# Patient Record
Sex: MALE | Race: BLACK OR AFRICAN AMERICAN | Employment: OTHER | URBAN - NONMETROPOLITAN AREA
[De-identification: names, ages, dates, MRNs, and addresses within clinical notes are randomized per-mention and may not be internally consistent; named-entity substitution may affect disease eponyms.]

---

## 2017-01-01 ENCOUNTER — TELEPHONE (OUTPATIENT)
Dept: UROLOGY | Age: 67
End: 2017-01-01

## 2017-01-01 ENCOUNTER — NURSE ONLY (OUTPATIENT)
Dept: UROLOGY | Age: 67
End: 2017-01-01
Payer: MEDICARE

## 2017-01-01 DIAGNOSIS — R53.83 FATIGUE, UNSPECIFIED TYPE: ICD-10-CM

## 2017-01-01 DIAGNOSIS — C79.52 SECONDARY MALIGNANT NEOPLASM OF BONE AND BONE MARROW (HCC): ICD-10-CM

## 2017-01-01 DIAGNOSIS — C61 PROSTATE CANCER (HCC): Primary | ICD-10-CM

## 2017-01-01 DIAGNOSIS — M85.80 OSTEOPENIA DUE TO CANCER THERAPY: ICD-10-CM

## 2017-01-01 DIAGNOSIS — C79.51 SECONDARY MALIGNANT NEOPLASM OF BONE AND BONE MARROW (HCC): ICD-10-CM

## 2017-01-01 PROCEDURE — 96402 CHEMO HORMON ANTINEOPL SQ/IM: CPT | Performed by: NURSE PRACTITIONER

## 2017-01-01 PROCEDURE — 99999 PR OFFICE/OUTPT VISIT,PROCEDURE ONLY: CPT | Performed by: NURSE PRACTITIONER

## 2017-01-01 PROCEDURE — 96401 CHEMO ANTI-NEOPL SQ/IM: CPT | Performed by: NURSE PRACTITIONER

## 2017-10-26 DIAGNOSIS — N18.2 CKD (CHRONIC KIDNEY DISEASE), STAGE II: Primary | ICD-10-CM

## 2017-11-10 ENCOUNTER — APPOINTMENT (OUTPATIENT)
Dept: CT IMAGING | Age: 67
End: 2017-11-10
Payer: MEDICARE

## 2017-11-10 ENCOUNTER — HOSPITAL ENCOUNTER (EMERGENCY)
Age: 67
Discharge: LEFT BEFORE DISCHARGE | End: 2017-11-10
Attending: EMERGENCY MEDICINE
Payer: MEDICARE

## 2017-11-10 ENCOUNTER — APPOINTMENT (OUTPATIENT)
Dept: GENERAL RADIOLOGY | Age: 67
End: 2017-11-10
Payer: MEDICARE

## 2017-11-10 VITALS
SYSTOLIC BLOOD PRESSURE: 95 MMHG | DIASTOLIC BLOOD PRESSURE: 67 MMHG | RESPIRATION RATE: 18 BRPM | BODY MASS INDEX: 23.03 KG/M2 | OXYGEN SATURATION: 100 % | TEMPERATURE: 98.4 F | WEIGHT: 170 LBS | HEIGHT: 72 IN

## 2017-11-10 DIAGNOSIS — K59.00 CONSTIPATION, UNSPECIFIED CONSTIPATION TYPE: ICD-10-CM

## 2017-11-10 DIAGNOSIS — C80.1 CANCER (HCC): ICD-10-CM

## 2017-11-10 DIAGNOSIS — E87.6 HYPOKALEMIA: Primary | ICD-10-CM

## 2017-11-10 LAB
ALBUMIN SERPL-MCNC: 3.6 G/DL (ref 3.5–5.1)
ALP BLD-CCNC: 993 U/L (ref 38–126)
ALT SERPL-CCNC: < 5 U/L (ref 11–66)
ANION GAP SERPL CALCULATED.3IONS-SCNC: 13 MEQ/L (ref 8–16)
AST SERPL-CCNC: 17 U/L (ref 5–40)
BILIRUB SERPL-MCNC: 0.4 MG/DL (ref 0.3–1.2)
BUN BLDV-MCNC: 16 MG/DL (ref 7–22)
CALCIUM SERPL-MCNC: 8.6 MG/DL (ref 8.5–10.5)
CHLORIDE BLD-SCNC: 93 MEQ/L (ref 98–111)
CO2: 29 MEQ/L (ref 23–33)
CREAT SERPL-MCNC: 0.6 MG/DL (ref 0.4–1.2)
DIFFERENTIAL, MANUAL: NORMAL
EKG ATRIAL RATE: 66 BPM
EKG P AXIS: 41 DEGREES
EKG P-R INTERVAL: 176 MS
EKG Q-T INTERVAL: 444 MS
EKG QRS DURATION: 86 MS
EKG QTC CALCULATION (BAZETT): 465 MS
EKG R AXIS: 57 DEGREES
EKG T AXIS: 61 DEGREES
EKG VENTRICULAR RATE: 66 BPM
GFR SERPL CREATININE-BSD FRML MDRD: > 90 ML/MIN/1.73M2
GLUCOSE BLD-MCNC: 144 MG/DL (ref 70–108)
MAGNESIUM: 2.3 MG/DL (ref 1.6–2.4)
OSMOLALITY CALCULATION: 273.8 MOSMOL/KG (ref 275–300)
POTASSIUM SERPL-SCNC: 2.9 MEQ/L (ref 3.5–5.2)
SODIUM BLD-SCNC: 135 MEQ/L (ref 135–145)
TOTAL PROTEIN: 6.9 G/DL (ref 6.1–8)

## 2017-11-10 PROCEDURE — 93005 ELECTROCARDIOGRAM TRACING: CPT

## 2017-11-10 PROCEDURE — 99284 EMERGENCY DEPT VISIT MOD MDM: CPT

## 2017-11-10 PROCEDURE — 74176 CT ABD & PELVIS W/O CONTRAST: CPT

## 2017-11-10 PROCEDURE — 83735 ASSAY OF MAGNESIUM: CPT

## 2017-11-10 PROCEDURE — 71010 XR CHEST PORTABLE: CPT

## 2017-11-10 PROCEDURE — 85025 COMPLETE CBC W/AUTO DIFF WBC: CPT

## 2017-11-10 PROCEDURE — 6370000000 HC RX 637 (ALT 250 FOR IP): Performed by: EMERGENCY MEDICINE

## 2017-11-10 PROCEDURE — 36415 COLL VENOUS BLD VENIPUNCTURE: CPT

## 2017-11-10 PROCEDURE — 80053 COMPREHEN METABOLIC PANEL: CPT

## 2017-11-10 RX ORDER — POTASSIUM CHLORIDE 20 MEQ/1
20 TABLET, EXTENDED RELEASE ORAL 2 TIMES DAILY WITH MEALS
Status: DISCONTINUED | OUTPATIENT
Start: 2017-11-10 | End: 2017-11-10 | Stop reason: HOSPADM

## 2017-11-10 RX ORDER — POTASSIUM CHLORIDE 7.45 MG/ML
10 INJECTION INTRAVENOUS ONCE
Status: DISCONTINUED | OUTPATIENT
Start: 2017-11-10 | End: 2017-11-10

## 2017-11-10 RX ORDER — 0.9 % SODIUM CHLORIDE 0.9 %
1000 INTRAVENOUS SOLUTION INTRAVENOUS ONCE
Status: DISCONTINUED | OUTPATIENT
Start: 2017-11-10 | End: 2017-11-10

## 2017-11-10 RX ADMIN — CITROMA MAGNESIUM CITRATE 296 ML: 1.75 LIQUID ORAL at 19:43

## 2017-11-10 RX ADMIN — POTASSIUM CHLORIDE 20 MEQ: 20 TABLET, EXTENDED RELEASE ORAL at 18:57

## 2017-11-10 NOTE — ED NOTES
Patient presents to ED with complaints of constipation and weight loss.       Eloy Al LPN  54/03/87 [id number]

## 2017-11-11 ENCOUNTER — HOSPITAL ENCOUNTER (INPATIENT)
Age: 67
LOS: 5 days | Discharge: HOME | DRG: 722 | End: 2017-11-16
Attending: INTERNAL MEDICINE | Admitting: INTERNAL MEDICINE
Payer: MEDICARE

## 2017-11-11 DIAGNOSIS — C61 CA PROSTATE, ADENOCA (HCC): ICD-10-CM

## 2017-11-11 DIAGNOSIS — E87.6 HYPOKALEMIA: ICD-10-CM

## 2017-11-11 DIAGNOSIS — R97.20 ELEVATED PSA: ICD-10-CM

## 2017-11-11 DIAGNOSIS — D64.9 ANEMIA, UNSPECIFIED TYPE: Primary | ICD-10-CM

## 2017-11-11 DIAGNOSIS — R74.8 ELEVATED ALKALINE PHOSPHATASE LEVEL: ICD-10-CM

## 2017-11-11 PROBLEM — N32.9 LESION OF BLADDER: Status: ACTIVE | Noted: 2017-11-11

## 2017-11-11 PROBLEM — R59.9 ADENOPATHY: Status: ACTIVE | Noted: 2017-11-11

## 2017-11-11 PROBLEM — R63.4 WEIGHT LOSS: Status: ACTIVE | Noted: 2017-11-11

## 2017-11-11 LAB
ABO: NORMAL
ALBUMIN SERPL-MCNC: 3.7 G/DL (ref 3.5–5.1)
ALP BLD-CCNC: 1032 U/L (ref 38–126)
ALT SERPL-CCNC: < 5 U/L (ref 11–66)
ANION GAP SERPL CALCULATED.3IONS-SCNC: 15 MEQ/L (ref 8–16)
ANISOCYTOSIS: ABNORMAL
ANISOCYTOSIS: ABNORMAL
ANTIBODY SCREEN: NORMAL
APTT: 23.7 SECONDS (ref 22–38)
AST SERPL-CCNC: 22 U/L (ref 5–40)
BANDED NEUTROPHILS ABSOLUTE COUNT: 0.3 THOU/MM3
BANDED NEUTROPHILS ABSOLUTE COUNT: 0.3 THOU/MM3
BANDS PRESENT: 5 %
BANDS PRESENT: 7 %
BASOPHILIA: SLIGHT
BASOPHILIA: SLIGHT
BASOPHILS # BLD: 0 %
BASOPHILS # BLD: 0 %
BASOPHILS ABSOLUTE: 0 THOU/MM3 (ref 0–0.1)
BASOPHILS ABSOLUTE: 0 THOU/MM3 (ref 0–0.1)
BILIRUB SERPL-MCNC: 0.4 MG/DL (ref 0.3–1.2)
BILIRUBIN DIRECT: < 0.2 MG/DL (ref 0–0.3)
BUN BLDV-MCNC: 18 MG/DL (ref 7–22)
CALCIUM SERPL-MCNC: 8.6 MG/DL (ref 8.5–10.5)
CHLORIDE BLD-SCNC: 91 MEQ/L (ref 98–111)
CO2: 29 MEQ/L (ref 23–33)
CREAT SERPL-MCNC: 0.7 MG/DL (ref 0.4–1.2)
CRENATED RBC'S: ABNORMAL
DIFFERENTIAL, MANUAL: NORMAL
EOSINOPHIL # BLD: 0 %
EOSINOPHIL # BLD: 0 %
EOSINOPHILS ABSOLUTE: 0 THOU/MM3 (ref 0–0.4)
EOSINOPHILS ABSOLUTE: 0 THOU/MM3 (ref 0–0.4)
ETHYL ALCOHOL, SERUM: < 0.01 %
FERRITIN: 2895 NG/ML (ref 22–322)
GFR SERPL CREATININE-BSD FRML MDRD: > 90 ML/MIN/1.73M2
GLUCOSE BLD-MCNC: 116 MG/DL (ref 70–108)
HCT VFR BLD CALC: 20.4 % (ref 42–52)
HCT VFR BLD CALC: 21.8 % (ref 42–52)
HEMOGLOBIN: 6.5 GM/DL (ref 14–18)
HEMOGLOBIN: 6.8 GM/DL (ref 14–18)
HYPOCHROMIA: ABNORMAL
HYPOCHROMIA: ABNORMAL
INR BLD: 1.22 (ref 0.85–1.13)
IRON: 99 UG/DL (ref 65–195)
LD: 335 U/L (ref 100–190)
LYMPHOCYTES # BLD: 28 %
LYMPHOCYTES # BLD: 29 %
LYMPHOCYTES ABSOLUTE: 1.2 THOU/MM3 (ref 1–4.8)
LYMPHOCYTES ABSOLUTE: 1.5 THOU/MM3 (ref 1–4.8)
MCH RBC QN AUTO: 26.9 PG (ref 27–31)
MCH RBC QN AUTO: 27.5 PG (ref 27–31)
MCHC RBC AUTO-ENTMCNC: 31.2 GM/DL (ref 33–37)
MCHC RBC AUTO-ENTMCNC: 31.7 GM/DL (ref 33–37)
MCV RBC AUTO: 86.2 FL (ref 80–94)
MCV RBC AUTO: 86.5 FL (ref 80–94)
METAMYELOCYTES: 2 %
METAMYELOCYTES: 2 %
MONOCYTES # BLD: 2 %
MONOCYTES # BLD: 3 %
MONOCYTES ABSOLUTE: 0.1 THOU/MM3 (ref 0.4–1.3)
MONOCYTES ABSOLUTE: 0.1 THOU/MM3 (ref 0.4–1.3)
MYELOCYTES: 1 %
NUCLEATED RED BLOOD CELLS: 10 /100 WBC
NUCLEATED RED BLOOD CELLS: 10 /100 WBC
OSMOLALITY CALCULATION: 273 MOSMOL/KG (ref 275–300)
OVALOCYTES: ABNORMAL
PATHOLOGIST REVIEW: ABNORMAL
PDW BLD-RTO: 21.6 % (ref 11.5–14.5)
PDW BLD-RTO: 22.5 % (ref 11.5–14.5)
PLATELET # BLD: 580 THOU/MM3 (ref 130–400)
PLATELET # BLD: 655 THOU/MM3 (ref 130–400)
PLATELET ESTIMATE: ABNORMAL
PLATELET ESTIMATE: ABNORMAL
PMV BLD AUTO: 6.1 MCM (ref 7.4–10.4)
PMV BLD AUTO: 6.4 MCM (ref 7.4–10.4)
POTASSIUM SERPL-SCNC: 3.2 MEQ/L (ref 3.5–5.2)
RBC # BLD: 2.36 MILL/MM3 (ref 4.7–6.1)
RBC # BLD: 2.53 MILL/MM3 (ref 4.7–6.1)
RETICULOCYTE ABSOLUTE COUNT: 2 % (ref 0.5–2)
RH FACTOR: NORMAL
SCHISTOCYTES: ABNORMAL
SEG NEUTROPHILS: 59 %
SEG NEUTROPHILS: 62 %
SEGMENTED NEUTROPHILS ABSOLUTE COUNT: 2.4 THOU/MM3 (ref 1.8–7.7)
SEGMENTED NEUTROPHILS ABSOLUTE COUNT: 3.4 THOU/MM3 (ref 1.8–7.7)
SODIUM BLD-SCNC: 135 MEQ/L (ref 135–145)
SPHEROCYTES: ABNORMAL
TARGET CELLS: ABNORMAL
TARGET CELLS: ABNORMAL
TOTAL IRON BINDING CAPACITY: 179 UG/DL (ref 171–450)
TOTAL PROTEIN: 7.2 G/DL (ref 6.1–8)
TSH SERPL DL<=0.05 MIU/L-ACNC: 36.78 UIU/ML (ref 0.4–4.2)
WBC # BLD: 4 THOU/MM3 (ref 4.8–10.8)
WBC # BLD: 5.5 THOU/MM3 (ref 4.8–10.8)

## 2017-11-11 PROCEDURE — A6210 FOAM DRG >16<=48 SQ IN W/O B: HCPCS

## 2017-11-11 PROCEDURE — 86923 COMPATIBILITY TEST ELECTRIC: CPT

## 2017-11-11 PROCEDURE — 6370000000 HC RX 637 (ALT 250 FOR IP): Performed by: NURSE PRACTITIONER

## 2017-11-11 PROCEDURE — 99284 EMERGENCY DEPT VISIT MOD MDM: CPT

## 2017-11-11 PROCEDURE — 2580000003 HC RX 258: Performed by: NURSE PRACTITIONER

## 2017-11-11 PROCEDURE — 86301 IMMUNOASSAY TUMOR CA 19-9: CPT

## 2017-11-11 PROCEDURE — G0480 DRUG TEST DEF 1-7 CLASSES: HCPCS

## 2017-11-11 PROCEDURE — 86901 BLOOD TYPING SEROLOGIC RH(D): CPT

## 2017-11-11 PROCEDURE — 83550 IRON BINDING TEST: CPT

## 2017-11-11 PROCEDURE — 86304 IMMUNOASSAY TUMOR CA 125: CPT

## 2017-11-11 PROCEDURE — 84443 ASSAY THYROID STIM HORMONE: CPT

## 2017-11-11 PROCEDURE — 99222 1ST HOSP IP/OBS MODERATE 55: CPT | Performed by: INTERNAL MEDICINE

## 2017-11-11 PROCEDURE — 82248 BILIRUBIN DIRECT: CPT

## 2017-11-11 PROCEDURE — 83540 ASSAY OF IRON: CPT

## 2017-11-11 PROCEDURE — 82728 ASSAY OF FERRITIN: CPT

## 2017-11-11 PROCEDURE — P9016 RBC LEUKOCYTES REDUCED: HCPCS

## 2017-11-11 PROCEDURE — 2580000003 HC RX 258: Performed by: INTERNAL MEDICINE

## 2017-11-11 PROCEDURE — 83615 LACTATE (LD) (LDH) ENZYME: CPT

## 2017-11-11 PROCEDURE — 85730 THROMBOPLASTIN TIME PARTIAL: CPT

## 2017-11-11 PROCEDURE — 85610 PROTHROMBIN TIME: CPT

## 2017-11-11 PROCEDURE — 85045 AUTOMATED RETICULOCYTE COUNT: CPT

## 2017-11-11 PROCEDURE — 86900 BLOOD TYPING SEROLOGIC ABO: CPT

## 2017-11-11 PROCEDURE — 86850 RBC ANTIBODY SCREEN: CPT

## 2017-11-11 PROCEDURE — 85025 COMPLETE CBC W/AUTO DIFF WBC: CPT

## 2017-11-11 PROCEDURE — 36415 COLL VENOUS BLD VENIPUNCTURE: CPT

## 2017-11-11 PROCEDURE — 80053 COMPREHEN METABOLIC PANEL: CPT

## 2017-11-11 PROCEDURE — 1200000000 HC SEMI PRIVATE

## 2017-11-11 PROCEDURE — 36430 TRANSFUSION BLD/BLD COMPNT: CPT

## 2017-11-11 RX ORDER — PRAVASTATIN SODIUM 40 MG
40 TABLET ORAL NIGHTLY
Status: DISCONTINUED | OUTPATIENT
Start: 2017-11-11 | End: 2017-11-16 | Stop reason: HOSPADM

## 2017-11-11 RX ORDER — POTASSIUM CHLORIDE 20 MEQ/1
40 TABLET, EXTENDED RELEASE ORAL ONCE
Status: COMPLETED | OUTPATIENT
Start: 2017-11-11 | End: 2017-11-11

## 2017-11-11 RX ORDER — SODIUM CHLORIDE 9 MG/ML
INJECTION, SOLUTION INTRAVENOUS CONTINUOUS
Status: ACTIVE | OUTPATIENT
Start: 2017-11-11 | End: 2017-11-12

## 2017-11-11 RX ORDER — ASPIRIN 325 MG
325 TABLET ORAL
Status: DISCONTINUED | OUTPATIENT
Start: 2017-11-12 | End: 2017-11-16 | Stop reason: HOSPADM

## 2017-11-11 RX ORDER — ONDANSETRON 2 MG/ML
4 INJECTION INTRAMUSCULAR; INTRAVENOUS EVERY 6 HOURS PRN
Status: DISCONTINUED | OUTPATIENT
Start: 2017-11-11 | End: 2017-11-16 | Stop reason: HOSPADM

## 2017-11-11 RX ORDER — POTASSIUM CHLORIDE 20MEQ/15ML
40 LIQUID (ML) ORAL PRN
Status: DISCONTINUED | OUTPATIENT
Start: 2017-11-11 | End: 2017-11-16 | Stop reason: HOSPADM

## 2017-11-11 RX ORDER — POTASSIUM CHLORIDE 20 MEQ/1
40 TABLET, EXTENDED RELEASE ORAL PRN
Status: DISCONTINUED | OUTPATIENT
Start: 2017-11-11 | End: 2017-11-16 | Stop reason: HOSPADM

## 2017-11-11 RX ORDER — SODIUM CHLORIDE 0.9 % (FLUSH) 0.9 %
10 SYRINGE (ML) INJECTION PRN
Status: DISCONTINUED | OUTPATIENT
Start: 2017-11-11 | End: 2017-11-16 | Stop reason: HOSPADM

## 2017-11-11 RX ORDER — ATENOLOL 25 MG/1
25 TABLET ORAL DAILY
Status: DISCONTINUED | OUTPATIENT
Start: 2017-11-12 | End: 2017-11-15

## 2017-11-11 RX ORDER — 0.9 % SODIUM CHLORIDE 0.9 %
250 INTRAVENOUS SOLUTION INTRAVENOUS ONCE
Status: COMPLETED | OUTPATIENT
Start: 2017-11-11 | End: 2017-11-12

## 2017-11-11 RX ORDER — SODIUM CHLORIDE 0.9 % (FLUSH) 0.9 %
10 SYRINGE (ML) INJECTION EVERY 12 HOURS SCHEDULED
Status: DISCONTINUED | OUTPATIENT
Start: 2017-11-11 | End: 2017-11-16 | Stop reason: HOSPADM

## 2017-11-11 RX ORDER — ACETAMINOPHEN 325 MG/1
650 TABLET ORAL EVERY 4 HOURS PRN
Status: DISCONTINUED | OUTPATIENT
Start: 2017-11-11 | End: 2017-11-16 | Stop reason: HOSPADM

## 2017-11-11 RX ORDER — M-VIT,TX,IRON,MINS/CALC/FOLIC 27MG-0.4MG
1 TABLET ORAL DAILY
Status: DISCONTINUED | OUTPATIENT
Start: 2017-11-11 | End: 2017-11-16 | Stop reason: HOSPADM

## 2017-11-11 RX ORDER — POTASSIUM CHLORIDE 7.45 MG/ML
10 INJECTION INTRAVENOUS PRN
Status: DISCONTINUED | OUTPATIENT
Start: 2017-11-11 | End: 2017-11-16 | Stop reason: HOSPADM

## 2017-11-11 RX ORDER — LEVOTHYROXINE SODIUM 0.15 MG/1
150 TABLET ORAL
Status: DISCONTINUED | OUTPATIENT
Start: 2017-11-12 | End: 2017-11-16 | Stop reason: HOSPADM

## 2017-11-11 RX ADMIN — SODIUM CHLORIDE: 9 INJECTION, SOLUTION INTRAVENOUS at 21:09

## 2017-11-11 RX ADMIN — SODIUM CHLORIDE 250 ML: 9 INJECTION, SOLUTION INTRAVENOUS at 17:51

## 2017-11-11 RX ADMIN — Medication 10 ML: at 21:14

## 2017-11-11 RX ADMIN — POTASSIUM CHLORIDE 40 MEQ: 20 TABLET, EXTENDED RELEASE ORAL at 13:34

## 2017-11-11 ASSESSMENT — ENCOUNTER SYMPTOMS
WHEEZING: 0
COUGH: 0
NAUSEA: 0
RHINORRHEA: 0
SHORTNESS OF BREATH: 0
ABDOMINAL PAIN: 0
BACK PAIN: 0
DIARRHEA: 0
EYE DISCHARGE: 0
EYE REDNESS: 0
SORE THROAT: 0
VOMITING: 0

## 2017-11-11 NOTE — ED PROVIDER NOTES
[facility] Emergency Department    CHIEF COMPLAINT       Chief Complaint   Patient presents with    Weight Loss       Nurses Notes reviewed and I agree except as noted in the HPI. HISTORY OF PRESENT ILLNESS    [place] ROSHAN Mata. is a 78 y.o. male who presents to the ED for evaluation of weight loss and decreased appetite. The patient presented in the ED yesterday complaining of similar symptoms and was told to come back to the ED today for further evaluation and work up. The patient reports to have no recent bowel movements and reports dysuria upon urination. The patient reports no recent doctor appointments or visits within the past 2 months. He states to go the [place] for medical care. He reports a medical history of thyroid problems, hypertension, and high cholesterol. He reports to be on medication to balance these conditions. No additional symptoms or complaints at this time. Experienced previously similar symptoms yesterday. HPI was provided by the patient. REVIEW OF SYSTEMS     Review of Systems   Constitutional: Positive for appetite change (decreased appetite). Negative for chills, fatigue and fever. Recent weight loss     HENT: Negative for congestion, ear pain, rhinorrhea and sore throat. Eyes: Negative for discharge, redness and visual disturbance. Respiratory: Negative for cough, shortness of breath and wheezing. Cardiovascular: Negative for chest pain, palpitations and leg swelling. Gastrointestinal: Negative for abdominal pain, diarrhea, nausea and vomiting. Genitourinary: Positive for dysuria. Negative for decreased urine volume and difficulty urinating. Musculoskeletal: Negative for arthralgias, back pain, joint swelling and neck pain. Skin: Negative for pallor and rash. Allergic/Immunologic: Negative for environmental allergies. Neurological: Negative for dizziness, syncope, weakness, light-headedness and headaches. Hematological: Negative for adenopathy. Psychiatric/Behavioral: Negative for agitation, confusion, dysphoric mood and suicidal ideas. The patient is not nervous/anxious. PAST MEDICAL HISTORY    has a past medical history of Backache; Cancer ([address] Goiter; Hyperlipidemia; Hypertension; Incontinence of urine; and Osteoarthritis. SURGICAL HISTORY      has a past surgical history that includes ostate surgery (); Thyroid surgery (); and Colonoscopy. CURRENT MEDICATIONS       Current Discharge Medication List      CONTINUE these medications which have NOT CHANGED    Details   Multiple Vitamins-Minerals (MENS MULTI VITAMIN & MINERAL PO) Take by mouth      atenolol (TENORMIN) 25 MG tablet Take 25 mg by mouth daily. levothyroxine (SYNTHROID) 100 MCG tablet Take 150 mcg by mouth Daily       aspirin 325 MG tablet Take 325 mg by mouth every 48 hours       pravastatin (PRAVACHOL) 40 MG tablet Take 40 mg by mouth daily. ALLERGIES     has No Known Allergies. FAMILY HISTORY     indicated that his mother is . He indicated that his father is . He indicated that his sister is alive. He indicated that his brother is alive. family history includes Cancer in his father; Diabetes in his mother; High Blood Pressure in his mother. SOCIAL HISTORY      reports that he quit smoking about 36 years ago. His smoking use included Cigarettes, Pipe, and Cigars. He has never used smokeless tobacco. He reports that he drinks alcohol. He reports that he does not use drugs. PHYSICAL EXAM     INITIAL VITALS:  height is 6' 1.5\" (1.867 m) and weight is 139 lb 3.2 oz (63.1 kg). His oral temperature is 98.4 °F (36.9 °C). His blood pressure is 130/68 and his pulse is 74. His respiration is 16 and oxygen saturation is 100%. Physical Exam   Constitutional: He is oriented to person, place, and time. He appears well-developed and well-nourished. HENT:   Head: Normocephalic and atraumatic.    Right Ear: External ear normal.   Left Ear: External ear normal.   Eyes: Conjunctivae are normal. Right eye exhibits no discharge. Left eye exhibits no discharge. No scleral icterus. Neck: Normal range of motion. Neck supple. No JVD present. Cardiovascular: Normal rate, regular rhythm and normal heart sounds. Exam reveals no gallop and no friction rub. No murmur heard. Pulmonary/Chest: Effort normal and breath sounds normal. No respiratory distress. He has no decreased breath sounds. He has no wheezes. He has no rhonchi. He has no rales. Abdominal: Soft. He exhibits no distension. There is no tenderness. There is no rebound and no guarding. Musculoskeletal: Normal range of motion. He exhibits no edema. Neurological: He is alert and oriented to person, place, and time. He exhibits normal muscle tone. He displays no seizure activity. GCS eye subscore is 4. GCS verbal subscore is 5. GCS motor subscore is 6. Skin: Skin is warm and dry. No rash noted. He is not diaphoretic. Psychiatric: He has a normal mood and affect. His behavior is normal. Thought content normal.   Nursing note and vitals reviewed. DIFFERENTIAL DIAGNOSIS:   Anemia, electrolyte imbalance, cancer    DIAGNOSTIC RESULTS     EKG: All EKG's are interpreted by the Emergency Department Physician who either signs or Co-signs this chart in the absence of a cardiologist.    None    RADIOLOGY: non-plain film images(s) such as CT, Ultrasound and MRI are read by the radiologist.  Plain radiographic images are visualized and preliminarily interpreted by the emergency physician unless otherwise stated below.   No orders to display         LABS:   Labs Reviewed   CBC WITH AUTO DIFFERENTIAL - Abnormal; Notable for the following:        Result Value    RBC 2.36 (*)     Hemoglobin 6.5 (*)     Hematocrit 20.4 (*)     MCHC 31.7 (*)     RDW 22.5 (*)     Platelets 198 (*)     MPV 6.4 (*)     Monocytes # 0.1 (*)     All other components within normal limits   PROTIME-INR - Abnormal; Notable for the following:     INR 1.22 (*)     All other components within normal limits   BASIC METABOLIC PANEL - Abnormal; Notable for the following:     Potassium 3.2 (*)     Chloride 91 (*)     Glucose 116 (*)     All other components within normal limits   HEPATIC FUNCTION PANEL - Abnormal; Notable for the following:     Alkaline Phosphatase 1,032 (*)     ALT <5 (*)     All other components within normal limits   TSH WITHOUT REFLEX - Abnormal; Notable for the following:     TSH 36.780 (*)     All other components within normal limits   LACTATE DEHYDROGENASE - Abnormal; Notable for the following:      (*)     All other components within normal limits   OSMOLALITY - Abnormal; Notable for the following:     Osmolality Calc 273.0 (*)     All other components within normal limits   RETICULOCYTES   APTT   ETHANOL   ANION GAP   GLOMERULAR FILTRATION RATE, ESTIMATED   MANUAL DIFFERENTIAL   URINE DRUG SCREEN   URINE RT REFLEX TO CULTURE      CANCER ANTIGEN 19-9   FERRITIN   IRON   IRON BINDING CAPACITY   TYPE AND SCREEN   PREPARE RBC (CROSSMATCH)    Narrative:     [id number]     selected  [id number]     selected       EMERGENCY DEPARTMENT COURSE:   Vitals:    Vitals:    11/11/17 1236 11/11/17 1545   BP: 109/78 130/68   Pulse: 90 74   Resp: 18 16   Temp: 97.5 °F (36.4 °C) 98.4 °F (36.9 °C)   TempSrc: Oral Oral   SpO2: 97% 100%   Weight: 170 lb (77.1 kg) 139 lb 3.2 oz (63.1 kg)   Height:  6' 1.5\" (1.867 m)         MDM    Patient was seen and evaluated in the emergency department, patient appeared to be no acute distress, patient was seen here previous day, decision was made to admit the patient fortunately he had personal matters that he had to attend to. He returns today with personal matters taking care of. He has weakness, dizziness, and continued fatigue. Yesterday he was noted to be anemic, as well as hypokalemia.   His CT scan of the abdomen which was concerning for retroperitoneal lymphadenopathy that Jennifer    PROCEDURES:  None    FINAL IMPRESSION      1. Anemia, unspecified type    2. Hypokalemia    3. Elevated alkaline phosphatase level          DISPOSITION/PLAN   Patient admitted to the hospital service  PATIENT REFERRED TO:  Fatoumata Smith, [address] [facility] [address] [facility]            DISCHARGE MEDICATIONS:  Current Discharge Medication List          (Please note that portions of this note were completed with a voice recognition program.  Efforts were made to edit the dictations but occasionally words are mis-transcribed.)    Scribe:  Dominick Lopes 11/11/17 1:18 PM Scribing for and in the presence of Billy Moreno CNP. Signed by: Kathy Shepard, 11/11/17 4:18 PM    Provider:  I personally performed the services described in the documentation, reviewed and edited the documentation which was dictated to the scribe in my presence, and it accurately records my words and actions.     Billy Moreno CNP 11/11/17 4:18 PM            Billy Moreno NP  11/11/17 1818

## 2017-11-11 NOTE — ED PROVIDER NOTES
[facility] EMERGENCY DEPT      CHIEF COMPLAINT       Chief Complaint   Patient presents with    Constipation    Weight Loss    Dizziness       Nurses Notes reviewed and I agree except as noted in the HPI. HISTORY OF PRESENT ILLNESS    [place] W Lenny Delacruz. is a 78 y.o. male who presents Complaint of constipation and weight loss of a 2 month period. Patient has no known history of cancer. States that he's had decreased oral intake due to poor appetite. He denies having had any coughs, fever chills, easy bruising. He has not had any recent history of colonoscopy. Onset: subacute  Duration: 2 months  Timing: constant  Location of Pain: no pain  Intesity/severity: moderate of weight loss  Modifying Factors: poor appetite  Relieved by;  Previous Episodes; Tx Before arrival: none  REVIEW OF SYSTEMS      Review of Systems   Constitutional: Negative for fever, chills, diaphoresis and fatigue. HENT: Negative for congestion, drooling, facial swelling and sore throat. Eyes: Negative for photophobia, pain and discharge. Respiratory: Negative for cough, shortness of breath, wheezing and stridor. Cardiovascular: Negative for chest pain, palpitations and leg swelling. Gastrointestinal: Negative for abdominal pain, blood in stool and abdominal distention. Endocrine: Negative for cold intolerance, heat intolerance, polydipsia and polyuria. Genitourinary: Negative for dysuria, urgency, hematuria and difficulty urinating. Musculoskeletal: Negative for gait problem, neck pain and neck stiffness. Skin; No rash, No itching  Neurological: Negative for seizures, weakness and numbness. Hematological: Negative for adenopathy. Does not bruise/bleed easily. Psychiatric/Behavioral: Negative for hallucinations, confusion and agitation. PAST MEDICAL HISTORY    has a past medical history of Backache; Cancer ([address] Goiter; Hyperlipidemia; Hypertension; Incontinence of urine; and Osteoarthritis.     SURGICAL

## 2017-11-11 NOTE — H&P
for the opportunity to be involved in this patient's care.     Electronically signed by Evette Perez DO on 11/11/2017 at 5:34 PM

## 2017-11-12 LAB
AMPHETAMINE+METHAMPHETAMINE URINE SCREEN: NEGATIVE
ANION GAP SERPL CALCULATED.3IONS-SCNC: 13 MEQ/L (ref 8–16)
BACTERIA: ABNORMAL /HPF
BARBITURATE QUANTITATIVE URINE: NEGATIVE
BENZODIAZEPINE QUANTITATIVE URINE: NEGATIVE
BILIRUBIN URINE: NEGATIVE
BLOOD, URINE: NEGATIVE
BUN BLDV-MCNC: 16 MG/DL (ref 7–22)
CA 125: 17 U/ML
CA 19-9: 24 U/ML (ref 0–35)
CALCIUM SERPL-MCNC: 8.1 MG/DL (ref 8.5–10.5)
CANNABINOID QUANTITATIVE URINE: NEGATIVE
CASTS 2: ABNORMAL /LPF
CASTS UA: ABNORMAL /LPF
CHARACTER, URINE: ABNORMAL
CHLORIDE BLD-SCNC: 99 MEQ/L (ref 98–111)
CO2: 27 MEQ/L (ref 23–33)
COCAINE METABOLITE QUANTITATIVE URINE: NEGATIVE
COLOR: YELLOW
CREAT SERPL-MCNC: 0.6 MG/DL (ref 0.4–1.2)
CRYSTALS, UA: ABNORMAL
EPITHELIAL CELLS, UA: ABNORMAL /HPF
GFR SERPL CREATININE-BSD FRML MDRD: > 90 ML/MIN/1.73M2
GLUCOSE BLD-MCNC: 100 MG/DL (ref 70–108)
GLUCOSE URINE: NEGATIVE MG/DL
HCT VFR BLD CALC: 22.3 % (ref 42–52)
HEMOGLOBIN: 7.2 GM/DL (ref 14–18)
KETONES, URINE: NEGATIVE
LEUKOCYTE ESTERASE, URINE: ABNORMAL
MCH RBC QN AUTO: 28.3 PG (ref 27–31)
MCHC RBC AUTO-ENTMCNC: 32.5 GM/DL (ref 33–37)
MCV RBC AUTO: 87.1 FL (ref 80–94)
MISCELLANEOUS 2: ABNORMAL
NITRITE, URINE: POSITIVE
OPIATES, URINE: NEGATIVE
OXYCODONE: NEGATIVE
PDW BLD-RTO: 20.8 % (ref 11.5–14.5)
PH UA: 7.5
PHENCYCLIDINE QUANTITATIVE URINE: NEGATIVE
PLATELET # BLD: 460 THOU/MM3 (ref 130–400)
PMV BLD AUTO: 6.2 MCM (ref 7.4–10.4)
POTASSIUM SERPL-SCNC: 3.4 MEQ/L (ref 3.5–5.2)
PROSTATE SPECIFIC ANTIGEN: 646.7 NG/ML (ref 0–1)
PROTEIN UA: ABNORMAL
RBC # BLD: 2.56 MILL/MM3 (ref 4.7–6.1)
RBC URINE: ABNORMAL /HPF
RENAL EPITHELIAL, UA: ABNORMAL
SODIUM BLD-SCNC: 139 MEQ/L (ref 135–145)
SPECIFIC GRAVITY, URINE: 1.02 (ref 1–1.03)
UROBILINOGEN, URINE: 1 EU/DL
WBC # BLD: 4.6 THOU/MM3 (ref 4.8–10.8)
WBC UA: ABNORMAL /HPF
YEAST: ABNORMAL

## 2017-11-12 PROCEDURE — 1200000000 HC SEMI PRIVATE

## 2017-11-12 PROCEDURE — 99232 SBSQ HOSP IP/OBS MODERATE 35: CPT | Performed by: INTERNAL MEDICINE

## 2017-11-12 PROCEDURE — 36415 COLL VENOUS BLD VENIPUNCTURE: CPT

## 2017-11-12 PROCEDURE — 80307 DRUG TEST PRSMV CHEM ANLYZR: CPT

## 2017-11-12 PROCEDURE — 99999 PR OFFICE/OUTPT VISIT,PROCEDURE ONLY: CPT | Performed by: NURSE PRACTITIONER

## 2017-11-12 PROCEDURE — 99232 SBSQ HOSP IP/OBS MODERATE 35: CPT | Performed by: UROLOGY

## 2017-11-12 PROCEDURE — 2580000003 HC RX 258: Performed by: INTERNAL MEDICINE

## 2017-11-12 PROCEDURE — 87077 CULTURE AEROBIC IDENTIFY: CPT

## 2017-11-12 PROCEDURE — 81001 URINALYSIS AUTO W/SCOPE: CPT

## 2017-11-12 PROCEDURE — 6370000000 HC RX 637 (ALT 250 FOR IP): Performed by: NURSE PRACTITIONER

## 2017-11-12 PROCEDURE — 87086 URINE CULTURE/COLONY COUNT: CPT

## 2017-11-12 PROCEDURE — 84153 ASSAY OF PSA TOTAL: CPT

## 2017-11-12 PROCEDURE — 87186 SC STD MICRODIL/AGAR DIL: CPT

## 2017-11-12 PROCEDURE — 6370000000 HC RX 637 (ALT 250 FOR IP): Performed by: INTERNAL MEDICINE

## 2017-11-12 PROCEDURE — 80048 BASIC METABOLIC PNL TOTAL CA: CPT

## 2017-11-12 PROCEDURE — 85027 COMPLETE CBC AUTOMATED: CPT

## 2017-11-12 PROCEDURE — 87184 SC STD DISK METHOD PER PLATE: CPT

## 2017-11-12 RX ORDER — BICALUTAMIDE 50 MG/1
50 TABLET, FILM COATED ORAL DAILY
Status: DISCONTINUED | OUTPATIENT
Start: 2017-11-12 | End: 2017-11-16 | Stop reason: HOSPADM

## 2017-11-12 RX ORDER — POTASSIUM CHLORIDE 20 MEQ/1
40 TABLET, EXTENDED RELEASE ORAL ONCE
Status: COMPLETED | OUTPATIENT
Start: 2017-11-12 | End: 2017-11-12

## 2017-11-12 RX ADMIN — Medication 10 ML: at 19:54

## 2017-11-12 RX ADMIN — PRAVASTATIN SODIUM 40 MG: 40 TABLET ORAL at 19:56

## 2017-11-12 RX ADMIN — POTASSIUM CHLORIDE 40 MEQ: 1500 TABLET, EXTENDED RELEASE ORAL at 10:54

## 2017-11-12 RX ADMIN — ASPIRIN 325 MG: 325 TABLET, COATED ORAL at 07:43

## 2017-11-12 RX ADMIN — SODIUM CHLORIDE: 9 INJECTION, SOLUTION INTRAVENOUS at 07:44

## 2017-11-12 RX ADMIN — MULTIPLE VITAMINS W/ MINERALS TAB 1 TABLET: TAB at 07:43

## 2017-11-12 RX ADMIN — LEVOTHYROXINE SODIUM 150 MCG: 150 TABLET ORAL at 05:45

## 2017-11-12 RX ADMIN — BICALUTAMIDE 50 MG: 50 TABLET, FILM COATED ORAL at 14:21

## 2017-11-12 NOTE — PROGRESS NOTES
progress      PCP: Raysa Maynard DO    Date of Admission: 11/11/2017    Date of Service: 11/11/17    Chief Complaint:  Weight loss    History Of Present Illness:    History obtained from chart review and the patient. 78 y.o. male who presented to [facility] with complaint of decreased appetite and weight loss. He apparently goes to the [place] for medical care, was in our ED yesterday and refused admission at that time due to needing to take care of his dog - now he is back. He denies any blood loss, admits to painful urination, history of thyroid problems, HTN, high cholesterol. He made no mention of cancer history during our discussion despite my mentioning the term several times as a possible etiology of his weight loss. He notes his main reason for coming in is to figure out why he has lost so much weight.     Subjective:-    Doing ok  Reviewed urology notes  Pt aware he likely has advanced prostate ca    Wanting to go home    Scheduled Meds:   [START ON 11/13/2017] pneumococcal 13-valent conjugate  0.5 mL Intramuscular Prior to discharge    [START ON 11/13/2017] influenza virus vaccine  0.5 mL Intramuscular Once    bicalutamide  50 mg Oral Daily    [START ON 11/13/2017] degarelix acetate  240 mg Subcutaneous Once    pravastatin  40 mg Oral Nightly    therapeutic multivitamin-minerals  1 tablet Oral Daily    levothyroxine  150 mcg Oral QAM AC    atenolol  25 mg Oral Daily    aspirin  325 mg Oral Q48H    sodium chloride flush  10 mL Intravenous 2 times per day     Continuous Infusions:   PRN Meds:.sodium chloride flush, sodium chloride flush, potassium chloride **OR** potassium chloride **OR** potassium chloride, acetaminophen, magnesium hydroxide, ondansetron    PHYSICAL EXAM:    /70   Pulse 76   Temp 98.1 °F (36.7 °C) (Oral)   Resp 18   Ht 6' 1.5\" (1.867 m)   Wt 139 lb 3.2 oz (63.1 kg)   SpO2 99%   BMI 18.12 kg/m²       General appearance:  No apparent distress but cachetic, appears stated age and cooperative. HEENT:  Normal cephalic, atraumatic without obvious deformity. Pupils equal, round, and reactive to light. Extra ocular muscles intact. Conjunctivae/corneas clear. Neck: Supple, with full range of motion. No jugular venous distention. Trachea midline. Respiratory:  Normal respiratory effort. Clear to auscultation, bilaterally without Rales/Wheezes/Rhonchi. Cardiovascular:  Regular rate and rhythm with normal S1/S2 without murmurs, rubs or gallops. Abdomen: Soft, non-tender, non-distended with normal bowel sounds. Musculoskeletal:  No clubbing, cyanosis or edema bilaterally. Full range of motion without deformity. Skin: Skin color, texture, turgor normal.  No rashes or lesions. Neurologic:  Neurovascularly intact without any focal sensory/motor deficits. Cranial nerves: II-XII intact, grossly non-focal.  Psychiatric:  Alert and oriented, thought content appropriate, normal insight  Capillary Refill: Brisk,< 3 seconds   Peripheral Pulses: +2 palpable, equal bilaterally       Labs:     Recent Labs      11/10/17   1738  11/11/17   1333  11/12/17   0628   WBC  4.0*  5.5  4.6*   HGB  6.8*  6.5*  7.2*   HCT  21.8*  20.4*  22.3*   PLT  655*  580*  460*     Recent Labs      11/10/17   1738  11/11/17   1333  11/12/17   0628   NA  135  135  139   K  2.9*  3.2*  3.4*   CL  93*  91*  99   CO2  29  29  27   BUN  16  18  16   CREATININE  0.6  0.7  0.6   CALCIUM  8.6  8.6  8.1*     Recent Labs      11/10/17   1738  11/11/17   1333   AST  17  22   ALT  <5*  <5*   BILIDIR   --   <0.2   BILITOT  0.4  0.4   ALKPHOS  993*  1,032*     Recent Labs      11/11/17   1333   INR  1.22*     No results for input(s): CKTOTAL, TROPONINI in the last 72 hours.     Urinalysis:      Lab Results   Component Value Date    NITRU POSITIVE 11/12/2017    WBCUA 50-75 11/12/2017    BACTERIA MANY 11/12/2017    RBCUA 0-2 11/12/2017    BLOODU NEGATIVE 11/12/2017    SPECGRAV 1.018 06/09/2014    GLUCOSEU NEGATIVE 11/12/2017       Radiology:   I have reviewed the imaging studies with the following interpretation:  No results found. Diet:  DIET GENERAL;  Dietary Nutrition Supplements: Standard High Calorie Oral Supplement  Dietary Nutrition Supplements: Frozen Oral Supplement    DVT prophylaxis: [] Lovenox                                 [x] SCDs                                 [] SQ Heparin                                 [] Encourage ambulation           [] Already on Anticoagulation    Code Status: Full Code        Disposition:    [x] Home       [] TCU       [] Rehab       [] Psych       [] SNF       [] [place]       [] Other-       Assessment and Plan:    Principal Problem:    Anemia  Active Problems:    Graves' disease    CA of prostate ([facility] [address]    LHRH Therapy    Weight loss    Lesion of bladder    Adenopathy    Elevated PSA        · Anemia of chronic disease - s/p transfusion 11/11, hbg 7.2, will monitor h and h closely, transfuse < 7  · Prostate ca,with mets - con soncology, urology seeing, chemo restarted per urology  · Unsure he will keep his f/u appts  · Wanting to go home      Thank you Madeline Weber DO for the opportunity to be involved in this patient's care.     Electronically signed by Brianna Carmona MD on 11/12/2017 at 6:15 PM

## 2017-11-12 NOTE — PLAN OF CARE
Problem: Risk for Impaired Skin Integrity  Goal: Tissue integrity - skin and mucous membranes  Structural intactness and normal physiological function of skin and  mucous membranes. Outcome: Ongoing  Skin assessment complete. No new skin issues. Noted. Small wound on coccyx, mepilex covering it. Wound on leg with mepilex covering. Pt repositions self. Problem: Falls - Risk of  Goal: Absence of falls  Outcome: Ongoing  Pt up with 1 assist. Weakness in lower extremities. Gait steady. Fall sign posted. Bed alarm on. Pt uses call light appropriately. Comments: Care plan reviewed with patient. Patient verbalized understanding of the plan of care and contributed to goal setting.

## 2017-11-12 NOTE — PROGRESS NOTES
Pt admitted to  5K2 from ED. Complaints: Weight loss. IV site on right upper arm infiltrated, removed and IV team called. Vital signs obtained. Assessment and data collection initiated. Oriented to room. All questions answered with no further questions at this time. Fall prevention discussed with patient.    The best day to schedule a follow up Dr appointment is: Naveed Youngblood

## 2017-11-12 NOTE — CONSULTS
Urology Consult Note      Reason for Consult:  Bladder wall thickening    Requesting Physician:  Dr. Buckley Garrison    History Obtained From:  patient, electronic medical record    Chief Complaint: Weight loss    HISTORY OF PRESENT ILLNESS:                The patient is a 78 y.o. male with significant past medical history of HTN, HPL, prostate cancer who presents with decreased appetite and weight loss. He has noticed weight loss for the past month. Urology was consulted due to bladder wall thickening and concern for metastatic disease. CT reports pathologically enlarged retroperitoneal lymph nodes and pathologically enlarged bilateral iliac chain lymph nodes with heterogenous osseous framework diffusely, characteristic of neoplastic process, likely with metastatic involvement, and tibial. Irregular appearance of the urinary bladder that could represent bladder wall neoplasm. He is a known patient to our practice for prostate cancer. He last saw Dr. Anaid Monsalve on 11/23/2015 and was started on Firmagon injections due to rising PSA and was to return in 1 month for another injection. Reviewing records, he called in to our office and said that he could not afford the medications. Our office made attempts to contact him to say that he needed the injections and we would find assistance programs to help with coverage, however he did not follow up. He had Robot-Assisted Laparoscopic Prostatectomy with Dr. Anaid Monsalve in 2011 and had PSA failure in which he received radiation in 2014. His PSA was continuing to rise and hence he was started on ADT. History is obtained from the patient and the medical record. He does appear to be a poor historian.       Past Medical History:        Diagnosis Date    Backache     Cancer [facility]) 2011    prostate    Goiter     Hyperlipidemia     Hypertension     Incontinence of urine 2/28/2012    Osteoarthritis      Past Surgical History:        Procedure Laterality Date    COLONOSCOPY      membranes are normal.   Eyes:         EOM are normal. No scleral icterus. Nose:    The external appearance of the nose is normal  Ears: The ears appear normal to external inspection. Cardiovascular:       Normal rate, regular rhythm. Pulmonary/Chest:  Normal respiratory rate and rhthym. No use of accessory muscles. Lungs clear bilaterally. Abdominal:          Soft. No tenderness. Active bowel sounds             Musculoskeletal:    Normal range of motion. Pain to palpation on left hip, coccyx   Extremities:    No cyanosis, clubbing, or edema present. Neurological:    Alert and oriented.      DATA:  CBC:   Lab Results   Component Value Date    WBC 4.6 11/12/2017    RBC 2.56 11/12/2017    RBC 4.29 01/23/2012    HGB 7.2 11/12/2017    HCT 22.3 11/12/2017    MCV 87.1 11/12/2017    MCH 28.3 11/12/2017    MCHC 32.5 11/12/2017    RDW 20.8 11/12/2017     11/12/2017    MPV 6.2 11/12/2017     BMP:    Lab Results   Component Value Date     11/12/2017    K 3.4 11/12/2017    CL 99 11/12/2017    CO2 27 11/12/2017    BUN 16 11/12/2017    CREATININE 0.6 11/12/2017    CALCIUM 8.1 11/12/2017    LABGLOM >90 11/12/2017    GLUCOSE 100 11/12/2017    GLUCOSE 92 10/08/2011     BUN/Creatinine:    Lab Results   Component Value Date    BUN 16 11/12/2017    CREATININE 0.6 11/12/2017     Magnesium:    Lab Results   Component Value Date    MG 2.3 11/10/2017     Phosphorus:  No results found for: PHOS  PT/INR:    Lab Results   Component Value Date    INR 1.22 11/11/2017     U/A:    Lab Results   Component Value Date    NITRITE neg 11/23/2015    COLORU YELLOW 11/12/2017    PHUR 7.5 11/12/2017    WBCUA 50-75 11/12/2017    RBCUA 0-2 11/12/2017    YEAST NONE SEEN 11/12/2017    BACTERIA MANY 11/12/2017    CLARITYU Clear 10/11/2016    SPECGRAV 1.018 06/09/2014    LEUKOCYTESUR MODERATE 11/12/2017    UROBILINOGEN 1.0 11/12/2017    BILIRUBINUR NEGATIVE 11/12/2017    BLOODU NEGATIVE 11/12/2017    GLUCOSEU NEGATIVE 11/12/2017 2015, it appears he received one injection of Chris Artem in the office, and called in and reported he could not afford the medication. Our office made several attempts to contact and get assistance for patient, but he has not followed up since 2015. PSA currently is 646, compared to 0.88 on 02/03/2016. CT scan is concerning for metastatic disease, he does complain of left hip and coccyx pain. At this time, would like to give him injection of Chris Artem here inpatient and start him on Casodex as well. Discussed with pharmacy, they are ordering Chris Artem, can be here on Tuesday. Bone scan ordered for further evaluation of metastatic disease    Medical oncology consult for metastatic prostate cancer    Will need cystoscopy outpatient with Dr. Ren Rico for further evaluation of bladder wall thickening, possible bladder mass? ? Thank you for including us in the care of Garret Roth Do [address]  11/12/2017 8:54 AM  Urology    CC: Weight loss, consult for bladder wall thickening    I have seen and examined the patient independently on 11/12/2017 and I have reviewed all radiology reports and films along with the pertinent laboratory values. I agree with the above assessment and plan with the following additions: None    Review of Systems  No problems with ears, nose or throat. No problems with eyes. No chest pain, shortness of breath, abdominal pain, extremity pain or weakness, and no neurological deficits. No rashes. No swollen glands or lymph nodes.  symptoms per HPI. The remainder of the review of symptoms is negative. Exam  General: alert and oriented. Cooperative. HENT: Normocephalic, Atraumatic. Eyes: No scleral icterus, mucous membranes moist.  Respiratory: Effort normal.   Skin: No rashes or obvious lesions. Radiology  The patient has had a CT scan which I have reviewed along with its accompanying report. The study demonstrates   Impression   POSSIBLE CONSTIPATION.  NO OTHER EVIDENCE OF DEFINITE ACUTE ABNORMALITY. HOWEVER, SIGNIFICANT INCIDENTAL FINDINGS ARE DEMONSTRATED, INCLUDING PATHOLOGICALLY ENLARGED RETROPERITONEAL LYMPH NODES AND PATHOLOGICALLY ENLARGED BILATERAL ILIAC CHAIN    LYMPH NODES WITH HETEROGENEOUS OSSEOUS FRAMEWORK DIFFUSELY, CHARACTERISTIC OF NEOPLASTIC PROCESS, LIKELY WITH METASTATIC INVOLVEMENT, AND TIBIAL PROVEN OTHERWISE. FURTHERMORE, THERE IS AN IRREGULAR APPEARANCE OF THE URINARY BLADDER WALL THAT COULD    REPRESENT BLADDER WALL NEOPLASM. NO OTHER FINDINGS ON THIS EXAM TO INDICATE POSSIBLE FOCUS OF PRIMARY NEOPLASM.       INCIDENTAL FINDINGS AS DISCUSSED.        Lab Results   Component Value Date    .70 (H) 11/12/2017    PSA 0.88 02/03/2016    PSA 2.33 12/11/2015       Imp:   Prostate cancer-post RALP, XRT for pSa failure  PSA recurrence  Retroperitoneal Lymphadenopathy  Bladder wall thickening      Plan:   Prostate cancer, PSA recurrence- will initiate on Firmagon and Casodex  Retroperitoneal Lymphadenopathy- will need medical oncology consult  Bladder wall thickening- needs TURBT in the near future with Dr Jeremiah Michel to rule out bladder cancer      Yasmani Rodney MD on 11/12/2017 at 2:20 PM

## 2017-11-12 NOTE — PLAN OF CARE
Problem: Risk for Impaired Skin Integrity  Goal: Tissue integrity - skin and mucous membranes  Structural intactness and normal physiological function of skin and  mucous membranes. Outcome: Ongoing  No new skin breakdown. Ulcer on right lateral inner calf. Two ulcers on coccyx. Calf and coccyx covered with mepilex. Poor nutrition. Lost over 30lbs unintentionally over the past few months. Problem: Falls - Risk of  Goal: Absence of falls  Outcome: Ongoing  No falls this shift. Up with assist. Bed in lowest position and locked. Bed alarm activated. Call light within reach. Problem: Discharge Planning:  Goal: Discharged to appropriate level of care  Discharged to appropriate level of care  Outcome: Ongoing  Home at discharge. Problem: Nutrition Deficit:  Goal: Ability to achieve adequate nutritional intake will improve  Ability to achieve adequate nutritional intake will improve  Outcome: Ongoing  Encouraged food intake. Comments: Care plan reviewed with patient. Patient verbalize understanding of the plan of care and contribute to goal setting.

## 2017-11-13 ENCOUNTER — APPOINTMENT (OUTPATIENT)
Dept: NUCLEAR MEDICINE | Age: 67
DRG: 722 | End: 2017-11-13
Payer: MEDICARE

## 2017-11-13 LAB
ANION GAP SERPL CALCULATED.3IONS-SCNC: 14 MEQ/L (ref 8–16)
BUN BLDV-MCNC: 14 MG/DL (ref 7–22)
CALCIUM SERPL-MCNC: 8.3 MG/DL (ref 8.5–10.5)
CHLORIDE BLD-SCNC: 98 MEQ/L (ref 98–111)
CO2: 25 MEQ/L (ref 23–33)
CREAT SERPL-MCNC: 0.5 MG/DL (ref 0.4–1.2)
GFR SERPL CREATININE-BSD FRML MDRD: > 90 ML/MIN/1.73M2
GLUCOSE BLD-MCNC: 79 MG/DL (ref 70–108)
HCT VFR BLD CALC: 25.3 % (ref 42–52)
HEMOCCULT STL QL: NEGATIVE
HEMOGLOBIN: 8.1 GM/DL (ref 14–18)
MCH RBC QN AUTO: 27.6 PG (ref 27–31)
MCHC RBC AUTO-ENTMCNC: 32.1 GM/DL (ref 33–37)
MCV RBC AUTO: 85.9 FL (ref 80–94)
PDW BLD-RTO: 20.4 % (ref 11.5–14.5)
PLATELET # BLD: 492 THOU/MM3 (ref 130–400)
PMV BLD AUTO: 6.2 MCM (ref 7.4–10.4)
POTASSIUM SERPL-SCNC: 4 MEQ/L (ref 3.5–5.2)
RBC # BLD: 2.95 MILL/MM3 (ref 4.7–6.1)
SODIUM BLD-SCNC: 137 MEQ/L (ref 135–145)
WBC # BLD: 5.3 THOU/MM3 (ref 4.8–10.8)

## 2017-11-13 PROCEDURE — 2580000003 HC RX 258: Performed by: INTERNAL MEDICINE

## 2017-11-13 PROCEDURE — 80048 BASIC METABOLIC PNL TOTAL CA: CPT

## 2017-11-13 PROCEDURE — 6370000000 HC RX 637 (ALT 250 FOR IP): Performed by: FAMILY MEDICINE

## 2017-11-13 PROCEDURE — 99233 SBSQ HOSP IP/OBS HIGH 50: CPT | Performed by: FAMILY MEDICINE

## 2017-11-13 PROCEDURE — 85027 COMPLETE CBC AUTOMATED: CPT

## 2017-11-13 PROCEDURE — A9503 TC99M MEDRONATE: HCPCS | Performed by: NURSE PRACTITIONER

## 2017-11-13 PROCEDURE — 82272 OCCULT BLD FECES 1-3 TESTS: CPT

## 2017-11-13 PROCEDURE — 6360000002 HC RX W HCPCS: Performed by: UROLOGY

## 2017-11-13 PROCEDURE — 6370000000 HC RX 637 (ALT 250 FOR IP): Performed by: NURSE PRACTITIONER

## 2017-11-13 PROCEDURE — 6370000000 HC RX 637 (ALT 250 FOR IP): Performed by: INTERNAL MEDICINE

## 2017-11-13 PROCEDURE — 36415 COLL VENOUS BLD VENIPUNCTURE: CPT

## 2017-11-13 PROCEDURE — 3430000000 HC RX DIAGNOSTIC RADIOPHARMACEUTICAL: Performed by: NURSE PRACTITIONER

## 2017-11-13 PROCEDURE — 1200000000 HC SEMI PRIVATE

## 2017-11-13 PROCEDURE — 78306 BONE IMAGING WHOLE BODY: CPT

## 2017-11-13 RX ORDER — POLYETHYLENE GLYCOL 3350 17 G/17G
238 POWDER, FOR SOLUTION ORAL ONCE
Status: COMPLETED | OUTPATIENT
Start: 2017-11-13 | End: 2017-11-13

## 2017-11-13 RX ORDER — TC 99M MEDRONATE 20 MG/10ML
24.8 INJECTION, POWDER, LYOPHILIZED, FOR SOLUTION INTRAVENOUS
Status: COMPLETED | OUTPATIENT
Start: 2017-11-13 | End: 2017-11-13

## 2017-11-13 RX ORDER — CIPROFLOXACIN 500 MG/1
500 TABLET, FILM COATED ORAL EVERY 12 HOURS SCHEDULED
Status: DISCONTINUED | OUTPATIENT
Start: 2017-11-13 | End: 2017-11-16 | Stop reason: HOSPADM

## 2017-11-13 RX ORDER — SENNA PLUS 8.6 MG/1
15 TABLET ORAL ONCE
Status: COMPLETED | OUTPATIENT
Start: 2017-11-13 | End: 2017-11-13

## 2017-11-13 RX ADMIN — CIPROFLOXACIN 500 MG: 500 TABLET, FILM COATED ORAL at 12:58

## 2017-11-13 RX ADMIN — Medication 10 ML: at 20:30

## 2017-11-13 RX ADMIN — Medication 24.8 MILLICURIE: at 08:00

## 2017-11-13 RX ADMIN — CIPROFLOXACIN 500 MG: 500 TABLET, FILM COATED ORAL at 22:32

## 2017-11-13 RX ADMIN — BICALUTAMIDE 50 MG: 50 TABLET, FILM COATED ORAL at 09:23

## 2017-11-13 RX ADMIN — DEGARELIX 240 MG: 40 POWDER, METERED SUBCUTANEOUS at 12:11

## 2017-11-13 RX ADMIN — Medication 10 ML: at 09:23

## 2017-11-13 RX ADMIN — POLYETHYLENE GLYCOL 3350 238 G: 17 POWDER, FOR SOLUTION ORAL at 22:35

## 2017-11-13 RX ADMIN — PRAVASTATIN SODIUM 40 MG: 40 TABLET ORAL at 22:32

## 2017-11-13 RX ADMIN — LEVOTHYROXINE SODIUM 150 MCG: 150 TABLET ORAL at 05:38

## 2017-11-13 RX ADMIN — SENNA 129 MG: 8.6 TABLET, COATED ORAL at 22:34

## 2017-11-13 RX ADMIN — MULTIPLE VITAMINS W/ MINERALS TAB 1 TABLET: TAB at 09:23

## 2017-11-13 NOTE — FLOWSHEET NOTE
11/13/17 1715   Encounter Summary   Services provided to: Patient   Referral/Consult From: [email or web address]   Support System Unknown   Continue Visiting No  (11/13)   Complexity of Encounter Low   Length of Encounter 15 minutes   Spiritual/Faith   Type Spiritual support   Grief and Life Adjustment   Type New Diagnosis   Assessment Approachable   Intervention Nurtured hope   Outcome Expressed gratitude   11/13/17-Patient was sitting in his bed watching TV. There were so many food dishes and extra crackers on his tray it made this staff wonder if he was hoarding items. This staff made a comment about it and he laughed and said he asked them to keep everything there for him. - Asked why he was here and he didn't exactly know how to explain it. Staff asked some other routine questions to strike up conversation but the patient stated he would rather just be alone. - Staff left and no follow-up conversations are required unless patient requests.

## 2017-11-13 NOTE — PLAN OF CARE
Problem: Risk for Impaired Skin Integrity  Goal: Tissue integrity - skin and mucous membranes  Structural intactness and normal physiological function of skin and  mucous membranes. Outcome: Ongoing  Scab noted to right medial calf region. Wound to coccyx. Mepilex in place for both areas. No new skin issues noted this shift. Patient has lost approximately 30 pounds in the last month and states he has had poor food intake. Problem: Falls - Risk of  Goal: Absence of falls  Outcome: Ongoing  Fall precautions in place. Bed in low position. Call light and side table within reach. No falls noted this shift. Patient alert and oriented x 4. Using call light appropriately. Problem: Discharge Planning:  Goal: Discharged to appropriate level of care  Discharged to appropriate level of care   Outcome: Ongoing  Discharge planning ongoing. Patient plans for discharge to private residence. Problem: Nutrition Deficit:  Goal: Ability to achieve adequate nutritional intake will improve  Ability to achieve adequate nutritional intake will improve   Outcome: Ongoing  Patient eating 100% of meals this shift. Nutritional supplements included with meals. Problem: Nutrition  Goal: Optimal nutrition therapy  Outcome: Ongoing      Comments: Care plan reviewed with patient. Patient verbalized understanding of the plan of care and contribute to goal setting.

## 2017-11-13 NOTE — PLAN OF CARE
Problem: Nutrition  Goal: Optimal nutrition therapy  Outcome: Ongoing  Nutrition Problem: Severe malnutrition, in context of acute illness or injury  Intervention: Food and/or Nutrient Delivery: Continue current diet, Start ONS, Modify current ONS, Mineral Supplement, Vitamin Supplement  Nutritional Goals: Patient will consume 75% or greater of meals and ONS during LOS

## 2017-11-13 NOTE — PROGRESS NOTES
Nutrition Assessment    Type and Reason for Visit: Initial, Positive Nutrition Screen (weight loss, poor po)    Nutrition Recommendations: Continue current diet and ONS. Continue multivitamin. Malnutrition Assessment:  · Malnutrition Status: Meets the criteria for severe malnutrition  · Context: Acute illness or injury  · Findings of the 6 clinical characteristics of malnutrition (Minimum of 2 out of 6 clinical characteristics is required to make the diagnosis of moderate or severe Protein Calorie Malnutrition based on AND/ASPEN Guidelines):  1. Energy Intake-Not available (decreased intake pta), greater than 7 days    2. Weight Loss-10% loss or greater (per information in nursing notes), in 3 months (per information in nursing notes)  3. Fat Loss-Moderate subcutaneous fat loss, Fat overlying the ribs, Triceps  4. Muscle Loss-Moderate muscle mass loss, Clavicles (pectoralis and deltoids)  5. Fluid Accumulation-No significant fluid accumulation,    6.  Strength-Not measured    Nutrition Diagnosis:   · Problem: Severe malnutrition, in context of acute illness or injury  · Etiology: related to Catabolic illness, Insufficient energy/nutrient consumption     Signs and symptoms:  as evidenced by Weight loss, Moderate muscle loss, Moderate loss of subcutaneous fat    Nutrition Assessment:  · Subjective Assessment: Patient admitted with anemia. Patient with hx of prostate cancer with mets. Patient came to ED with decreased appetite and unintentional weight loss. Patient seen and has eaten 100% of lunch today. Patient says that his appetite is good, patient does admit to weight loss. Patient says that he has been getting mostly liquids (has orange juice and ensure at bedside. I provided patient with crackers and peanut butter for snacking. Patient says that the ensure is OK and he will continue to drink. Patient reports that he has not gotten the [place]Cuco but is willing to try them. · Nutrition-Focused Physical Findings:    · Wound Type: Ulcer (inner calf & coccyx)  · Current Nutrition Therapies:  · Oral Diet Orders: General   · Oral Diet intake: %  · Oral Nutrition Supplement (ONS) Orders: Standard High Calorie Oral Supplement, Frozen Oral Supplement (ensure enlive TID; magic cup TID)  · ONS intake: %  · Anthropometric Measures:  · Ht: 6' 1.5\" (186.7 cm)   · Current Body Wt: 139 lb (63 kg)  · Admission Body Wt: 139 lb (63 kg)  · Usual Body Wt:  (n/a - only stated weight available (170# on 11/10/17))  · % Weight Change: 30# (per nursing notes),  a few months per nuring notes  · Ideal Body Wt: 187 lb (84.8 kg), % Ideal Body 74%  · Adjusted Body Wt: 151 lb (68.5 kg), body weight adjusted for  (underweight)  · BMI Classification: BMI <18.5 Underweight (18.2)  · Comparative Standards (Estimated Nutrition Needs):  · Estimated Daily Total Kcal: 3678-5142  · Estimated Daily Protein (g): 68-82    Estimated Intake vs Estimated Needs: Intake Improving    Nutrition Risk Level: High    Nutrition Interventions:   Continue current diet, Start ONS, Modify current ONS, Mineral Supplement, Vitamin Supplement  Continued Inpatient Monitoring, Education Not Indicated    Nutrition Evaluation:   · Evaluation: Goals set   · Goals: Patient will consume 75% or greater of meals and ONS during LOS    · Monitoring: Meal Intake, Supplement Intake, Diet Tolerance, Wound Healing, Weight, Comparative Standards, Pertinent Labs    See Adult Nutrition Doc Flowsheet for more detail.      Electronically signed by Maco Montaño RD, LD on 11/13/17 at 2:18 PM    Contact Number: [phone]

## 2017-11-13 NOTE — PROGRESS NOTES
Full consult will be dictated. Patient is willing to be followed by his Oncologist at [place]. I will see him as needed in the future.

## 2017-11-13 NOTE — PROGRESS NOTES
Dr. Lamar Overton contacted regarding consult. Dr. Lamar Overton office states patient has been discharged from practice. Dr. Aristides Palacios on call. Dr. Aristides Palacios to see patient today.

## 2017-11-13 NOTE — CARE COORDINATION
17, 7:38 AM      Greeley Paul Mon Sr. Admitted from: ED 2017/ [address] day: 2   Location: --A Reason for admit: Anemia [D64.9] Status: inpt  Admit order signed?: yes  PMH:  has a past medical history of Backache; Cancer ([address] Goiter; Hyperlipidemia; Hypertension; Incontinence of urine; and Osteoarthritis. Procedure: no  Pertinent abnormal Imagin/10:   POSSIBLE CONSTIPATION. NO OTHER EVIDENCE OF DEFINITE ACUTE ABNORMALITY. HOWEVER, SIGNIFICANT INCIDENTAL FINDINGS ARE DEMONSTRATED, INCLUDING PATHOLOGICALLY ENLARGED RETROPERITONEAL LYMPH NODES AND PATHOLOGICALLY ENLARGED BILATERAL ILIAC CHAIN    LYMPH NODES WITH HETEROGENEOUS OSSEOUS FRAMEWORK DIFFUSELY, CHARACTERISTIC OF NEOPLASTIC PROCESS, LIKELY WITH METASTATIC INVOLVEMENT, AND TIBIAL PROVEN OTHERWISE. FURTHERMORE, THERE IS AN IRREGULAR APPEARANCE OF THE URINARY BLADDER WALL THAT COULD    REPRESENT BLADDER WALL NEOPLASM. NO OTHER FINDINGS ON THIS EXAM TO INDICATE POSSIBLE FOCUS OF PRIMARY NEOPLASM.       INCIDENTAL FINDINGS AS DISCUSSED       Medications:  Scheduled Meds:   pneumococcal 13-valent conjugate  0.5 mL Intramuscular Prior to discharge    influenza virus vaccine  0.5 mL Intramuscular Once    bicalutamide  50 mg Oral Daily    degarelix acetate  240 mg Subcutaneous Once    pravastatin  40 mg Oral Nightly    therapeutic multivitamin-minerals  1 tablet Oral Daily    levothyroxine  150 mcg Oral QAM AC    atenolol  25 mg Oral Daily    aspirin  325 mg Oral Q48H    sodium chloride flush  10 mL Intravenous 2 times per day     Continuous Infusions:    Pertinent Info/Orders/Treatment Plan:  Bone scan scheduled, Casodex Chemo tablet, consulted oncology, urology  Diet: DIET GENERAL;  Dietary Nutrition Supplements: Standard High Calorie Oral Supplement  Dietary Nutrition Supplements: Frozen Oral Supplement   DVT Prophylaxis: SCD's ordered  Smoking status:  reports that he quit smoking about 36 years ago.  His smoking use included Cigarettes, Pipe, and Cigars. He has never used smokeless tobacco.   Influenza Vaccination Screening Completed: yes  Pneumonia Vaccination Screening Completed: yes  Core measures: vte  PCP: Renata Murray DO  Readmission:   none  Risk Score: 9.5     Discharge Planning  Current Residence:  Private Residence  Living Arrangements:  Alone   Support Systems:  Children, Family Members  Current Services PTA:     Potential Assistance Needed:  N/A  Potential Assistance Purchasing Medications:  No  Does patient want to participate in local refill/ meds to beds program?  N/A  Type of Home Care Services:  None  Patient expects to be discharged to:  Private residence  Expected Discharge date:  11/15/17  Follow Up Appointment: Best Day/ Time: Monday AM    Discharge Plan: patient plans home alone, denies home health or any dme, he asked \"Why so many people keep asking where I live, you have my address. \" appears offended, I explained that we need to know he will be discharged to a safe, warm environment so can be well.       Evaluation: no

## 2017-11-13 NOTE — PROGRESS NOTES
0.7  0.6  0.5   CALCIUM  8.6  8.1*  8.3*     Recent Labs      11/10/17   1738  11/11/17   1333   AST  17  22   ALT  <5*  <5*   BILIDIR   --   <0.2   BILITOT  0.4  0.4   ALKPHOS  993*  1,032*     Recent Labs      11/11/17   1333   INR  1.22*     No results for input(s): Gloria Apa in the last 72 hours.     Urinalysis:    Lab Results   Component Value Date    NITRU POSITIVE 11/12/2017    WBCUA 50-75 11/12/2017    BACTERIA MANY 11/12/2017    RBCUA 0-2 11/12/2017    BLOODU NEGATIVE 11/12/2017    SPECGRAV 1.018 06/09/2014    GLUCOSEU NEGATIVE 11/12/2017       Radiology:  NM BONE SCAN WHOLE BODY    (Results Pending)       Diet: DIET GENERAL;  Dietary Nutrition Supplements: Standard High Calorie Oral Supplement  Dietary Nutrition Supplements: Frozen Oral Supplement    [place]: no    Microbiology:  Urine cx 11/12 (p)    Tele:  none    DVT prophylaxis: [] Lovenox                                 [x] SCDs                                 [] SQ Heparin                                 [] Encourage ambulation           [] Already on Anticoagulation     Disposition:    [x] Home       [] TCU       [] Rehab       [] Psych       [] SNF       [] [place]       [] Other-    Code Status: Full Code    PT/OT Eval Status: monitor for need      Electronically signed by Jerry Lima MD on 11/13/2017 at 10:25 AM when pt evaluated - final note filed late

## 2017-11-14 ENCOUNTER — TELEPHONE (OUTPATIENT)
Dept: UROLOGY | Age: 67
End: 2017-11-14

## 2017-11-14 LAB
ORGANISM: ABNORMAL
URINE CULTURE REFLEX: ABNORMAL

## 2017-11-14 PROCEDURE — 0DJ08ZZ INSPECTION OF UPPER INTESTINAL TRACT, VIA NATURAL OR ARTIFICIAL OPENING ENDOSCOPIC: ICD-10-PCS | Performed by: INTERNAL MEDICINE

## 2017-11-14 PROCEDURE — 99152 MOD SED SAME PHYS/QHP 5/>YRS: CPT | Performed by: INTERNAL MEDICINE

## 2017-11-14 PROCEDURE — 99231 SBSQ HOSP IP/OBS SF/LOW 25: CPT | Performed by: NURSE PRACTITIONER

## 2017-11-14 PROCEDURE — 99153 MOD SED SAME PHYS/QHP EA: CPT | Performed by: INTERNAL MEDICINE

## 2017-11-14 PROCEDURE — 6370000000 HC RX 637 (ALT 250 FOR IP): Performed by: FAMILY MEDICINE

## 2017-11-14 PROCEDURE — 0DBK8ZX EXCISION OF ASCENDING COLON, VIA NATURAL OR ARTIFICIAL OPENING ENDOSCOPIC, DIAGNOSTIC: ICD-10-PCS | Performed by: INTERNAL MEDICINE

## 2017-11-14 PROCEDURE — 99232 SBSQ HOSP IP/OBS MODERATE 35: CPT | Performed by: FAMILY MEDICINE

## 2017-11-14 PROCEDURE — 90686 IIV4 VACC NO PRSV 0.5 ML IM: CPT | Performed by: INTERNAL MEDICINE

## 2017-11-14 PROCEDURE — 3609017100 HC EGD: Performed by: INTERNAL MEDICINE

## 2017-11-14 PROCEDURE — 6360000002 HC RX W HCPCS: Performed by: INTERNAL MEDICINE

## 2017-11-14 PROCEDURE — 1200000000 HC SEMI PRIVATE

## 2017-11-14 PROCEDURE — 0DBH8ZX EXCISION OF CECUM, VIA NATURAL OR ARTIFICIAL OPENING ENDOSCOPIC, DIAGNOSTIC: ICD-10-PCS | Performed by: INTERNAL MEDICINE

## 2017-11-14 PROCEDURE — 3609010600 HC COLONOSCOPY POLYPECTOMY SNARE/COLD BIOPSY: Performed by: INTERNAL MEDICINE

## 2017-11-14 PROCEDURE — 88305 TISSUE EXAM BY PATHOLOGIST: CPT

## 2017-11-14 PROCEDURE — G0008 ADMIN INFLUENZA VIRUS VAC: HCPCS | Performed by: INTERNAL MEDICINE

## 2017-11-14 PROCEDURE — 6370000000 HC RX 637 (ALT 250 FOR IP): Performed by: INTERNAL MEDICINE

## 2017-11-14 PROCEDURE — 2580000003 HC RX 258: Performed by: INTERNAL MEDICINE

## 2017-11-14 PROCEDURE — 6370000000 HC RX 637 (ALT 250 FOR IP): Performed by: NURSE PRACTITIONER

## 2017-11-14 RX ORDER — MIDAZOLAM HYDROCHLORIDE 1 MG/ML
INJECTION INTRAMUSCULAR; INTRAVENOUS PRN
Status: DISCONTINUED | OUTPATIENT
Start: 2017-11-14 | End: 2017-11-14 | Stop reason: HOSPADM

## 2017-11-14 RX ORDER — POLYETHYLENE GLYCOL 3350 17 G/17G
17 POWDER, FOR SOLUTION ORAL DAILY
Status: DISCONTINUED | OUTPATIENT
Start: 2017-11-14 | End: 2017-11-16 | Stop reason: HOSPADM

## 2017-11-14 RX ORDER — FENTANYL CITRATE 50 UG/ML
INJECTION, SOLUTION INTRAMUSCULAR; INTRAVENOUS PRN
Status: DISCONTINUED | OUTPATIENT
Start: 2017-11-14 | End: 2017-11-14 | Stop reason: HOSPADM

## 2017-11-14 RX ADMIN — LEVOTHYROXINE SODIUM 150 MCG: 150 TABLET ORAL at 06:02

## 2017-11-14 RX ADMIN — Medication 10 ML: at 20:12

## 2017-11-14 RX ADMIN — BICALUTAMIDE 50 MG: 50 TABLET, FILM COATED ORAL at 10:45

## 2017-11-14 RX ADMIN — Medication 10 ML: at 10:46

## 2017-11-14 RX ADMIN — CIPROFLOXACIN 500 MG: 500 TABLET, FILM COATED ORAL at 10:45

## 2017-11-14 RX ADMIN — PRAVASTATIN SODIUM 40 MG: 40 TABLET ORAL at 20:12

## 2017-11-14 RX ADMIN — INFLUENZA A VIRUS A/SINGAPORE/GP1908/2015 IVR-180A (H1N1) ANTIGEN (PROPIOLACTONE INACTIVATED), INFLUENZA A VIRUS A/HONG KONG/4801/2014 X-263B (H3N2) ANTIGEN (PROPIOLACTONE INACTIVATED), INFLUENZA B VIRUS B/BRISBANE/46/2015 ANTIGEN (PROPIOLACTONE INACTIVATED), AND INFLUENZA B VIRUS B/PHUKET/3073/2013 BVR-1B ANTIGEN (PROPIOLACTONE INACTIVATED) 0.5 ML: 15; 15; 15; 15 INJECTION, SUSPENSION INTRAMUSCULAR at 10:47

## 2017-11-14 RX ADMIN — CIPROFLOXACIN 500 MG: 500 TABLET, FILM COATED ORAL at 20:12

## 2017-11-14 ASSESSMENT — PAIN - FUNCTIONAL ASSESSMENT: PAIN_FUNCTIONAL_ASSESSMENT: 0-10

## 2017-11-14 NOTE — CONSULTS
Consult Note    Patient: Ansley Riding : 1950  Med Rec#: [id number] Acc#: [id number]   Provider Performing Procedure: Corinne Lyon  Primary Care Physician: Sowmya Yan DO     Chief Complaint   Patient presents with    Weight Loss   anemia abdominal pain , weight loss , constipation . No other GI complains . ROS  General Denies any fever or chills  HEENT Denies any diplopia, tinnitus or vertigo  Resp Denies SOB, chest pain, hemoptysis  Cardiac Denies any chest pain, palpitations, claudication or edema  GI Denies any melena, hematochezia, hematemesis or pyrosis   Denies any frequency, urgency, hesitancy or incontinence  Heme Denies bruising or bleeding easily  Endocrine Denies any history of diabetes or thyroid disease  Neuro Denies any focal motor or sensory deficits  Psychiatric Denies anxiety, depression, suicidal ideation  Skin Denies rashes, itching, open sores    MEDICAL HISTORY   has a past medical history of Backache; Cancer ([address] Goiter; Hyperlipidemia; Hypertension; Incontinence of urine; and Osteoarthritis. SURGICAL HISTORY   has a past surgical history that includes ostate surgery (); Thyroid surgery (); and Colonoscopy.   Additional information:       ALLERGIES   Allergies as of 2017    (No Known Allergies)     Additional information:       MEDICATIONS   Coumadin Use Last 7 Days [x]No []Yes  Antiplatelet drug therapy use last 7 days  [x]No []Yes  Other anticoagulant use last 7 days  [x]No []Yes  Current Facility-Administered Medications   Medication Dose Route Frequency Provider Last Rate Last Dose    ciprofloxacin (CIPRO) tablet 500 mg  500 mg Oral 2 times per day lAverto Lowe MD   500 mg at 17 1258    polyethylene glycol (GLYCOLAX) powder 255 g  255 g Oral Once Haylee Almanzar MD        senna (SENOKOT) tablet 129 mg  15 tablet Oral Once Haylee Almanzar MD        pneumococcal 13-valent conjugate (PREVNAR) injection 0.5 mL  0.5 mL Intramuscular 105/59   Pulse 80   Temp 98.9 °F (37.2 °C) (Oral)   Resp 17   Ht 6' 1.5\" (1.867 m)   Wt 139 lb 3.2 oz (63.1 kg)   SpO2 94%   BMI 18.12 kg/m²     CBC:   Recent Labs      11/13/17   1200   WBC  5.3   HGB  8.1*   HCT  25.3*   MCV  85.9   PLT  492*     BMP:   Recent Labs      11/13/17   0839   NA  137   K  4.0   CL  98   CO2  25   BUN  14   CREATININE  0.5   CALCIUM  8.3*   GLUCOSE  79     PT/INR:   Recent Labs      11/11/17   1333   INR  1.22*     APTT:   Recent Labs      11/11/17   1333   APTT  23.7      Lipids:   Recent Labs      11/11/17   1333   ALKPHOS  1,032*   ALT  <5*   AST  22   BILITOT  0.4   BILIDIR  <0.2   LABALBU  3.7     Troponin: No results for input(s): TROPONINI in the last 72 hours. Hepatic:   Recent Labs      11/11/17   1333   AST  22   ALT  <5*   BILITOT  0.4   ALKPHOS  1,032*                     IMAGING  Ct Abdomen Pelvis Wo Iv Contrast Additional Contrast? None    Result Date: 11/10/2017  PROCEDURE: CT ABDOMEN PELVIS WO IV CONTRAST CLINICAL INFORMATION: ABDOMINAL PAIN, COMPARISON: Abdomen pelvis CT, 22 October 2015. TECHNIQUE: With the patient in supine position, axial images were obtained, extending from the lung bases to the pelvic floor. No IV or oral contrast prep provided. Images were reconstructed in the axial, coronal, and sagittal planes at 5 mm thickness. All CT scans at this facility use dose modulation, iterative reconstruction, and/or weight-based dosing when appropriate to reduce the radiation dose to as low as reasonably achievable. FINDINGS: The liver size is satisfactory. The attenuation of the liver parenchyma is homogeneously increased, a nonspecific finding that can be seen in deposition of certain metals, such as copper in Jose's disease and iron in hemachromatosis as well as glycogen storage diseases and sequelae of certain medications. There is no discrete liver lesion. Gallbladder and biliary system are nonspecific. Pancreas has no focal abnormality.  Spleen is within normal limits. Small bowel is decompressed. It is grossly nonspecific. There is prominent fecal retention throughout the colon, especially in the proximal colon and the rectosigmoid vault. This is similar to the prior CT. There are hyperattenuating features of the fecal material that suggests possible sequelae of compromised GI motility. There is no evidence of mesenteric fat stranding. No free fluid or free air. Neither kidney has evidence of hydronephrosis. No strong evidence of focal lesion appreciated in either kidney. Adrenal glands are acceptable. Retroperitoneal vasculature is of normal caliber. However, there are diffuse pathologically enlarged retroperitoneal lymph nodes which have developed since the prior exam. The largest of these have short axis diameters of 15-17 mm. No evidence to indicate necrosis of the lymph nodes, to the limits of noncontrast imaging. Retroperitoneal fat is clear. There are pathologically enlarged bilateral iliac chain lymph nodes also. There is an irregular thickened appearance of the urinary bladder that is poorly defined. The bladder is incompletely distended. While evaluation of the bladder is limited in a decompressed urinary bladder, combination of findings is suspicious for true bladder wall abnormality in this exam. This can represent neoplastic process as well as infectious inflammatory process, such as bladder wall neoplasm. Images of the lung bases are grossly clear. There are diffuse osseous abnormalities. There is extensive heterogeneous appearance of the osseous framework throughout all structures. This is a significant interval change since the prior exam, and is consistent with diffuse osseous metastatic lesions  until proven otherwise. No evidence of fracture. Soft tissues are nonspecific. No convincing pathologically enlarged femoral lymph nodes though the femoral lymph nodes are slightly prominent, short axis diameters of approximately 8-9 mm. POSSIBLE CONSTIPATION. NO OTHER EVIDENCE OF DEFINITE ACUTE ABNORMALITY. HOWEVER, SIGNIFICANT INCIDENTAL FINDINGS ARE DEMONSTRATED, INCLUDING PATHOLOGICALLY ENLARGED RETROPERITONEAL LYMPH NODES AND PATHOLOGICALLY ENLARGED BILATERAL ILIAC CHAIN  LYMPH NODES WITH HETEROGENEOUS OSSEOUS FRAMEWORK DIFFUSELY, CHARACTERISTIC OF NEOPLASTIC PROCESS, LIKELY WITH METASTATIC INVOLVEMENT, AND TIBIAL PROVEN OTHERWISE. FURTHERMORE, THERE IS AN IRREGULAR APPEARANCE OF THE URINARY BLADDER WALL THAT COULD REPRESENT BLADDER WALL NEOPLASM. NO OTHER FINDINGS ON THIS EXAM TO INDICATE POSSIBLE FOCUS OF PRIMARY NEOPLASM. INCIDENTAL FINDINGS AS DISCUSSED. **This report has been created using voice recognition software. It may contain minor errors which are inherent in voice recognition technology. ** Final report electronically signed by Dr. Hussein Allan on 11/10/2017 6:14 PM    Xr Chest Portable    Result Date: 11/10/2017  PROCEDURE: XR CHEST PORTABLE CLINICAL INFORMATION: weight loss, COMPARISON: Chest radiograph, 27 February 2011 TECHNIQUE: Standard portable AP view of the chest provided, timed at 1720 hours. FINDINGS: Mildly hyperinflated lungs are demonstrated. No evidence of focal alveolar consolidation. No evidence of focal abnormal mass. There is no pneumothorax or pleural effusion. Satisfactory and stable appearance of the cardiac silhouette and mediastinum. No pulmonary vascular congestion. Stable osseous framework. STABLE CHRONIC FINDINGS; NO ACUTE CARDIOPULMONARY PROCESS. **This report has been created using voice recognition software. It may contain minor errors which are inherent in voice recognition technology. ** Final report electronically signed by Dr. Hussein Allan on 11/10/2017 5:30 PM    Nm Bone Scan Whole Body    Result Date: 11/13/2017  PROCEDURE: NM BONE SCAN WHOLE BODY CLINICAL INFORMATION: Prostate cancer s/p RALRP 2011, PSA > 600 .  COMPARISON: 10/22/2015, in addition correlation is made with the CT abdomen

## 2017-11-14 NOTE — CONSULTS
[address] [place] [address]                                   CONSULTATION    PATIENT NAME: Chelsy López                    :        1950  MED REC NO:   [id number]                           ROOM:       0002  ACCOUNT NO:   [de-identified]                           ADMIT DATE: 2017  PROVIDER:     Misti Cortez M.D.    Capo Yañez:  2017    HISTORY OF PRESENT ILLNESS:  The patient is a 79-year-old gentleman who  presented to [place] [address] with decreased appetite. Lost  significant weight. The patient usually takes his medical care at the [facility]  and going through the records, the patient does have significant history of  prostate cancer. He is getting treatment at the [facility] and he was seen by  Urology service, Dr. Jaron Peña before. The patient also noted to have  severe anemia. The patient denies any pain at the moment. The patient has  been seen recently by the [facility]. He was seen by Dr. Jaron Peña group this  admission. The patient underwent a prostatectomy by Dr. Jaron Peña in . He did have PSA elevated. He received radiation therapy in . His PSA  continued to increase. The patient was started on antihormonal treatment. PAST MEDICAL HISTORY:  1.  Prostate cancer. 2.  Goiter. 3.  Hyperlipidemia. 4.  Hypertension. 5.  Incontinence of urine. 6.  Osteoarthritis. PAST SURGICAL HISTORY:  1. Colonoscopy. 2.  Prostate surgery. 3.  Thyroid surgery. ALLERGIES:  No allergies. SOCIAL HISTORY:  He is single. He does not have children. He used to work  as a . He quit smoking many years back. He drinks alcohol  occasionally. REVIEW OF SYSTEMS:  Twelve systems were reviewed. Pertinent positive  findings were mentioned in the history of present illness. PHYSICAL EXAMINATION:  GENERAL: He is a pleasant thin gentleman in no respiratory distress.   VITAL SIGNS:  His weight is 139

## 2017-11-14 NOTE — PLAN OF CARE
Problem: Risk for Impaired Skin Integrity  Goal: Tissue integrity - skin and mucous membranes  Structural intactness and normal physiological function of skin and  mucous membranes. Outcome: Ongoing  Pt is able to turn and reposition self, encouraged to change positions. Stage 2 on coccyx covered by EPC cream.     Problem: Falls - Risk of  Intervention: Fall risk assessment  Fall risk assessment completed. Goal: Absence of falls  Outcome: Ongoing  No falls this shift. Fall precautions in place. Call light within reach and patient uses appropriately. Problem: Discharge Planning:  Goal: Discharged to appropriate level of care  Discharged to appropriate level of care   Discharge plan in process. EGD and Colonoscopy scheduled for today. Problem: Nutrition Deficit:  Goal: Ability to achieve adequate nutritional intake will improve  Ability to achieve adequate nutritional intake will improve   Outcome: Ongoing  NPO at this time for procedure. Comments: Care plan reviewed with patient. Patient verbalizes understanding of the plan of care and contribute to goal setting.

## 2017-11-14 NOTE — PROGRESS NOTES
EGD and Colonoscopy completed, patient tolerated well, photos taken, one biopsy jar labeled and sent to lab

## 2017-11-14 NOTE — TELEPHONE ENCOUNTER
[facility] and PSA to Bruce at the [facility] waiting  on new authorization

## 2017-11-14 NOTE — PROGRESS NOTES
bilaterally. Abdominal:          Soft. No tenderness. Active bowel sounds. Musculoskeletal:    Normal range of motion. He exhibits no edema or tenderness of lower extremities. Extremities:    No cyanosis, clubbing, or edema present. Neurological:    Alert and oriented. Labs:  WBC:    Lab Results   Component Value Date    WBC 5.3 11/13/2017     Hemoglobin/Hematocrit:  Lab Results   Component Value Date    HGB 8.1 11/13/2017    HCT 25.3 11/13/2017     BMP:  Lab Results   Component Value Date     11/13/2017    K 4.0 11/13/2017    CL 98 11/13/2017    CO2 25 11/13/2017    BUN 14 11/13/2017    LABALBU 3.7 11/11/2017    CREATININE 0.5 11/13/2017    CALCIUM 8.3 11/13/2017    LABGLOM >90 11/13/2017     CT 11/10/17   11/11/2017  7:59 AM Vipin, Ji Rodriguez Incoming Lab Results From [facility] Late Results F/U Results   Narrative   PROCEDURE: CT ABDOMEN PELVIS WO IV CONTRAST       CLINICAL INFORMATION: ABDOMINAL PAIN,        COMPARISON: Abdomen pelvis CT, 22 October 2015.       TECHNIQUE: With the patient in supine position, axial images were obtained, extending from the lung bases to the pelvic floor. No IV or oral contrast prep provided. Images were reconstructed in the axial, coronal, and sagittal planes at 5 mm thickness. All CT scans at this facility use dose modulation, iterative reconstruction, and/or weight-based dosing when appropriate to reduce the radiation dose to as low as reasonably achievable.           FINDINGS: The liver size is satisfactory. The attenuation of the liver parenchyma is homogeneously increased, a nonspecific finding that can be seen in deposition of certain metals, such as copper in Jose's disease and iron in hemachromatosis as well    as glycogen storage diseases and sequelae of certain medications. There is no discrete liver lesion. Gallbladder and biliary system are nonspecific. Pancreas has no focal abnormality.  Spleen is within normal limits.       Small bowel is

## 2017-11-14 NOTE — CARE COORDINATION
11/14/17  9:47 AM  I spoke with Dr Rohith Suh nurse Damari Sorto, who reviewed the patients records. In February [facility], [facility] said [facility] would [facility] him up with the [place] Urologist and then a Hematologist regarding the med fermagon. The [place] clinic tried to get a hold of him to arrange this but he is difficult to get a hold of and was never able to arrange this. The nurse states many times patient does not make his appointments and is difficult to reach and follow up on. As far as Damari Sorto the nurse could find, he never saw a oncologist or hematologist at the [place].

## 2017-11-14 NOTE — OP NOTE
Sedation/Analgesia History & Physical    Patient: Enma Nazario Sr. : 1950  Med Rec#: [id number] Acc#: [id number]   Provider Performing Procedure: Alfredo Lozoya  Primary Care Physician: Fatoumata Smith DO    PRE-PROCEDURE   Full CODE [x]Yes  DNR-CCA/DNR-CC []Yes   Brief History/Pre-Procedure Diagnosis:anemia , abdominal pain , weight loss . MEDICAL HISTORY         has a past medical history of Backache; Cancer ([address] Goiter; Hyperlipidemia; Hypertension; Incontinence of urine; and Osteoarthritis. SURGICAL HISTORY   has a past surgical history that includes ostate surgery (); Thyroid surgery (); and Colonoscopy.   Additional information:       ALLERGIES   Allergies as of 2017    (No Known Allergies)     Additional information:       MEDICATIONS   Coumadin Use Last 7 Days [x]No []Yes  Antiplatelet drug therapy use last 7 days  [x]No []Yes  Other anticoagulant use last 7 days  [x]No []Yes    Current Facility-Administered Medications:     fentaNYL (SUBLIMAZE) injection, , , PRN, Amber Butterfield MD, 50 mcg at 17 1754    midazolam (VERSED) injection, , , PRN, Amber Butterfield MD, 2 mg at 17 1754    ciprofloxacin (CIPRO) tablet 500 mg, 500 mg, Oral, 2 times per day, Milagros Danielle MD, 500 mg at 17 1045    pneumococcal 13-valent conjugate (PREVNAR) injection 0.5 mL, 0.5 mL, Intramuscular, Prior to discharge, Ally Villa MD    bicalutamide (CASODEX) chemo tablet 50 mg, 50 mg, Oral, Daily, Darian Cane, NP, 50 mg at 17 1045    sodium chloride flush 0.9 % injection 10 mL, 10 mL, Intravenous, PRN, Billy Moreno NP    pravastatin (PRAVACHOL) tablet 40 mg, 40 mg, Oral, Nightly, May Tiny, , 40 mg at 17 2232    therapeutic multivitamin-minerals 1 tablet, 1 tablet, Oral, Daily, May Tiny, , 1 tablet at 17 6099    levothyroxine (SYNTHROID) tablet 150 mcg, 150 mcg, Oral, QAM AC, Shantelle Franks DO, 150 mcg at 17 0602    atenolol (TENORMIN) tablet 25 mg, 25 mg, Oral, Daily, Marium Khadra, DO    aspirin tablet 325 mg, 325 mg, Oral, Q48H, Silver Khadra, DO, 325 mg at 11/12/17 0743    sodium chloride flush 0.9 % injection 10 mL, 10 mL, Intravenous, 2 times per day, Marium Khadra, DO, 10 mL at 11/14/17 1046    sodium chloride flush 0.9 % injection 10 mL, 10 mL, Intravenous, PRN, Silver Khadra, DO    potassium chloride (KLOR-CON M) extended release tablet 40 mEq, 40 mEq, Oral, PRN **OR** potassium chloride 20 MEQ/15ML (10%) oral solution 40 mEq, 40 mEq, Oral, PRN **OR** potassium chloride 10 mEq/100 mL IVPB (Peripheral Line), 10 mEq, Intravenous, PRN, Marium Khadra, DO    acetaminophen (TYLENOL) tablet 650 mg, 650 mg, Oral, Q4H PRN, Silver Khadra, DO    magnesium hydroxide (MILK OF MAGNESIA) 400 MG/5ML suspension 30 mL, 30 mL, Oral, Daily PRN, Marium Khadra, DO    ondansetron [facility]) injection 4 mg, 4 mg, Intravenous, Q6H PRN, Silver Khadra, DO  Prior to Admission medications    Medication Sig Start Date End Date Taking? Authorizing Provider   atenolol (TENORMIN) 25 MG tablet Take 25 mg by mouth daily. Yes Historical Provider, MD   levothyroxine (SYNTHROID) 100 MCG tablet Take 150 mcg by mouth Daily    Yes Historical Provider, MD   pravastatin (PRAVACHOL) 40 MG tablet Take 40 mg by mouth daily.    Yes Historical Provider, MD   Multiple Vitamins-Minerals (MENS MULTI VITAMIN & MINERAL PO) Take by mouth    Historical Provider, MD   aspirin 325 MG tablet Take 325 mg by mouth every 48 hours     Historical Provider, MD     Additional information:       PHYSICAL:   Heart:  [x]Regular rate and rhythm  []Other:    Lungs:  [x]Clear    []Other:    Abdomen: [x]Soft    []Other:    Mental Status: [x]Alert & Oriented  []Other:      VITAL SIGNS   Patient Vitals for the past 24 hrs:   BP Temp Temp src Pulse Resp SpO2   11/14/17 1814 (!) 82/50 - - 72 12 100 %   11/14/17 1812 (!) 87/52 - - 72 13 100 %   11/14/17 1809 (!) 82/51 - - 76 15 100 % 11/14/17 1806 (!) 89/52 - - 74 13 100 %   11/14/17 1803 (!) 81/49 - - 74 20 100 %   11/14/17 1800 (!) 101/57 - - 77 15 100 %   11/14/17 1757 (!) 92/52 - - 85 16 100 %   11/14/17 1754 (!) 107/53 - - 73 15 100 %   11/14/17 1751 (!) 97/55 - - 75 - 99 %   11/14/17 1714 116/70 - - 76 18 100 %   11/14/17 1541 102/63 98.6 °F (37 °C) Oral 77 16 100 %   11/14/17 1030 108/71 98.4 °F (36.9 °C) Oral 73 - 100 %   11/14/17 0515 106/66 98.7 °F (37.1 °C) Oral 77 16 99 %   11/13/17 2130 110/68 98.5 °F (36.9 °C) Oral 81 18 100 %       PLANNED PROCEDURE   [x]EGD  [x]Colonoscopy []Flex Sigmoid     Consent: I have discussed with the patient and/or the patient representative the indication, alternatives, and the possible risks and/or complications of the planned procedure and the anesthesia methods. The patient and/or patient representative appear to understand and agree to proceed. SEDATION  Planned agent:[x]Midazolam []Meperidine [x]Sublimaze []Morphine  []Diazepam  []Propofol   ASA Classification: Class 3 - A patient with severe systemic disease that limits activity but is not incapacitating    Monitoring and Safety: The patient will be placed on a cardiac monitor and vital signs, pulse oximetry and level of consciousness will be continuously evaluated throughout the procedure. The patient will be closely monitored until recovery from the medications is complete and the patient has returned to baseline status. Respiratory therapy will be on standby during the procedure. [x]Pre-procedure diagnostic studies complete and results available. Comment:    [x]Previous sedation/anesthesia experiences assessed. Comment:    [x]The patient is an appropriate candidate to undergo the planned procedure sedation and anesthesia. (Refer to nursing sedation/analgesia documentation record)  [x]Formulation and discussion of sedation/procedure plan, risks, and expectations with patient and/or responsible adult completed.   [x]Patient examined immediately prior to the procedure.  (Refer to nursing sedation/analgesia documentation record)    Loida Sheffield MD   Electronically signed 2017

## 2017-11-15 LAB
HCT VFR BLD CALC: 22.6 % (ref 42–52)
HEMOGLOBIN: 7.2 GM/DL (ref 14–18)
POTASSIUM SERPL-SCNC: 4.3 MEQ/L (ref 3.5–5.2)

## 2017-11-15 PROCEDURE — 85014 HEMATOCRIT: CPT

## 2017-11-15 PROCEDURE — 84132 ASSAY OF SERUM POTASSIUM: CPT

## 2017-11-15 PROCEDURE — 6370000000 HC RX 637 (ALT 250 FOR IP): Performed by: NURSE PRACTITIONER

## 2017-11-15 PROCEDURE — A4421 OSTOMY SUPPLY MISC: HCPCS

## 2017-11-15 PROCEDURE — 2580000003 HC RX 258: Performed by: INTERNAL MEDICINE

## 2017-11-15 PROCEDURE — 6370000000 HC RX 637 (ALT 250 FOR IP): Performed by: INTERNAL MEDICINE

## 2017-11-15 PROCEDURE — 36415 COLL VENOUS BLD VENIPUNCTURE: CPT

## 2017-11-15 PROCEDURE — 2580000003 HC RX 258: Performed by: FAMILY MEDICINE

## 2017-11-15 PROCEDURE — 85018 HEMOGLOBIN: CPT

## 2017-11-15 PROCEDURE — 1200000000 HC SEMI PRIVATE

## 2017-11-15 PROCEDURE — 99232 SBSQ HOSP IP/OBS MODERATE 35: CPT | Performed by: FAMILY MEDICINE

## 2017-11-15 PROCEDURE — 6370000000 HC RX 637 (ALT 250 FOR IP): Performed by: FAMILY MEDICINE

## 2017-11-15 RX ORDER — SODIUM CHLORIDE 9 MG/ML
INJECTION, SOLUTION INTRAVENOUS CONTINUOUS
Status: DISCONTINUED | OUTPATIENT
Start: 2017-11-15 | End: 2017-11-16 | Stop reason: HOSPADM

## 2017-11-15 RX ADMIN — BICALUTAMIDE 50 MG: 50 TABLET, FILM COATED ORAL at 08:36

## 2017-11-15 RX ADMIN — Medication 10 ML: at 08:36

## 2017-11-15 RX ADMIN — CIPROFLOXACIN 500 MG: 500 TABLET, FILM COATED ORAL at 21:31

## 2017-11-15 RX ADMIN — CIPROFLOXACIN 500 MG: 500 TABLET, FILM COATED ORAL at 08:36

## 2017-11-15 RX ADMIN — SODIUM CHLORIDE: 9 INJECTION, SOLUTION INTRAVENOUS at 11:44

## 2017-11-15 RX ADMIN — LEVOTHYROXINE SODIUM 150 MCG: 150 TABLET ORAL at 07:07

## 2017-11-15 RX ADMIN — MULTIPLE VITAMINS W/ MINERALS TAB 1 TABLET: TAB at 11:44

## 2017-11-15 RX ADMIN — PRAVASTATIN SODIUM 40 MG: 40 TABLET ORAL at 22:38

## 2017-11-15 NOTE — PLAN OF CARE
Problem: Risk for Impaired Skin Integrity  Goal: Tissue integrity - skin and mucous membranes  Structural intactness and normal physiological function of skin and  mucous membranes. Outcome: Ongoing  Absent of any new skin breakdown issues. Skin care provided. Complete bath and linen changed tonight with minimal assistance. Problem: Falls - Risk of  Goal: Absence of falls  Outcome: Ongoing  Patient absent of falls this shift, falling star program in place. Problem: Discharge Planning:  Goal: Discharged to appropriate level of care  Discharged to appropriate level of care   Outcome: Ongoing  No plans for discharge at this time. Problem: Nutrition Deficit:  Goal: Ability to achieve adequate nutritional intake will improve  Ability to achieve adequate nutritional intake will improve   Outcome: Ongoing  After patient was more alert and awake late this evening after procedures he ate hot TV tray meal, turkey sandwich and ice cream.  Ensure was also provided. Comments: Care plan reviewed with patient. Patient verbalize understanding of the plan of care and contribute to goal setting.

## 2017-11-15 NOTE — PLAN OF CARE
Problem: Risk for Impaired Skin Integrity  Goal: Tissue integrity - skin and mucous membranes  Structural intactness and normal physiological function of skin and  mucous membranes. Outcome: Ongoing  No new skin issues noted. Patient has stage II on coccyx. EPC cream applied. No new skin issues. Problem: Falls - Risk of  Goal: Absence of falls  Outcome: Ongoing  Falling star program in place. Patient up with assist. Bed alarm set for safety. Problem: Discharge Planning:  Goal: Discharged to appropriate level of care  Discharged to appropriate level of care   Outcome: Ongoing  Patient plans home alone when discharged. Problem: Nutrition  Goal: Optimal nutrition therapy  Outcome: Ongoing  Patient eating well this shift. Will continue to encourage PO intake. Comments: Care plan reviewed with patient. Patient verbalize understanding of the plan of care and contribute to goal setting.

## 2017-11-15 NOTE — PROGRESS NOTES
consulted  8. Weight loss -- likely cancer related - c/s GI however with anemia, poor po, wt loss. -- TSH elevated thus less likely contributing  9. Hypokalemia -- likely nutritional - replace and monitor - Mg normal 11/10/17  10. Elevated alk Phos -- likely bone related with bone mets per CT abd/bone scan -- rest of LFT WNL  11. Essential HTN -- BP low 11/15 --> stopped atenolol - monitor and adjust prn  12. Hypothyroidism -- TSH up 11/11/17 -- synthroid cont -- ?compliant -- ?need to increase dose - f/u pcp  13. Thrombocytosis -- likely due to anemia - iron studies 11/11 wNL  14. HLP --statin  15. Hx CKD stage 1 -- follows with dr Glil Jerome however renal fxn is normal - monitor  16. OA  17. Hx colon polyp 2014 and again polyp found 11/14/17 with colonoscopy - removed and bx (p)    Dispo  -- 11/15 --> BP low this am - add IVF and monitor h/h - stop atenolol - d/w  to look into pt being able to get casodex per urology recs as pt follows with the [address] monitor h/h, lytes, BP, renal fxn - increase activity and likely d/c home tomorrow if stable. Pt has refused any assistance from . -- 11/14 --> apprec consultants -- EGD/colonoscopy this pm - cont monitor h/h, lytes, BP -- await further urology, oncology recs     -- 11/13 --> apprec urology assist - consulted oncology and await recs -- will c/s GI to r/o GI etiology of wt loss, metastatic dz -- bone scan (p) -- casodex and firmagon started per urology -- start cipro for abn u/a and + sx  -- cont monitor h/h, BP, lytes, renal fxn       Chief Complaint: weight loss    Hospital Course: Janet SarabiaPatricia is a 78 y.o. male with hx prostate cancer, HLP, HTN, OA, goiter/hypothyroidism presenting with weight loss and found to be anemic with hgb 6.5 - pt admitted for further eval and tx.  -- urology consulted for bladder lesion  -- transfused 1 unit PRBC on admission.   -- oncology consulted by urology for abn CT abd with probable bone mets -- use.  Cardiovascular: Regular rate and rhythm with normal S1/S2 without murmurs, rubs or gallops. Abdomen: Soft, +diffuse TTP, non-distended with normal bowel sounds. No rebound or guarding  Musculoskeletal: +trace edema bilaterally L>R. Full range of motion without deformity. No calf tenderness palpation  Skin: Skin dry. No rashes or lesions. Neurologic:  Neurovascularly intact without any focal sensory/motor deficits. Cranial nerves: II-XII intact, grossly non-focal.  Psychiatric: Alert and oriented, thought content appropriate, normal insight  Capillary Refill: Brisk,< 3 seconds   Peripheral Pulses: +2 palpable, equal bilaterally       Labs:   Recent Labs      11/13/17   1200   WBC  5.3   HGB  8.1*   HCT  25.3*   PLT  492*     Recent Labs      11/13/17   0839   NA  137   K  4.0   CL  98   CO2  25   BUN  14   CREATININE  0.5   CALCIUM  8.3*     No results for input(s): AST, ALT, BILIDIR, BILITOT, ALKPHOS in the last 72 hours. No results for input(s): INR in the last 72 hours. No results for input(s): David Reeves in the last 72 hours. Urinalysis:    Lab Results   Component Value Date    NITRU POSITIVE 11/12/2017    WBCUA 50-75 11/12/2017    BACTERIA MANY 11/12/2017    RBCUA 0-2 11/12/2017    BLOODU NEGATIVE 11/12/2017    SPECGRAV 1.018 06/09/2014    GLUCOSEU NEGATIVE 11/12/2017       Radiology:  NM BONE SCAN WHOLE BODY   Final Result   Areas of increased activity throughout the axial and appendicular skeleton consistent with osseous metastatic disease. **This report has been created using voice recognition software. It may contain minor errors which are inherent in voice recognition technology. **      Final report electronically signed by Dr. Shazia Senior on 11/13/2017 3:16 PM          Diet: Dietary Nutrition Supplements: Standard High Calorie Oral Supplement  Dietary Nutrition Supplements: Frozen Oral Supplement  DIET GENERAL;    [place]:  none    Microbiology:  Urine cx 11/12 =

## 2017-11-15 NOTE — CARE COORDINATION
11/15/17  12:09 PM  Spoke with Dr Nicholas Joya nurse Paramjit Saez RN at [facility], who helped arrange discharge plans for [place]. [place] was in agreement of plans. Casodex 50mg 30 day supply will be mailed to [facility] home. The order for a \"Non VA Consult\" was placed by the PA at the [facility] per Paramjit Saez RN for Urology and Oncology in [place] for follow up. An appt with DR Abram Mckeon was arranged, the soonest available, November 27, 2017 Monday at 11:00. No other follow up appts should be made at this time, except Dr Sue Ag at the [facility], if an appt is done before the contract is signed for the non VA consult,  coverage will not be granted. The [facility] will arrange the follow up with [place] Urology and Oncologist, they are aware Dr Dennis Her saw patient here.

## 2017-11-16 ENCOUNTER — TELEPHONE (OUTPATIENT)
Dept: UROLOGY | Age: 67
End: 2017-11-16

## 2017-11-16 VITALS
BODY MASS INDEX: 17.86 KG/M2 | WEIGHT: 139.2 LBS | TEMPERATURE: 98.1 F | HEIGHT: 74 IN | RESPIRATION RATE: 16 BRPM | SYSTOLIC BLOOD PRESSURE: 107 MMHG | DIASTOLIC BLOOD PRESSURE: 72 MMHG | HEART RATE: 71 BPM | OXYGEN SATURATION: 100 %

## 2017-11-16 LAB
ANION GAP SERPL CALCULATED.3IONS-SCNC: 11 MEQ/L (ref 8–16)
ANISOCYTOSIS: ABNORMAL
BASOPHILS # BLD: 0.5 %
BASOPHILS ABSOLUTE: 0 THOU/MM3 (ref 0–0.1)
BUN BLDV-MCNC: 11 MG/DL (ref 7–22)
CALCIUM SERPL-MCNC: 7.7 MG/DL (ref 8.5–10.5)
CHLORIDE BLD-SCNC: 100 MEQ/L (ref 98–111)
CO2: 26 MEQ/L (ref 23–33)
CREAT SERPL-MCNC: 0.5 MG/DL (ref 0.4–1.2)
EOSINOPHIL # BLD: 0.3 %
EOSINOPHILS ABSOLUTE: 0 THOU/MM3 (ref 0–0.4)
GFR SERPL CREATININE-BSD FRML MDRD: > 90 ML/MIN/1.73M2
GLUCOSE BLD-MCNC: 87 MG/DL (ref 70–108)
HCT VFR BLD CALC: 22.6 % (ref 42–52)
HEMOGLOBIN: 7.1 GM/DL (ref 14–18)
LYMPHOCYTES # BLD: 23.4 %
LYMPHOCYTES ABSOLUTE: 1 THOU/MM3 (ref 1–4.8)
MCH RBC QN AUTO: 28.3 PG (ref 27–31)
MCHC RBC AUTO-ENTMCNC: 31.6 GM/DL (ref 33–37)
MCV RBC AUTO: 89.8 FL (ref 80–94)
MONOCYTES # BLD: 7.3 %
MONOCYTES ABSOLUTE: 0.3 THOU/MM3 (ref 0.4–1.3)
NUCLEATED RED BLOOD CELLS: 0 /100 WBC
PDW BLD-RTO: 20.8 % (ref 11.5–14.5)
PLATELET # BLD: 302 THOU/MM3 (ref 130–400)
PMV BLD AUTO: 6.5 MCM (ref 7.4–10.4)
POTASSIUM SERPL-SCNC: 4.3 MEQ/L (ref 3.5–5.2)
RBC # BLD: 2.52 MILL/MM3 (ref 4.7–6.1)
SEG NEUTROPHILS: 68.5 %
SEGMENTED NEUTROPHILS ABSOLUTE COUNT: 2.8 THOU/MM3 (ref 1.8–7.7)
SODIUM BLD-SCNC: 137 MEQ/L (ref 135–145)
WBC # BLD: 4.1 THOU/MM3 (ref 4.8–10.8)

## 2017-11-16 PROCEDURE — 80048 BASIC METABOLIC PNL TOTAL CA: CPT

## 2017-11-16 PROCEDURE — 6370000000 HC RX 637 (ALT 250 FOR IP): Performed by: FAMILY MEDICINE

## 2017-11-16 PROCEDURE — 36415 COLL VENOUS BLD VENIPUNCTURE: CPT

## 2017-11-16 PROCEDURE — 2580000003 HC RX 258: Performed by: FAMILY MEDICINE

## 2017-11-16 PROCEDURE — 6370000000 HC RX 637 (ALT 250 FOR IP): Performed by: INTERNAL MEDICINE

## 2017-11-16 PROCEDURE — 85025 COMPLETE CBC W/AUTO DIFF WBC: CPT

## 2017-11-16 PROCEDURE — 99239 HOSP IP/OBS DSCHRG MGMT >30: CPT | Performed by: FAMILY MEDICINE

## 2017-11-16 PROCEDURE — 6370000000 HC RX 637 (ALT 250 FOR IP): Performed by: NURSE PRACTITIONER

## 2017-11-16 RX ORDER — ACETAMINOPHEN 325 MG/1
650 TABLET ORAL EVERY 4 HOURS PRN
Qty: 120 TABLET | Refills: 3 | COMMUNITY
Start: 2017-11-16

## 2017-11-16 RX ORDER — CIPROFLOXACIN 500 MG/1
500 TABLET, FILM COATED ORAL EVERY 12 HOURS SCHEDULED
Qty: 10 TABLET | Refills: 0 | Status: SHIPPED | OUTPATIENT
Start: 2017-11-16 | End: 2017-11-21

## 2017-11-16 RX ORDER — BICALUTAMIDE 50 MG/1
50 TABLET, FILM COATED ORAL DAILY
Qty: 90 TABLET | Refills: 3
Start: 2017-11-17 | End: 2017-11-22 | Stop reason: SDUPTHER

## 2017-11-16 RX ADMIN — BICALUTAMIDE 50 MG: 50 TABLET, FILM COATED ORAL at 08:09

## 2017-11-16 RX ADMIN — LEVOTHYROXINE SODIUM 150 MCG: 150 TABLET ORAL at 06:25

## 2017-11-16 RX ADMIN — MULTIPLE VITAMINS W/ MINERALS TAB 1 TABLET: TAB at 11:35

## 2017-11-16 RX ADMIN — SODIUM CHLORIDE: 9 INJECTION, SOLUTION INTRAVENOUS at 00:44

## 2017-11-16 RX ADMIN — CIPROFLOXACIN 500 MG: 500 TABLET, FILM COATED ORAL at 08:09

## 2017-11-16 RX ADMIN — ASPIRIN 325 MG: 325 TABLET, COATED ORAL at 08:09

## 2017-11-16 ASSESSMENT — PAIN SCALES - GENERAL: PAINLEVEL_OUTOF10: 0

## 2017-11-16 NOTE — TELEPHONE ENCOUNTER
[facility] called and requested a week f/u for pt being discharged today dx: cystoscopy for bladder wall thickening and possible mass psa beinf done on 12/13/17 please call pt.

## 2017-11-16 NOTE — TELEPHONE ENCOUNTER
Received word today from Raphael Og at the [facility] that Mr. Maciej Bazan is approved and she is faxing the approval today.

## 2017-11-16 NOTE — DISCHARGE SUMMARY
metastatic dz   -- oncology consulted per urology --> per c/s stated pt followed with [facility] oncology but pt states he does NOT follow with oncology through the [facility] thus await further oncology recs  -- per urology pt failed f/u in 2015 when PSA was rising again - per urology note \"it appears he received one injection of Debera Cloud in the office, and called in and reported he could not afford the medication. Our office made several attempts to contact and get assistance for patient, but he has not followed up since 2015. \"  -- started firmagon and started casodex 11/12 --> urology to assist in getting approval from [facility] for f/u with urology office  23. Retroperitoneal lymphadenopathy -- ?due to ? Recurrence/mets with prostate cancer -- c/s GI to r/o GI malignancy causing wt loss and lymphadenopathy and EGD/colonoscopy 11/14/17 with (-) EGD and colonoscopy with sessile polyp ascending colon removed   24. ++ bone mets -- noted per CT abd 11/10/17 and bone scan 11/13/17 with multiple areas of metastatic dz  -- urology consulted with above and oncology consulted per urology - await recs   25. Severe malnutrition -- dieticians consulted  26. Weight loss -- likely cancer related - c/s GI however with anemia, poor po, wt loss. -- TSH elevated thus less likely contributing  27. Hypokalemia -- likely nutritional - replace and monitor - Mg normal 11/10/17  28. Elevated alk Phos -- likely bone related with bone mets per CT abd/bone scan -- rest of LFT WNL  29. Essential HTN -- BP low 11/15 --> stopped atenolol - monitor and adjust prn  30. Hypothyroidism -- TSH up 11/11/17 -- synthroid cont -- ?compliant -- ?need to increase dose - f/u pcp  31. Thrombocytosis -- likely due to anemia - iron studies 11/11 wNL  32. HLP --statin  33. Hx CKD stage 1 -- follows with dr Gerard Benoit however renal fxn is normal - monitor  34. OA  35. Hx colon polyp 2014 and again polyp found 11/14/17 with colonoscopy - removed and bx (p)    [place] was HD stable.   H/h low but stable. D/w  Seda Veliz about making sure pt gets his casodex and f/u as looks as though he has been lost to f/u per urology notes. Per case mgmt note 11/15/17 \"Spoke with Dr Poncho Laureano nurse Carmen العراقي RN at [facility], who helped arrange discharge plans for [place]. [place] was in agreement of plans. Casodex 50mg 30 day supply will be mailed to [facility] home. The order for a \"Non VA Consult\" was placed by the PA at the [facility] per Carmen العراقي RN for Urology and Oncology in [address] [place] [address] for follow up. An appt with DR Chente Fine was arranged, the soonest available, November 27, 2017 Monday at 11:00. No other follow up appts should be made at this time, except Dr Harley Mesa at the [facility], if an appt is done before the contract is signed for the non VA consult,  coverage will not be granted. The VA will arrange the follow up with [address] [place] [address] Urology and Oncologist, they are aware Dr Joselin Welsh saw patient here. \"    Offered [place] assistance again for f/u appts, home care, etc and he has refused all. Explained importance of f/u.  ?if he truly understands his disease process. He will f/u with PCP and then further appts from there. He was d/c home in stable condition. Discharge Medications:   Camie Osler Medication Instructions SARAH:[id number]    Printed on:11/16/17 5086   Medication Information                      acetaminophen (TYLENOL) 325 MG tablet  Take 2 tablets by mouth every 4 hours as needed for Pain             aspirin 325 MG tablet  Take 325 mg by mouth every 48 hours              bicalutamide (CASODEX) 50 MG chemo tablet  Take 1 tablet by mouth daily             ciprofloxacin (CIPRO) 500 MG tablet  Take 1 tablet by mouth every 12 hours for 5 days             levothyroxine (SYNTHROID) 100 MCG tablet  Take 150 mcg by mouth Daily              Multiple Vitamins-Minerals (MENS MULTI VITAMIN & MINERAL PO)  Take by mouth             pravastatin (PRAVACHOL) 40 MG tablet  Take 40 mg by mouth daily. Patient Instructions:    Discharge lab work: per [facility]  Activity: activity as tolerated  Diet: Dietary Nutrition Supplements: Standard High Calorie Oral Supplement  Dietary Nutrition Supplements: Frozen Oral Supplement  DIET GENERAL;    Code Status: Full Code    Follow-up visits:   Dino Jones DO  [address] [place] [address]  [phone]    On 11/27/2017  [facility] at 11:00     David Burgos, [address] [facility]  [address] [place] [address] Road [phone]    In 1 week  Office cystoscopy for bladder wall thickening and possible mass. (PSA around 12/13/17)       Procedures:   -- EGD 11/14/17 = Normal upper endoscopy, with no endoscopic evidence of neoplasia or mucosal abnormality.     -- Colonoscopy 11/14/17 =  hemorrhoids internal, Moderate in size  polyp(s) #1, 5 mm in size, located in the ascending colon removed by cold biopsy and sent for pathology    Consults:   IP CONSULT TO UROLOGY  IP CONSULT TO HEM/ONC  IP CONSULT TO GI      Examination:  Vitals:  Vitals:    11/15/17 1518 11/15/17 2015 11/16/17 0015 11/16/17 0800   BP: 100/68 98/70 102/74 107/72   Pulse: 82 74 78 71   Resp: 16 16 16 16   Temp: 99 °F (37.2 °C) 98.4 °F (36.9 °C) 98.5 °F (36.9 °C) 98.1 °F (36.7 °C)   TempSrc: Oral Oral Oral Oral   SpO2: 98% 100% 100% 100%   Weight:       Height:         Weight: Weight: 139 lb 3.2 oz (63.1 kg)     24 hour intake/output:  Intake/Output Summary (Last 24 hours) at 11/16/17 1426  Last data filed at 11/16/17 0853   Gross per 24 hour   Intake             2415 ml   Output              950 ml   Net             1465 ml       General appearance - oriented to person, place, and time and chronically ill appearing  Chest - clear to auscultation, no wheezes, rales or rhonchi, symmetric air entry, no tachypnea, retractions or cyanosis  Heart - normal rate, regular rhythm, normal S1, S2, no murmurs, rubs, clicks or gallops  Abdomen - soft, nontender, nondistended, no masses or organomegaly  bowel sounds normal  Obese: No; Protuberant: No   Neurological - alert, oriented, normal speech, no focal findings or movement disorder noted, cranial nerves II through XII intact  Extremities - peripheral pulses normal, no pedal edema, no clubbing or cyanosis  Skin - normal coloration and turgor, no rashes, no suspicious skin lesions noted    Significant Diagnostics:   Radiology:   NM BONE SCAN WHOLE BODY   Final Result   Areas of increased activity throughout the axial and appendicular skeleton consistent with osseous metastatic disease. **This report has been created using voice recognition software. It may contain minor errors which are inherent in voice recognition technology. **      Final report electronically signed by Dr. Khoa Conway on 11/13/2017 3:16 PM          Labs:   Recent Results (from the past 72 hour(s))   Blood occult stool screen #1    Collection Time: 11/13/17  6:15 PM   Result Value Ref Range    OCCULT BLOOD FECAL Negative    Hemoglobin and Hematocrit, Blood    Collection Time: 11/15/17 11:31 AM   Result Value Ref Range    Hemoglobin 7.2 (L) 14.0 - 18.0 gm/dl    Hematocrit 22.6 (L) 42.0 - 52.0 %   Potassium    Collection Time: 11/15/17 11:31 AM   Result Value Ref Range    Potassium 4.3 3.5 - 5.2 meq/L   Basic Metabolic Panel    Collection Time: 11/16/17  6:20 AM   Result Value Ref Range    Sodium 137 135 - 145 meq/L    Potassium 4.3 3.5 - 5.2 meq/L    Chloride 100 98 - 111 meq/L    CO2 26 23 - 33 meq/L    Glucose 87 70 - 108 mg/dL    BUN 11 7 - 22 mg/dL    CREATININE 0.5 0.4 - 1.2 mg/dL    Calcium 7.7 (L) 8.5 - 10.5 mg/dL   CBC Auto Differential    Collection Time: 11/16/17  6:20 AM   Result Value Ref Range    WBC 4.1 (L) 4.8 - 10.8 thou/mm3    RBC 2.52 (L) 4.70 - 6.10 mill/mm3    Hemoglobin 7.1 (L) 14.0 - 18.0 gm/dl    Hematocrit 22.6 (L) 42.0 - 52.0 %    MCV 89.8 80.0 - 94.0 fL    MCH 28.3 27.0 - 31.0 pg    MCHC 31.6 (L) 33.0 - 37.0 gm/dl    RDW 20.8 (H) 11.5 - 14.5 % Platelets 553 528 - 156 thou/mm3    MPV 6.5 (L) 7.4 - 10.4 mcm    Seg Neutrophils 68.5 %    Lymphocytes 23.4 %    Monocytes 7.3 %    Eosinophils 0.3 %    Basophils 0.5 %    nRBC 0 /100 wbc    Anisocytosis 1+ Absent    Segs Absolute 2.8 1.8 - 7.7 thou/mm3    Lymphocytes # 1.0 1.0 - 4.8 thou/mm3    Monocytes # 0.3 (L) 0.4 - 1.3 thou/mm3    Eosinophils # 0.0 0.0 - 0.4 thou/mm3    Basophils # 0.0 0.0 - 0.1 thou/mm3   Anion Gap    Collection Time: 11/16/17  6:20 AM   Result Value Ref Range    Anion Gap 11.0 8.0 - 16.0 meq/L   Glomerular Filtration Rate, Estimated    Collection Time: 11/16/17  6:20 AM   Result Value Ref Range    Est, Glom Filt Rate >90 ml/min/1.73m2       Discharge condition: fair  Disposition: Home  Time spent on discharge: 40 min    Electronically signed by ABRIL FISCHER MD on 11/16/2017 at 2:26 PM

## 2017-11-16 NOTE — CARE COORDINATION
11/16/17, 2:51 PM    Discharge plan discussed by  and . Discharge plan reviewed with patient/ family. Patient/ family verbalize understanding of discharge plan and are in agreement with plan. Understanding was demonstrated using the teach back method.        IMM Letter  IMM Letter given to Patient/Family/Significant other/Guardian/POA/by[de-identified]   IMM Letter date given[de-identified] 11/16/17  IMM Letter time given[de-identified] 7853

## 2017-11-16 NOTE — PLAN OF CARE
Problem: Risk for Impaired Skin Integrity  Goal: Tissue integrity - skin and mucous membranes  Structural intactness and normal physiological function of skin and  mucous membranes. No new skin issues. Problem: Falls - Risk of  Goal: Absence of falls  Outcome: Ongoing  Bed alarm on, patient up with assistance    Problem: Discharge Planning:  Goal: Discharged to appropriate level of care  Discharged to appropriate level of care   Outcome: Ongoing  Returning home alone upon discharge. Problem: Nutrition  Goal: Optimal nutrition therapy  Outcome: Ongoing  Appetite adequate. Comments: Care plan discussed with patient. Patient verbalized understanding.

## 2017-11-17 ENCOUNTER — TELEPHONE (OUTPATIENT)
Dept: UROLOGY | Age: 67
End: 2017-11-17

## 2017-11-17 NOTE — TELEPHONE ENCOUNTER
Fawad Gamez, patient's neighbor called. She takes him to appointments and helps him out. She stated that on his discharge papers it states to ask his doctor how to get the Casodex. She also stated that he was supposed to start on it this morning. Pharmacy is [address] [place] [address] on [employer] and she checked there and they don't have a prescription. She's also not sure which doctor is supposed to be prescribing it. Patient does not have a phone right now because he needs a new battery. He did give me verbal permission to speak with her. Please advise.

## 2017-11-19 PROBLEM — D63.8 ANEMIA OF CHRONIC DISEASE: Status: ACTIVE | Noted: 2017-11-11

## 2017-11-20 ENCOUNTER — PROCEDURE VISIT (OUTPATIENT)
Dept: UROLOGY | Age: 67
End: 2017-11-20
Payer: MEDICARE

## 2017-11-20 VITALS
HEIGHT: 72 IN | DIASTOLIC BLOOD PRESSURE: 70 MMHG | BODY MASS INDEX: 21.81 KG/M2 | SYSTOLIC BLOOD PRESSURE: 110 MMHG | WEIGHT: 161 LBS

## 2017-11-20 DIAGNOSIS — N32.89 BLADDER WALL THICKENING: ICD-10-CM

## 2017-11-20 DIAGNOSIS — C61 PROSTATE CA (HCC): Primary | ICD-10-CM

## 2017-11-20 LAB
BILIRUBIN URINE: NEGATIVE
BLOOD URINE, POC: NEGATIVE
CHARACTER, URINE: CLEAR
COLOR, URINE: YELLOW
GLUCOSE URINE: NEGATIVE MG/DL
KETONES, URINE: NORMAL
LEUKOCYTE CLUMPS, URINE: NEGATIVE
NITRITE, URINE: NEGATIVE
PH, URINE: 5
PROTEIN, URINE: NEGATIVE MG/DL
SPECIFIC GRAVITY, URINE: >= 1.03 (ref 1–1.03)
UROBILINOGEN, URINE: 0.2 EU/DL

## 2017-11-20 PROCEDURE — 81003 URINALYSIS AUTO W/O SCOPE: CPT | Performed by: UROLOGY

## 2017-11-20 PROCEDURE — 52000 CYSTOURETHROSCOPY: CPT | Performed by: UROLOGY

## 2017-11-20 PROCEDURE — 99999 PR OFFICE/OUTPT VISIT,PROCEDURE ONLY: CPT | Performed by: UROLOGY

## 2017-11-21 ENCOUNTER — HOSPITAL ENCOUNTER (OUTPATIENT)
Age: 67
Discharge: HOME | End: 2017-11-21
Payer: MEDICARE

## 2017-11-21 ENCOUNTER — OFFICE VISIT (OUTPATIENT)
Dept: NEPHROLOGY | Age: 67
End: 2017-11-21
Payer: MEDICARE

## 2017-11-21 VITALS
BODY MASS INDEX: 22.65 KG/M2 | SYSTOLIC BLOOD PRESSURE: 114 MMHG | DIASTOLIC BLOOD PRESSURE: 60 MMHG | WEIGHT: 167 LBS | OXYGEN SATURATION: 98 % | HEART RATE: 88 BPM

## 2017-11-21 DIAGNOSIS — C61 PROSTATE CA (HCC): ICD-10-CM

## 2017-11-21 DIAGNOSIS — C79.51 PROSTATE CANCER METASTATIC TO BONE (HCC): ICD-10-CM

## 2017-11-21 DIAGNOSIS — C79.51 CANCER, METASTATIC TO BONE (HCC): Primary | ICD-10-CM

## 2017-11-21 DIAGNOSIS — C61 PROSTATE CANCER METASTATIC TO BONE (HCC): ICD-10-CM

## 2017-11-21 LAB — PROSTATE SPECIFIC ANTIGEN: 583 NG/ML (ref 0–1)

## 2017-11-21 PROCEDURE — 84153 ASSAY OF PSA TOTAL: CPT

## 2017-11-21 PROCEDURE — 4040F PNEUMOC VAC/ADMIN/RCVD: CPT | Performed by: INTERNAL MEDICINE

## 2017-11-21 PROCEDURE — G8484 FLU IMMUNIZE NO ADMIN: HCPCS | Performed by: INTERNAL MEDICINE

## 2017-11-21 PROCEDURE — 1036F TOBACCO NON-USER: CPT | Performed by: INTERNAL MEDICINE

## 2017-11-21 PROCEDURE — 3017F COLORECTAL CA SCREEN DOC REV: CPT | Performed by: INTERNAL MEDICINE

## 2017-11-21 PROCEDURE — 1123F ACP DISCUSS/DSCN MKR DOCD: CPT | Performed by: INTERNAL MEDICINE

## 2017-11-21 PROCEDURE — 99213 OFFICE O/P EST LOW 20 MIN: CPT | Performed by: INTERNAL MEDICINE

## 2017-11-21 PROCEDURE — G8427 DOCREV CUR MEDS BY ELIG CLIN: HCPCS | Performed by: INTERNAL MEDICINE

## 2017-11-21 PROCEDURE — 36415 COLL VENOUS BLD VENIPUNCTURE: CPT

## 2017-11-21 PROCEDURE — 1111F DSCHRG MED/CURRENT MED MERGE: CPT | Performed by: INTERNAL MEDICINE

## 2017-11-21 PROCEDURE — G8420 CALC BMI NORM PARAMETERS: HCPCS | Performed by: INTERNAL MEDICINE

## 2017-11-21 ASSESSMENT — ENCOUNTER SYMPTOMS: RESPIRATORY NEGATIVE: 1

## 2017-11-21 NOTE — PROGRESS NOTES
Visit Date: 11/21/2017      HPI:     Nae Leyva is a 78 y.o. male who presents today for:  Chief Complaint   Patient presents with    Follow-Up from [place] [id number]    Chronic Kidney Disease     Stage II       Current Outpatient Prescriptions   Medication Sig Dispense Refill    acetaminophen (TYLENOL) 325 MG tablet Take 2 tablets by mouth every 4 hours as needed for Pain 120 tablet 3    bicalutamide (CASODEX) 50 MG chemo tablet Take 1 tablet by mouth daily 90 tablet 3    ciprofloxacin (CIPRO) 500 MG tablet Take 1 tablet by mouth every 12 hours for 5 days 10 tablet 0    Multiple Vitamins-Minerals (MENS MULTI VITAMIN & MINERAL PO) Take by mouth      levothyroxine (SYNTHROID) 100 MCG tablet Take 150 mcg by mouth Daily       aspirin 325 MG tablet Take 325 mg by mouth every 48 hours       pravastatin (PRAVACHOL) 40 MG tablet Take 40 mg by mouth daily. No current facility-administered medications for this visit.       No Known Allergies    Past Medical History:   Diagnosis Date    Backache     Cancer [facility]) 2011    prostate    Goiter     Hyperlipidemia     Hypertension     Hypothyroidism     Incontinence of urine 2/28/2012    Osteoarthritis       Past Surgical History:   Procedure Laterality Date    COLONOSCOPY      [place] COLSC FLX W/RMVL OF TUMOR POLYP LESION SNARE TQ N/A 11/14/2017    COLONOSCOPY POLYPECTOMY SNARE/COLD BIOPSY performed by Amber Butterfield MD at [facility]    [place] ESOPHAGOGASTRODUODENOSCOPY TRANSORAL DIAGNOSTIC N/A 11/14/2017    EGD ESOPHAGOGASTRODUODENOSCOPY performed by Amber Butterfield MD at [address] [place] [address]  2011    Dr. Sarahy Bell     Family History   Problem Relation Age of Onset    High Blood Pressure Mother     Diabetes Mother     Cancer Father      throat     Social History   Substance Use Topics    Smoking status: Former Smoker     Types: Cigarettes, Pipe, Cigars     Quit date: 10/4/1981    Smokeless tobacco: Never Used      Comment: social smoker    Alcohol use Yes      Comment: ocassionally        Subjective:      Review of Systems   Constitutional: Negative. HENT: Negative. Respiratory: Negative. Cardiovascular: Negative. Gastrointestinal:        Resolved gi issues   Genitourinary:        Documented metastatic prostate cancer on casodex   No on any other antiandrogens  States emptying bladder ok  Hx of prostate surgery and radiation   Musculoskeletal: Negative. Skin: Negative. Neurological: Negative. Psychiatric/Behavioral: Negative. Objective:     /60 (Site: Left Arm, Position: Sitting)   Pulse 88   Wt 167 lb (75.8 kg)   SpO2 98%   BMI 22.65 kg/m²     Physical Exam   Constitutional: He is oriented to person, place, and time. He appears well-developed and well-nourished. No distress. Neck: No JVD present. Cardiovascular: Normal rate, regular rhythm, normal heart sounds and intact distal pulses. Exam reveals no gallop and no friction rub. No murmur heard. Pulmonary/Chest: Effort normal and breath sounds normal. No respiratory distress. He has no wheezes. He has no rales. He exhibits no tenderness. Abdominal: Soft. Bowel sounds are normal. He exhibits no distension. There is no tenderness. There is no guarding. Musculoskeletal: Normal range of motion. He exhibits no edema or tenderness. Lymphadenopathy:     He has no cervical adenopathy. Neurological: He is alert and oriented to person, place, and time. Skin: Skin is warm and dry. No rash noted. He is not diaphoretic. No pallor. Psychiatric: He has a normal mood and affect. His behavior is normal. Judgment and thought content normal.   Nursing note and vitals reviewed.     CBC:   Lab Results   Component Value Date    WBC 4.1 (L) 11/16/2017    HGB 7.1 (L) 11/16/2017    HCT 22.6 (L) 11/16/2017    MCV 89.8 11/16/2017     11/16/2017     BMP:    Lab Results   Component Value Date     11/16/2017     11/13/2017     11/12/2017    K 4.3 11/16/2017    K 4.3 11/15/2017    K 4.0 11/13/2017     11/16/2017    CL 98 11/13/2017    CL 99 11/12/2017    CO2 26 11/16/2017    CO2 25 11/13/2017    CO2 27 11/12/2017    BUN 11 11/16/2017    BUN 14 11/13/2017    BUN 16 11/12/2017    CREATININE 0.5 11/16/2017    CREATININE 0.5 11/13/2017    CREATININE 0.6 11/12/2017    GLUCOSE 87 11/16/2017    GLUCOSE 79 11/13/2017    GLUCOSE 100 11/12/2017      Hepatic:   Lab Results   Component Value Date    AST 22 11/11/2017    AST 17 11/10/2017    AST 44 02/03/2014    ALT <5 (L) 11/11/2017    ALT <5 (L) 11/10/2017    ALT 24 02/03/2014    BILITOT 0.4 11/11/2017    BILITOT 0.4 11/10/2017    BILITOT 0.4 02/03/2014    ALKPHOS 1,032 (H) 11/11/2017    ALKPHOS 993 (H) 11/10/2017    ALKPHOS 110 02/03/2014     BNP: No results found for: BNP  Lipids: No results found for: CHOL, HDL  INR:   Lab Results   Component Value Date    INR 1.22 (H) 11/11/2017          URINE:   Lab Results   Component Value Date    PROTUR Negative 11/20/2017                               Lab Results   Component Value Date    NITRU Negative 11/20/2017    COLORU Yellow 11/20/2017    COLORU YELLOW 11/12/2017    PHUR 7.5 11/12/2017    WBCUA 50-75 11/12/2017    RBCUA 0-2 11/12/2017    YEAST NONE SEEN 11/12/2017    BACTERIA MANY 11/12/2017    CLARITYU Clear 10/11/2016    SPECGRAV 1.018 06/09/2014    LEUKOCYTESUR MODERATE 11/12/2017    UROBILINOGEN 0.20 11/20/2017    BILIRUBINUR Negative 11/20/2017    BLOODU Negative 11/20/2017    BLOODU NEGATIVE 11/12/2017    GLUCOSEU Negative 11/20/2017    KETUA Trace 11/20/2017         Microalbumen/Creatinine ratio:  No components found for: RUCREAT  Assessment:   Chronic kidney disease stage 1 stable without evidence of obstruction   metatatic cancer prostate on antiandrogen > 3 years  Anemia ? Of chronic neoplasm, ?iron defiency,  Occult bleeding?           Plan:   Fu annually   Check retic iron and discussed this with jpatient  Serial labs  Discussed whether he is actually taking the prescribed meds      Joanne Davidson,

## 2017-11-22 RX ORDER — BICALUTAMIDE 50 MG/1
50 TABLET, FILM COATED ORAL DAILY
Qty: 90 TABLET | Refills: 3 | Status: SHIPPED | OUTPATIENT
Start: 2017-11-22 | End: 2018-01-01

## 2017-11-29 ENCOUNTER — HOSPITAL ENCOUNTER (OUTPATIENT)
Age: 67
Discharge: HOME | End: 2017-11-29
Payer: MEDICARE

## 2017-11-29 DIAGNOSIS — C79.51 CANCER, METASTATIC TO BONE (HCC): ICD-10-CM

## 2017-11-29 LAB
ANION GAP SERPL CALCULATED.3IONS-SCNC: 11 MEQ/L (ref 8–16)
BUN BLDV-MCNC: 11 MG/DL (ref 7–22)
CALCIUM SERPL-MCNC: 7.6 MG/DL (ref 8.5–10.5)
CHLORIDE BLD-SCNC: 103 MEQ/L (ref 98–111)
CO2: 30 MEQ/L (ref 23–33)
CREAT SERPL-MCNC: 0.5 MG/DL (ref 0.4–1.2)
GFR SERPL CREATININE-BSD FRML MDRD: > 90 ML/MIN/1.73M2
GLUCOSE BLD-MCNC: 84 MG/DL (ref 70–108)
HCT VFR BLD CALC: 22.5 % (ref 42–52)
HEMOGLOBIN: 7.1 GM/DL (ref 14–18)
IRON SATURATION: 51 % (ref 20–50)
IRON: 72 UG/DL (ref 65–195)
MCH RBC QN AUTO: 29 PG (ref 27–31)
MCHC RBC AUTO-ENTMCNC: 31.7 GM/DL (ref 33–37)
MCV RBC AUTO: 91.5 FL (ref 80–94)
PDW BLD-RTO: 23.9 % (ref 11.5–14.5)
PLATELET # BLD: 407 THOU/MM3 (ref 130–400)
PMV BLD AUTO: 6.6 MCM (ref 7.4–10.4)
POTASSIUM SERPL-SCNC: 3.4 MEQ/L (ref 3.5–5.2)
RBC # BLD: 2.46 MILL/MM3 (ref 4.7–6.1)
RETICULOCYTE ABSOLUTE COUNT: 2.1 % (ref 0.5–2)
SODIUM BLD-SCNC: 144 MEQ/L (ref 135–145)
TOTAL IRON BINDING CAPACITY: 142 UG/DL (ref 171–450)
WBC # BLD: 3.1 THOU/MM3 (ref 4.8–10.8)

## 2017-11-29 PROCEDURE — 36415 COLL VENOUS BLD VENIPUNCTURE: CPT

## 2017-11-29 PROCEDURE — 85027 COMPLETE CBC AUTOMATED: CPT

## 2017-11-29 PROCEDURE — 80048 BASIC METABOLIC PNL TOTAL CA: CPT

## 2017-11-29 PROCEDURE — 83540 ASSAY OF IRON: CPT

## 2017-11-29 PROCEDURE — 85045 AUTOMATED RETICULOCYTE COUNT: CPT

## 2017-11-29 PROCEDURE — 83550 IRON BINDING TEST: CPT

## 2017-12-11 ENCOUNTER — TELEPHONE (OUTPATIENT)
Dept: UROLOGY | Age: 67
End: 2017-12-11

## 2017-12-19 NOTE — PROGRESS NOTES
Following Dr. Paul Matamoros. FIRMAGON 80 MG GIVEN S.C Right ABDOMEN. Lot Number: [id number]  Expiration Date:   [facility] #: [address]-5    After Injection was given there were no reactions at injection site and patient was feeling well. Patient was notified that possible side effects from injections include: Redness, swelling and itching at the injection site. Possible side effects of androgen deprivation therapy, including hot flashes, flushing of the skin, increased weight, decreased sex drive, and difficulties with ED. Patient was instructed to call the office with any further questions or concerns. Patient supplied their own medications No    Pt [facility] therapy first initiated on 11-23-15          Following Dr. Paul Kraft of [employer]. XGEVA 120MG/1.7ML MG GIVEN S.C Right ARM  Lot Number: [id number]  Expiration Date: 11-19  [facility] #: [id number]    After Injection was given there were no reactions at injection site and patient was feeling well. Patient was notified that possible side effects from injections include: Redness, swelling and itching at the injection site. Possible side effects of androgen deprivation therapy, including hot flashes, flushing of the skin, increased weight, decreased sex drive, and difficulties with ED. Patient was instructed to inform their dental office that they are receiving Johney Binning and that major dental procedures should be avoided. Patient was advised to call our office with any further questions or concerns. Patient supplied their own medications No    Pt [id number] therapy first initiated on 12-19-17.

## 2017-12-19 NOTE — PROGRESS NOTES
Vickii Klinefelter from the [place] called Friday with concerns regarding the pt's prostate cancer, she tried to talk to pt regarding a referral to Dr Arvin Valenzuela pt stated he didn't need to see Dr Arvin Valenzuela because he wasn't sure if it would interfere with Dr Lisa Null of care. Phyllis Meeks thought pt seemed more confused last Friday when she spoke to him so she would like us to talk to him and explain he needs to go see Dr Arvin Valenzueal.

## 2017-12-21 NOTE — TELEPHONE ENCOUNTER
charmaine johnson called office today stating he had gotten a call from [address] [facility] regarding a surgery, for his cancer, and was wanting to know if he wants it in [address] [facility] or [place]. He states he doesn't know what is going on and is asking for a call back to discuss this with him so he can understand. Would you please call this gentleman? If there is someithing I can do, please let me know. Thank you. I looked in chart, but was not sure what is being proposed for him.

## 2017-12-22 NOTE — TELEPHONE ENCOUNTER
We discussed this extensively in the office several times the other day. There are NO plans for surgery. Please reassure the pt there are no plans for surgery.

## 2017-12-28 NOTE — TELEPHONE ENCOUNTER
Message from Gabby given to rotan regarding surgery, and none being on the agenda for him here, at this time. He will contact dr Shahid Neville at the [place] to see if he can find out who called him from the [place]  About surgery.

## 2018-01-01 ENCOUNTER — TELEPHONE (OUTPATIENT)
Dept: SURGERY | Age: 68
End: 2018-01-01

## 2018-01-01 ENCOUNTER — TELEPHONE (OUTPATIENT)
Dept: ONCOLOGY | Age: 68
End: 2018-01-01

## 2018-01-01 ENCOUNTER — HOSPITAL ENCOUNTER (OUTPATIENT)
Dept: INFUSION THERAPY | Age: 68
Discharge: HOME | End: 2018-09-24
Payer: OTHER GOVERNMENT

## 2018-01-01 ENCOUNTER — HOSPITAL ENCOUNTER (OUTPATIENT)
Dept: CT IMAGING | Age: 68
Discharge: HOME | End: 2018-06-29
Payer: OTHER GOVERNMENT

## 2018-01-01 ENCOUNTER — TELEPHONE (OUTPATIENT)
Dept: INFUSION THERAPY | Age: 68
End: 2018-01-01

## 2018-01-01 ENCOUNTER — HOSPITAL ENCOUNTER (OUTPATIENT)
Dept: INFUSION THERAPY | Age: 68
Discharge: HOME | End: 2018-07-25
Payer: OTHER GOVERNMENT

## 2018-01-01 ENCOUNTER — APPOINTMENT (OUTPATIENT)
Dept: GENERAL RADIOLOGY | Age: 68
DRG: 391 | End: 2018-01-01
Payer: OTHER GOVERNMENT

## 2018-01-01 ENCOUNTER — APPOINTMENT (OUTPATIENT)
Dept: CT IMAGING | Age: 68
End: 2018-01-01
Payer: MEDICARE

## 2018-01-01 ENCOUNTER — HOSPITAL ENCOUNTER (OUTPATIENT)
Dept: INFUSION THERAPY | Age: 68
Discharge: HOME | End: 2018-08-01
Payer: OTHER GOVERNMENT

## 2018-01-01 ENCOUNTER — APPOINTMENT (OUTPATIENT)
Dept: MRI IMAGING | Age: 68
End: 2018-01-01
Payer: MEDICARE

## 2018-01-01 ENCOUNTER — TELEPHONE (OUTPATIENT)
Dept: UROLOGY | Age: 68
End: 2018-01-01

## 2018-01-01 ENCOUNTER — CLINICAL DOCUMENTATION (OUTPATIENT)
Dept: SPIRITUAL SERVICES | Facility: CLINIC | Age: 68
End: 2018-01-01

## 2018-01-01 ENCOUNTER — OFFICE VISIT (OUTPATIENT)
Dept: ONCOLOGY | Age: 68
End: 2018-01-01
Payer: OTHER GOVERNMENT

## 2018-01-01 ENCOUNTER — HOSPITAL ENCOUNTER (OUTPATIENT)
Dept: INFUSION THERAPY | Age: 68
Discharge: HOME | End: 2018-10-15
Payer: OTHER GOVERNMENT

## 2018-01-01 ENCOUNTER — APPOINTMENT (OUTPATIENT)
Dept: GENERAL RADIOLOGY | Age: 68
End: 2018-01-01
Payer: MEDICARE

## 2018-01-01 ENCOUNTER — APPOINTMENT (OUTPATIENT)
Dept: GENERAL RADIOLOGY | Age: 68
DRG: 689 | End: 2018-01-01
Payer: MEDICARE

## 2018-01-01 ENCOUNTER — NURSE ONLY (OUTPATIENT)
Dept: UROLOGY | Age: 68
End: 2018-01-01
Payer: OTHER GOVERNMENT

## 2018-01-01 ENCOUNTER — HOSPITAL ENCOUNTER (OUTPATIENT)
Age: 68
Discharge: HOME | End: 2018-07-26
Payer: OTHER GOVERNMENT

## 2018-01-01 ENCOUNTER — HOSPITAL ENCOUNTER (OUTPATIENT)
Age: 68
Setting detail: OUTPATIENT SURGERY
Discharge: HOME | End: 2018-07-11
Attending: SURGERY | Admitting: SURGERY
Payer: OTHER GOVERNMENT

## 2018-01-01 ENCOUNTER — HOSPITAL ENCOUNTER (OUTPATIENT)
Dept: INFUSION THERAPY | Age: 68
Discharge: HOME | End: 2018-06-21
Payer: OTHER GOVERNMENT

## 2018-01-01 ENCOUNTER — HOSPITAL ENCOUNTER (EMERGENCY)
Age: 68
Discharge: HOME | End: 2018-10-14
Payer: MEDICARE

## 2018-01-01 ENCOUNTER — HOSPITAL ENCOUNTER (OUTPATIENT)
Dept: INFUSION THERAPY | Age: 68
Discharge: HOME | End: 2018-11-30
Payer: OTHER GOVERNMENT

## 2018-01-01 ENCOUNTER — CLINICAL DOCUMENTATION (OUTPATIENT)
Dept: CASE MANAGEMENT | Age: 68
End: 2018-01-01

## 2018-01-01 ENCOUNTER — HOSPITAL ENCOUNTER (OUTPATIENT)
Dept: INFUSION THERAPY | Age: 68
Discharge: HOME | End: 2018-08-22
Payer: OTHER GOVERNMENT

## 2018-01-01 ENCOUNTER — OFFICE VISIT (OUTPATIENT)
Dept: CARDIOLOGY CLINIC | Age: 68
End: 2018-01-01
Payer: MEDICARE

## 2018-01-01 ENCOUNTER — HOSPITAL ENCOUNTER (OUTPATIENT)
Dept: INFUSION THERAPY | Age: 68
Discharge: HOME | End: 2018-08-13
Payer: OTHER GOVERNMENT

## 2018-01-01 ENCOUNTER — HOSPITAL ENCOUNTER (OUTPATIENT)
Age: 68
Discharge: HOME | End: 2018-02-27
Payer: OTHER GOVERNMENT

## 2018-01-01 ENCOUNTER — CLINICAL DOCUMENTATION (OUTPATIENT)
Dept: INFUSION THERAPY | Age: 68
End: 2018-01-01

## 2018-01-01 ENCOUNTER — HOSPITAL ENCOUNTER (OUTPATIENT)
Dept: INFUSION THERAPY | Age: 68
Discharge: HOME | DRG: 689 | End: 2018-09-12
Payer: MEDICARE

## 2018-01-01 ENCOUNTER — OFFICE VISIT (OUTPATIENT)
Dept: ONCOLOGY | Age: 68
End: 2018-01-01
Payer: MEDICARE

## 2018-01-01 ENCOUNTER — HOSPITAL ENCOUNTER (EMERGENCY)
Age: 68
Discharge: HOME | End: 2018-12-10
Attending: EMERGENCY MEDICINE
Payer: MEDICARE

## 2018-01-01 ENCOUNTER — HOSPITAL ENCOUNTER (OUTPATIENT)
Dept: INFUSION THERAPY | Age: 68
Discharge: HOME | End: 2018-07-05
Payer: OTHER GOVERNMENT

## 2018-01-01 ENCOUNTER — APPOINTMENT (OUTPATIENT)
Dept: GENERAL RADIOLOGY | Age: 68
End: 2018-01-01
Attending: SURGERY
Payer: OTHER GOVERNMENT

## 2018-01-01 ENCOUNTER — HOSPITAL ENCOUNTER (OUTPATIENT)
Age: 68
Discharge: HOME | End: 2018-01-08
Payer: OTHER GOVERNMENT

## 2018-01-01 ENCOUNTER — CASE MANAGEMENT (OUTPATIENT)
Dept: CASE MANAGEMENT | Age: 68
End: 2018-01-01

## 2018-01-01 ENCOUNTER — HOSPITAL ENCOUNTER (INPATIENT)
Age: 68
LOS: 2 days | DRG: 723 | End: 2018-12-18
Attending: INTERNAL MEDICINE | Admitting: INTERNAL MEDICINE
Payer: MEDICARE

## 2018-01-01 ENCOUNTER — ANESTHESIA EVENT (OUTPATIENT)
Dept: OPERATING ROOM | Age: 68
End: 2018-01-01
Payer: OTHER GOVERNMENT

## 2018-01-01 ENCOUNTER — HOSPITAL ENCOUNTER (OUTPATIENT)
Age: 68
Discharge: HOME | End: 2018-10-19
Payer: MEDICARE

## 2018-01-01 ENCOUNTER — OFFICE VISIT (OUTPATIENT)
Dept: UROLOGY | Age: 68
End: 2018-01-01
Payer: OTHER GOVERNMENT

## 2018-01-01 ENCOUNTER — APPOINTMENT (OUTPATIENT)
Dept: NUCLEAR MEDICINE | Age: 68
End: 2018-01-01
Payer: OTHER GOVERNMENT

## 2018-01-01 ENCOUNTER — HOSPITAL ENCOUNTER (OUTPATIENT)
Dept: INFUSION THERAPY | Age: 68
Discharge: HOME | End: 2018-10-02
Payer: OTHER GOVERNMENT

## 2018-01-01 ENCOUNTER — HOSPITAL ENCOUNTER (OUTPATIENT)
Dept: NUCLEAR MEDICINE | Age: 68
Discharge: HOME | End: 2018-09-07
Payer: OTHER GOVERNMENT

## 2018-01-01 ENCOUNTER — TELEPHONE (OUTPATIENT)
Dept: CASE MANAGEMENT | Age: 68
End: 2018-01-01

## 2018-01-01 ENCOUNTER — HOSPITAL ENCOUNTER (OUTPATIENT)
Dept: INFUSION THERAPY | Age: 68
Discharge: HOME | End: 2018-08-29
Payer: OTHER GOVERNMENT

## 2018-01-01 ENCOUNTER — HOSPITAL ENCOUNTER (OUTPATIENT)
Dept: INFUSION THERAPY | Age: 68
Discharge: HOME | End: 2018-08-17
Payer: OTHER GOVERNMENT

## 2018-01-01 ENCOUNTER — HOSPITAL ENCOUNTER (OUTPATIENT)
Dept: INFUSION THERAPY | Age: 68
Discharge: HOME | End: 2018-11-26
Payer: MEDICARE

## 2018-01-01 ENCOUNTER — APPOINTMENT (OUTPATIENT)
Dept: INTERVENTIONAL RADIOLOGY/VASCULAR | Age: 68
DRG: 689 | End: 2018-01-01
Payer: MEDICARE

## 2018-01-01 ENCOUNTER — HOSPITAL ENCOUNTER (INPATIENT)
Age: 68
LOS: 6 days | Discharge: HOME | DRG: 689 | End: 2018-09-19
Attending: INTERNAL MEDICINE | Admitting: INTERNAL MEDICINE
Payer: MEDICARE

## 2018-01-01 ENCOUNTER — APPOINTMENT (OUTPATIENT)
Dept: CT IMAGING | Age: 68
DRG: 689 | End: 2018-01-01
Payer: MEDICARE

## 2018-01-01 ENCOUNTER — OFFICE VISIT (OUTPATIENT)
Dept: SURGERY | Age: 68
End: 2018-01-01
Payer: OTHER GOVERNMENT

## 2018-01-01 ENCOUNTER — HOSPITAL ENCOUNTER (OUTPATIENT)
Dept: INFUSION THERAPY | Age: 68
Discharge: HOME | End: 2018-11-05
Payer: OTHER GOVERNMENT

## 2018-01-01 ENCOUNTER — ANESTHESIA (OUTPATIENT)
Dept: OPERATING ROOM | Age: 68
End: 2018-01-01
Payer: OTHER GOVERNMENT

## 2018-01-01 ENCOUNTER — HOSPITAL ENCOUNTER (OUTPATIENT)
Age: 68
Discharge: HOME | End: 2018-04-25
Payer: OTHER GOVERNMENT

## 2018-01-01 ENCOUNTER — HOSPITAL ENCOUNTER (OUTPATIENT)
Age: 68
Discharge: HOME | End: 2018-05-21
Payer: OTHER GOVERNMENT

## 2018-01-01 ENCOUNTER — HOSPITAL ENCOUNTER (OUTPATIENT)
Dept: INFUSION THERAPY | Age: 68
Discharge: HOME | End: 2018-11-07
Payer: OTHER GOVERNMENT

## 2018-01-01 ENCOUNTER — HOSPITAL ENCOUNTER (OUTPATIENT)
Dept: INFUSION THERAPY | Age: 68
Discharge: HOME | End: 2018-08-08
Payer: OTHER GOVERNMENT

## 2018-01-01 ENCOUNTER — HOSPITAL ENCOUNTER (EMERGENCY)
Age: 68
Discharge: HOME | End: 2018-07-07
Attending: FAMILY MEDICINE
Payer: MEDICARE

## 2018-01-01 ENCOUNTER — HOSPITAL ENCOUNTER (OUTPATIENT)
Dept: INFUSION THERAPY | Age: 68
Discharge: HOME | End: 2018-11-12
Payer: OTHER GOVERNMENT

## 2018-01-01 ENCOUNTER — TELEPHONE (OUTPATIENT)
Dept: CARDIOLOGY CLINIC | Age: 68
End: 2018-01-01

## 2018-01-01 ENCOUNTER — HOSPITAL ENCOUNTER (OUTPATIENT)
Dept: INFUSION THERAPY | Age: 68
Discharge: HOME | End: 2018-10-25
Payer: MEDICARE

## 2018-01-01 ENCOUNTER — HOSPITAL ENCOUNTER (INPATIENT)
Age: 68
LOS: 2 days | Discharge: HOME | DRG: 391 | End: 2018-07-22
Attending: FAMILY MEDICINE | Admitting: INTERNAL MEDICINE
Payer: OTHER GOVERNMENT

## 2018-01-01 ENCOUNTER — HOSPITAL ENCOUNTER (OUTPATIENT)
Dept: INFUSION THERAPY | Age: 68
Discharge: HOME | End: 2018-08-15
Payer: MEDICARE

## 2018-01-01 ENCOUNTER — HOSPITAL ENCOUNTER (OUTPATIENT)
Dept: INFUSION THERAPY | Age: 68
End: 2018-01-01
Payer: MEDICARE

## 2018-01-01 ENCOUNTER — HOSPITAL ENCOUNTER (OUTPATIENT)
Dept: INFUSION THERAPY | Age: 68
Discharge: HOME | End: 2018-01-19

## 2018-01-01 ENCOUNTER — HOSPITAL ENCOUNTER (OUTPATIENT)
Dept: INFUSION THERAPY | Age: 68
Discharge: HOME | End: 2018-10-31
Payer: OTHER GOVERNMENT

## 2018-01-01 ENCOUNTER — HOSPITAL ENCOUNTER (OUTPATIENT)
Age: 68
Setting detail: OBSERVATION
Discharge: HOME | End: 2018-10-10
Attending: EMERGENCY MEDICINE | Admitting: INTERNAL MEDICINE
Payer: MEDICARE

## 2018-01-01 ENCOUNTER — CLINICAL DOCUMENTATION (OUTPATIENT)
Dept: ONCOLOGY | Age: 68
End: 2018-01-01

## 2018-01-01 ENCOUNTER — HOSPITAL ENCOUNTER (EMERGENCY)
Age: 68
Discharge: HOME | DRG: 391 | End: 2018-07-19
Attending: INTERNAL MEDICINE
Payer: OTHER GOVERNMENT

## 2018-01-01 ENCOUNTER — HOSPITAL ENCOUNTER (EMERGENCY)
Age: 68
Discharge: HOME | End: 2018-07-14
Attending: EMERGENCY MEDICINE
Payer: MEDICARE

## 2018-01-01 ENCOUNTER — HOSPITAL ENCOUNTER (OUTPATIENT)
Dept: INFUSION THERAPY | Age: 68
Discharge: HOME | End: 2018-07-11
Payer: OTHER GOVERNMENT

## 2018-01-01 ENCOUNTER — HOSPITAL ENCOUNTER (OUTPATIENT)
Dept: INFUSION THERAPY | Age: 68
Discharge: HOME | End: 2018-07-18
Payer: OTHER GOVERNMENT

## 2018-01-01 ENCOUNTER — HOSPITAL ENCOUNTER (OUTPATIENT)
Dept: INFUSION THERAPY | Age: 68
Discharge: HOME | End: 2018-08-10
Payer: OTHER GOVERNMENT

## 2018-01-01 ENCOUNTER — HOSPITAL ENCOUNTER (OUTPATIENT)
Dept: INFUSION THERAPY | Age: 68
Discharge: HOME | End: 2018-07-12
Payer: OTHER GOVERNMENT

## 2018-01-01 ENCOUNTER — HOSPITAL ENCOUNTER (EMERGENCY)
Age: 68
Discharge: HOME | End: 2018-10-23
Payer: MEDICARE

## 2018-01-01 ENCOUNTER — HOSPITAL ENCOUNTER (INPATIENT)
Age: 68
LOS: 2 days | Discharge: HOME | DRG: 388 | End: 2018-08-20
Attending: EMERGENCY MEDICINE | Admitting: INTERNAL MEDICINE
Payer: MEDICARE

## 2018-01-01 ENCOUNTER — HOSPITAL ENCOUNTER (OUTPATIENT)
Age: 68
Discharge: HOME | End: 2018-03-09
Payer: OTHER GOVERNMENT

## 2018-01-01 ENCOUNTER — HOSPITAL ENCOUNTER (OUTPATIENT)
Dept: INFUSION THERAPY | Age: 68
Discharge: HOME | End: 2018-01-08
Payer: OTHER GOVERNMENT

## 2018-01-01 ENCOUNTER — APPOINTMENT (OUTPATIENT)
Dept: GENERAL RADIOLOGY | Age: 68
DRG: 388 | End: 2018-01-01
Payer: MEDICARE

## 2018-01-01 ENCOUNTER — HOSPITAL ENCOUNTER (OUTPATIENT)
Dept: NUCLEAR MEDICINE | Age: 68
Discharge: HOME | End: 2018-06-29
Payer: OTHER GOVERNMENT

## 2018-01-01 ENCOUNTER — HOSPITAL ENCOUNTER (OUTPATIENT)
Dept: INTERVENTIONAL RADIOLOGY/VASCULAR | Age: 68
Discharge: HOME | End: 2018-07-25
Payer: OTHER GOVERNMENT

## 2018-01-01 ENCOUNTER — APPOINTMENT (OUTPATIENT)
Dept: CT IMAGING | Age: 68
DRG: 388 | End: 2018-01-01
Payer: MEDICARE

## 2018-01-01 ENCOUNTER — OFFICE VISIT (OUTPATIENT)
Dept: NEPHROLOGY | Age: 68
End: 2018-01-01
Payer: OTHER GOVERNMENT

## 2018-01-01 ENCOUNTER — HOSPITAL ENCOUNTER (EMERGENCY)
Age: 68
Discharge: HOME | End: 2018-08-06
Attending: EMERGENCY MEDICINE
Payer: MEDICARE

## 2018-01-01 ENCOUNTER — HOSPITAL ENCOUNTER (OUTPATIENT)
Dept: CT IMAGING | Age: 68
Discharge: HOME | End: 2018-09-06
Payer: OTHER GOVERNMENT

## 2018-01-01 ENCOUNTER — APPOINTMENT (OUTPATIENT)
Dept: MRI IMAGING | Age: 68
DRG: 689 | End: 2018-01-01
Payer: MEDICARE

## 2018-01-01 ENCOUNTER — HOSPITAL ENCOUNTER (EMERGENCY)
Age: 68
Discharge: HOME | End: 2018-11-27
Payer: MEDICARE

## 2018-01-01 ENCOUNTER — APPOINTMENT (OUTPATIENT)
Dept: NON INVASIVE DIAGNOSTICS | Age: 68
DRG: 689 | End: 2018-01-01
Payer: MEDICARE

## 2018-01-01 VITALS
HEIGHT: 74 IN | TEMPERATURE: 98 F | OXYGEN SATURATION: 100 % | HEART RATE: 69 BPM | SYSTOLIC BLOOD PRESSURE: 111 MMHG | DIASTOLIC BLOOD PRESSURE: 64 MMHG | BODY MASS INDEX: 21.12 KG/M2 | RESPIRATION RATE: 16 BRPM | WEIGHT: 164.6 LBS

## 2018-01-01 VITALS
WEIGHT: 146 LBS | OXYGEN SATURATION: 100 % | DIASTOLIC BLOOD PRESSURE: 63 MMHG | HEIGHT: 74 IN | BODY MASS INDEX: 18.74 KG/M2 | SYSTOLIC BLOOD PRESSURE: 114 MMHG | HEART RATE: 76 BPM | TEMPERATURE: 97.6 F | RESPIRATION RATE: 16 BRPM

## 2018-01-01 VITALS
DIASTOLIC BLOOD PRESSURE: 60 MMHG | RESPIRATION RATE: 18 BRPM | SYSTOLIC BLOOD PRESSURE: 115 MMHG | HEART RATE: 91 BPM | TEMPERATURE: 99.1 F | OXYGEN SATURATION: 100 %

## 2018-01-01 VITALS
WEIGHT: 171.6 LBS | HEART RATE: 78 BPM | HEIGHT: 74 IN | SYSTOLIC BLOOD PRESSURE: 105 MMHG | DIASTOLIC BLOOD PRESSURE: 58 MMHG | RESPIRATION RATE: 18 BRPM | TEMPERATURE: 96.5 F | BODY MASS INDEX: 22.02 KG/M2

## 2018-01-01 VITALS
TEMPERATURE: 98.1 F | WEIGHT: 149.38 LBS | DIASTOLIC BLOOD PRESSURE: 74 MMHG | HEART RATE: 71 BPM | SYSTOLIC BLOOD PRESSURE: 124 MMHG | OXYGEN SATURATION: 100 % | HEIGHT: 74 IN | BODY MASS INDEX: 19.17 KG/M2 | RESPIRATION RATE: 18 BRPM

## 2018-01-01 VITALS
TEMPERATURE: 98.3 F | OXYGEN SATURATION: 100 % | RESPIRATION RATE: 16 BRPM | DIASTOLIC BLOOD PRESSURE: 58 MMHG | SYSTOLIC BLOOD PRESSURE: 107 MMHG | HEART RATE: 83 BPM

## 2018-01-01 VITALS
OXYGEN SATURATION: 99 % | HEART RATE: 77 BPM | SYSTOLIC BLOOD PRESSURE: 111 MMHG | TEMPERATURE: 97.5 F | DIASTOLIC BLOOD PRESSURE: 77 MMHG | WEIGHT: 149 LBS | RESPIRATION RATE: 18 BRPM | HEIGHT: 74 IN | BODY MASS INDEX: 19.12 KG/M2

## 2018-01-01 VITALS
HEIGHT: 74 IN | RESPIRATION RATE: 16 BRPM | HEART RATE: 82 BPM | TEMPERATURE: 98 F | WEIGHT: 146.6 LBS | BODY MASS INDEX: 18.82 KG/M2 | OXYGEN SATURATION: 95 % | DIASTOLIC BLOOD PRESSURE: 66 MMHG | SYSTOLIC BLOOD PRESSURE: 102 MMHG

## 2018-01-01 VITALS
HEIGHT: 70 IN | HEART RATE: 88 BPM | WEIGHT: 143 LBS | OXYGEN SATURATION: 100 % | TEMPERATURE: 98.1 F | DIASTOLIC BLOOD PRESSURE: 69 MMHG | SYSTOLIC BLOOD PRESSURE: 106 MMHG | RESPIRATION RATE: 16 BRPM | BODY MASS INDEX: 20.47 KG/M2

## 2018-01-01 VITALS
SYSTOLIC BLOOD PRESSURE: 116 MMHG | HEART RATE: 80 BPM | RESPIRATION RATE: 20 BRPM | TEMPERATURE: 97.8 F | DIASTOLIC BLOOD PRESSURE: 76 MMHG | OXYGEN SATURATION: 100 %

## 2018-01-01 VITALS
DIASTOLIC BLOOD PRESSURE: 65 MMHG | HEART RATE: 70 BPM | HEIGHT: 74 IN | RESPIRATION RATE: 18 BRPM | BODY MASS INDEX: 18.74 KG/M2 | SYSTOLIC BLOOD PRESSURE: 118 MMHG | TEMPERATURE: 97.7 F | WEIGHT: 146 LBS | OXYGEN SATURATION: 100 %

## 2018-01-01 VITALS
OXYGEN SATURATION: 99 % | BODY MASS INDEX: 20.59 KG/M2 | TEMPERATURE: 97.8 F | DIASTOLIC BLOOD PRESSURE: 55 MMHG | HEIGHT: 70 IN | SYSTOLIC BLOOD PRESSURE: 97 MMHG | HEART RATE: 91 BPM | WEIGHT: 143.8 LBS | RESPIRATION RATE: 16 BRPM

## 2018-01-01 VITALS
DIASTOLIC BLOOD PRESSURE: 74 MMHG | SYSTOLIC BLOOD PRESSURE: 128 MMHG | OXYGEN SATURATION: 100 % | RESPIRATION RATE: 18 BRPM | HEART RATE: 75 BPM | TEMPERATURE: 98 F

## 2018-01-01 VITALS
SYSTOLIC BLOOD PRESSURE: 113 MMHG | TEMPERATURE: 98 F | WEIGHT: 164.6 LBS | RESPIRATION RATE: 18 BRPM | OXYGEN SATURATION: 100 % | HEIGHT: 74 IN | DIASTOLIC BLOOD PRESSURE: 64 MMHG | BODY MASS INDEX: 21.12 KG/M2 | HEART RATE: 71 BPM

## 2018-01-01 VITALS
SYSTOLIC BLOOD PRESSURE: 118 MMHG | DIASTOLIC BLOOD PRESSURE: 72 MMHG | WEIGHT: 181 LBS | BODY MASS INDEX: 23.24 KG/M2 | HEART RATE: 91 BPM | OXYGEN SATURATION: 99 %

## 2018-01-01 VITALS
OXYGEN SATURATION: 100 % | RESPIRATION RATE: 16 BRPM | BODY MASS INDEX: 21.15 KG/M2 | HEIGHT: 74 IN | DIASTOLIC BLOOD PRESSURE: 63 MMHG | WEIGHT: 164.8 LBS | HEART RATE: 73 BPM | TEMPERATURE: 98.2 F | SYSTOLIC BLOOD PRESSURE: 115 MMHG

## 2018-01-01 VITALS
HEIGHT: 74 IN | SYSTOLIC BLOOD PRESSURE: 99 MMHG | DIASTOLIC BLOOD PRESSURE: 58 MMHG | BODY MASS INDEX: 19.64 KG/M2 | HEART RATE: 100 BPM | RESPIRATION RATE: 18 BRPM | TEMPERATURE: 98 F | OXYGEN SATURATION: 100 % | WEIGHT: 153 LBS

## 2018-01-01 VITALS
SYSTOLIC BLOOD PRESSURE: 107 MMHG | TEMPERATURE: 98.6 F | HEIGHT: 74 IN | BODY MASS INDEX: 18.07 KG/M2 | DIASTOLIC BLOOD PRESSURE: 69 MMHG | HEART RATE: 80 BPM | WEIGHT: 140.8 LBS | OXYGEN SATURATION: 99 % | RESPIRATION RATE: 18 BRPM

## 2018-01-01 VITALS
OXYGEN SATURATION: 97 % | HEIGHT: 74 IN | WEIGHT: 182.2 LBS | SYSTOLIC BLOOD PRESSURE: 109 MMHG | BODY MASS INDEX: 23.38 KG/M2 | RESPIRATION RATE: 16 BRPM | DIASTOLIC BLOOD PRESSURE: 66 MMHG | HEART RATE: 88 BPM | TEMPERATURE: 98.9 F

## 2018-01-01 VITALS
TEMPERATURE: 99.1 F | HEART RATE: 91 BPM | DIASTOLIC BLOOD PRESSURE: 60 MMHG | WEIGHT: 184.8 LBS | OXYGEN SATURATION: 100 % | BODY MASS INDEX: 23.72 KG/M2 | HEIGHT: 74 IN | RESPIRATION RATE: 18 BRPM | SYSTOLIC BLOOD PRESSURE: 115 MMHG

## 2018-01-01 VITALS
RESPIRATION RATE: 16 BRPM | TEMPERATURE: 98.2 F | WEIGHT: 135 LBS | HEART RATE: 76 BPM | OXYGEN SATURATION: 100 % | BODY MASS INDEX: 17.32 KG/M2 | SYSTOLIC BLOOD PRESSURE: 131 MMHG | DIASTOLIC BLOOD PRESSURE: 73 MMHG | HEIGHT: 74 IN

## 2018-01-01 VITALS
WEIGHT: 167 LBS | TEMPERATURE: 98.6 F | RESPIRATION RATE: 14 BRPM | SYSTOLIC BLOOD PRESSURE: 101 MMHG | HEIGHT: 74 IN | OXYGEN SATURATION: 80 % | HEART RATE: 100 BPM | BODY MASS INDEX: 21.43 KG/M2 | DIASTOLIC BLOOD PRESSURE: 57 MMHG

## 2018-01-01 VITALS
DIASTOLIC BLOOD PRESSURE: 64 MMHG | BODY MASS INDEX: 21.67 KG/M2 | HEIGHT: 70 IN | WEIGHT: 151.4 LBS | SYSTOLIC BLOOD PRESSURE: 111 MMHG | HEART RATE: 67 BPM | RESPIRATION RATE: 16 BRPM | TEMPERATURE: 97.8 F | OXYGEN SATURATION: 92 %

## 2018-01-01 VITALS
RESPIRATION RATE: 18 BRPM | BODY MASS INDEX: 17.84 KG/M2 | HEART RATE: 80 BPM | TEMPERATURE: 99 F | HEIGHT: 74 IN | WEIGHT: 139 LBS | DIASTOLIC BLOOD PRESSURE: 63 MMHG | SYSTOLIC BLOOD PRESSURE: 120 MMHG | OXYGEN SATURATION: 100 %

## 2018-01-01 VITALS
DIASTOLIC BLOOD PRESSURE: 62 MMHG | HEIGHT: 74 IN | BODY MASS INDEX: 23.23 KG/M2 | SYSTOLIC BLOOD PRESSURE: 128 MMHG | WEIGHT: 181 LBS

## 2018-01-01 VITALS
TEMPERATURE: 98.1 F | HEIGHT: 74 IN | DIASTOLIC BLOOD PRESSURE: 64 MMHG | HEART RATE: 88 BPM | OXYGEN SATURATION: 100 % | SYSTOLIC BLOOD PRESSURE: 123 MMHG | RESPIRATION RATE: 18 BRPM | WEIGHT: 147.4 LBS | BODY MASS INDEX: 18.92 KG/M2

## 2018-01-01 VITALS — HEIGHT: 73 IN | DIASTOLIC BLOOD PRESSURE: 62 MMHG | SYSTOLIC BLOOD PRESSURE: 104 MMHG

## 2018-01-01 VITALS
BODY MASS INDEX: 17.94 KG/M2 | HEART RATE: 73 BPM | RESPIRATION RATE: 16 BRPM | SYSTOLIC BLOOD PRESSURE: 141 MMHG | HEIGHT: 74 IN | OXYGEN SATURATION: 100 % | WEIGHT: 139.8 LBS | TEMPERATURE: 97.4 F | DIASTOLIC BLOOD PRESSURE: 79 MMHG

## 2018-01-01 VITALS — SYSTOLIC BLOOD PRESSURE: 105 MMHG | OXYGEN SATURATION: 100 % | DIASTOLIC BLOOD PRESSURE: 56 MMHG

## 2018-01-01 VITALS
BODY MASS INDEX: 18.07 KG/M2 | DIASTOLIC BLOOD PRESSURE: 63 MMHG | TEMPERATURE: 98.3 F | WEIGHT: 140.8 LBS | HEART RATE: 94 BPM | SYSTOLIC BLOOD PRESSURE: 129 MMHG | RESPIRATION RATE: 18 BRPM | OXYGEN SATURATION: 99 % | HEIGHT: 74 IN

## 2018-01-01 VITALS
HEART RATE: 90 BPM | SYSTOLIC BLOOD PRESSURE: 115 MMHG | OXYGEN SATURATION: 100 % | RESPIRATION RATE: 17 BRPM | TEMPERATURE: 98.9 F | DIASTOLIC BLOOD PRESSURE: 74 MMHG | HEIGHT: 74 IN | BODY MASS INDEX: 23.73 KG/M2

## 2018-01-01 VITALS
OXYGEN SATURATION: 99 % | TEMPERATURE: 98.2 F | RESPIRATION RATE: 16 BRPM | SYSTOLIC BLOOD PRESSURE: 126 MMHG | HEART RATE: 80 BPM | DIASTOLIC BLOOD PRESSURE: 76 MMHG | BODY MASS INDEX: 18.82 KG/M2 | WEIGHT: 146.6 LBS

## 2018-01-01 VITALS
WEIGHT: 140.8 LBS | DIASTOLIC BLOOD PRESSURE: 68 MMHG | HEIGHT: 74 IN | SYSTOLIC BLOOD PRESSURE: 112 MMHG | HEART RATE: 99 BPM | OXYGEN SATURATION: 98 % | RESPIRATION RATE: 18 BRPM | BODY MASS INDEX: 18.07 KG/M2 | TEMPERATURE: 98.5 F

## 2018-01-01 VITALS
BODY MASS INDEX: 23.36 KG/M2 | TEMPERATURE: 99.6 F | SYSTOLIC BLOOD PRESSURE: 119 MMHG | WEIGHT: 182 LBS | OXYGEN SATURATION: 97 % | HEART RATE: 83 BPM | RESPIRATION RATE: 18 BRPM | HEIGHT: 74 IN | DIASTOLIC BLOOD PRESSURE: 75 MMHG

## 2018-01-01 VITALS
HEIGHT: 74 IN | TEMPERATURE: 98.1 F | RESPIRATION RATE: 20 BRPM | OXYGEN SATURATION: 99 % | HEART RATE: 85 BPM | BODY MASS INDEX: 18.74 KG/M2 | SYSTOLIC BLOOD PRESSURE: 140 MMHG | DIASTOLIC BLOOD PRESSURE: 79 MMHG | WEIGHT: 146 LBS

## 2018-01-01 VITALS
TEMPERATURE: 98.5 F | RESPIRATION RATE: 16 BRPM | RESPIRATION RATE: 16 BRPM | DIASTOLIC BLOOD PRESSURE: 74 MMHG | OXYGEN SATURATION: 100 % | SYSTOLIC BLOOD PRESSURE: 116 MMHG | BODY MASS INDEX: 26.98 KG/M2 | TEMPERATURE: 97.7 F | DIASTOLIC BLOOD PRESSURE: 70 MMHG | HEIGHT: 72 IN | HEART RATE: 66 BPM | BODY MASS INDEX: 18.63 KG/M2 | HEART RATE: 99 BPM | OXYGEN SATURATION: 100 % | SYSTOLIC BLOOD PRESSURE: 123 MMHG | WEIGHT: 199.2 LBS | WEIGHT: 145.2 LBS

## 2018-01-01 VITALS
HEIGHT: 71 IN | BODY MASS INDEX: 21.81 KG/M2 | SYSTOLIC BLOOD PRESSURE: 94 MMHG | DIASTOLIC BLOOD PRESSURE: 58 MMHG | WEIGHT: 155.8 LBS

## 2018-01-01 VITALS
WEIGHT: 173 LBS | SYSTOLIC BLOOD PRESSURE: 102 MMHG | HEIGHT: 71 IN | DIASTOLIC BLOOD PRESSURE: 64 MMHG | BODY MASS INDEX: 24.22 KG/M2

## 2018-01-01 VITALS
BODY MASS INDEX: 17.89 KG/M2 | HEIGHT: 74 IN | HEART RATE: 80 BPM | TEMPERATURE: 98.3 F | SYSTOLIC BLOOD PRESSURE: 119 MMHG | WEIGHT: 139.4 LBS | OXYGEN SATURATION: 97 % | DIASTOLIC BLOOD PRESSURE: 72 MMHG | RESPIRATION RATE: 18 BRPM

## 2018-01-01 VITALS
OXYGEN SATURATION: 97 % | HEART RATE: 80 BPM | HEIGHT: 74 IN | RESPIRATION RATE: 18 BRPM | TEMPERATURE: 98.3 F | WEIGHT: 139.4 LBS | DIASTOLIC BLOOD PRESSURE: 72 MMHG | BODY MASS INDEX: 17.89 KG/M2 | SYSTOLIC BLOOD PRESSURE: 119 MMHG

## 2018-01-01 VITALS
HEART RATE: 96 BPM | DIASTOLIC BLOOD PRESSURE: 62 MMHG | TEMPERATURE: 97.4 F | HEIGHT: 72 IN | WEIGHT: 145 LBS | BODY MASS INDEX: 19.64 KG/M2 | SYSTOLIC BLOOD PRESSURE: 90 MMHG | OXYGEN SATURATION: 100 % | RESPIRATION RATE: 18 BRPM

## 2018-01-01 VITALS
HEART RATE: 69 BPM | DIASTOLIC BLOOD PRESSURE: 73 MMHG | WEIGHT: 143.8 LBS | SYSTOLIC BLOOD PRESSURE: 127 MMHG | RESPIRATION RATE: 20 BRPM | OXYGEN SATURATION: 100 % | TEMPERATURE: 97.9 F | HEIGHT: 74 IN | BODY MASS INDEX: 18.46 KG/M2

## 2018-01-01 VITALS
TEMPERATURE: 97.5 F | DIASTOLIC BLOOD PRESSURE: 77 MMHG | WEIGHT: 149 LBS | OXYGEN SATURATION: 99 % | HEART RATE: 77 BPM | HEIGHT: 74 IN | SYSTOLIC BLOOD PRESSURE: 111 MMHG | RESPIRATION RATE: 18 BRPM | BODY MASS INDEX: 19.12 KG/M2

## 2018-01-01 VITALS
SYSTOLIC BLOOD PRESSURE: 139 MMHG | OXYGEN SATURATION: 98 % | HEART RATE: 68 BPM | TEMPERATURE: 98.3 F | RESPIRATION RATE: 18 BRPM | DIASTOLIC BLOOD PRESSURE: 84 MMHG

## 2018-01-01 VITALS
DIASTOLIC BLOOD PRESSURE: 60 MMHG | SYSTOLIC BLOOD PRESSURE: 117 MMHG | OXYGEN SATURATION: 97 % | RESPIRATION RATE: 16 BRPM | BODY MASS INDEX: 23.38 KG/M2 | HEART RATE: 98 BPM | HEIGHT: 74 IN | TEMPERATURE: 97.8 F | WEIGHT: 182.2 LBS

## 2018-01-01 VITALS
SYSTOLIC BLOOD PRESSURE: 117 MMHG | WEIGHT: 143.9 LBS | BODY MASS INDEX: 18.47 KG/M2 | OXYGEN SATURATION: 100 % | TEMPERATURE: 98.7 F | RESPIRATION RATE: 18 BRPM | HEIGHT: 74 IN | HEART RATE: 87 BPM | DIASTOLIC BLOOD PRESSURE: 65 MMHG

## 2018-01-01 VITALS
SYSTOLIC BLOOD PRESSURE: 112 MMHG | WEIGHT: 145.6 LBS | OXYGEN SATURATION: 100 % | RESPIRATION RATE: 16 BRPM | HEIGHT: 74 IN | DIASTOLIC BLOOD PRESSURE: 72 MMHG | BODY MASS INDEX: 18.68 KG/M2 | HEART RATE: 67 BPM | TEMPERATURE: 97.6 F

## 2018-01-01 VITALS
WEIGHT: 141 LBS | SYSTOLIC BLOOD PRESSURE: 100 MMHG | HEIGHT: 74 IN | DIASTOLIC BLOOD PRESSURE: 62 MMHG | BODY MASS INDEX: 18.1 KG/M2

## 2018-01-01 VITALS
SYSTOLIC BLOOD PRESSURE: 129 MMHG | DIASTOLIC BLOOD PRESSURE: 82 MMHG | OXYGEN SATURATION: 100 % | HEIGHT: 74 IN | TEMPERATURE: 98 F | HEART RATE: 86 BPM | BODY MASS INDEX: 17.32 KG/M2 | RESPIRATION RATE: 15 BRPM | WEIGHT: 135 LBS

## 2018-01-01 VITALS
SYSTOLIC BLOOD PRESSURE: 114 MMHG | RESPIRATION RATE: 18 BRPM | BODY MASS INDEX: 19.17 KG/M2 | HEART RATE: 76 BPM | OXYGEN SATURATION: 100 % | WEIGHT: 149.38 LBS | TEMPERATURE: 98 F | DIASTOLIC BLOOD PRESSURE: 69 MMHG

## 2018-01-01 VITALS
HEIGHT: 71 IN | BODY MASS INDEX: 26.74 KG/M2 | DIASTOLIC BLOOD PRESSURE: 56 MMHG | SYSTOLIC BLOOD PRESSURE: 102 MMHG | WEIGHT: 191 LBS

## 2018-01-01 VITALS
OXYGEN SATURATION: 95 % | HEART RATE: 78 BPM | SYSTOLIC BLOOD PRESSURE: 120 MMHG | DIASTOLIC BLOOD PRESSURE: 70 MMHG | RESPIRATION RATE: 16 BRPM | TEMPERATURE: 98.8 F

## 2018-01-01 VITALS
DIASTOLIC BLOOD PRESSURE: 61 MMHG | BODY MASS INDEX: 18.74 KG/M2 | OXYGEN SATURATION: 100 % | HEIGHT: 74 IN | RESPIRATION RATE: 18 BRPM | SYSTOLIC BLOOD PRESSURE: 115 MMHG | HEART RATE: 69 BPM | WEIGHT: 146 LBS | TEMPERATURE: 97.5 F

## 2018-01-01 VITALS
DIASTOLIC BLOOD PRESSURE: 75 MMHG | HEART RATE: 84 BPM | HEIGHT: 74 IN | RESPIRATION RATE: 16 BRPM | OXYGEN SATURATION: 99 % | WEIGHT: 143.8 LBS | BODY MASS INDEX: 18.46 KG/M2 | TEMPERATURE: 98.5 F | SYSTOLIC BLOOD PRESSURE: 128 MMHG

## 2018-01-01 VITALS
HEIGHT: 74 IN | SYSTOLIC BLOOD PRESSURE: 130 MMHG | WEIGHT: 199 LBS | BODY MASS INDEX: 25.54 KG/M2 | DIASTOLIC BLOOD PRESSURE: 72 MMHG

## 2018-01-01 VITALS
HEIGHT: 74 IN | BODY MASS INDEX: 18.1 KG/M2 | WEIGHT: 141 LBS | DIASTOLIC BLOOD PRESSURE: 62 MMHG | RESPIRATION RATE: 18 BRPM | OXYGEN SATURATION: 98 % | SYSTOLIC BLOOD PRESSURE: 98 MMHG | HEART RATE: 82 BPM | TEMPERATURE: 98.1 F

## 2018-01-01 VITALS
OXYGEN SATURATION: 92 % | DIASTOLIC BLOOD PRESSURE: 63 MMHG | HEART RATE: 75 BPM | RESPIRATION RATE: 16 BRPM | TEMPERATURE: 96.9 F | HEIGHT: 71 IN | WEIGHT: 151.4 LBS | SYSTOLIC BLOOD PRESSURE: 95 MMHG | BODY MASS INDEX: 21.19 KG/M2

## 2018-01-01 VITALS
SYSTOLIC BLOOD PRESSURE: 106 MMHG | WEIGHT: 140.8 LBS | RESPIRATION RATE: 16 BRPM | DIASTOLIC BLOOD PRESSURE: 67 MMHG | TEMPERATURE: 100.3 F | HEART RATE: 103 BPM | BODY MASS INDEX: 18.07 KG/M2 | HEIGHT: 74 IN | OXYGEN SATURATION: 100 %

## 2018-01-01 VITALS
TEMPERATURE: 97.7 F | HEART RATE: 68 BPM | OXYGEN SATURATION: 100 % | WEIGHT: 147 LBS | SYSTOLIC BLOOD PRESSURE: 116 MMHG | BODY MASS INDEX: 18.87 KG/M2 | DIASTOLIC BLOOD PRESSURE: 66 MMHG | HEIGHT: 74 IN | RESPIRATION RATE: 18 BRPM

## 2018-01-01 VITALS
HEART RATE: 88 BPM | DIASTOLIC BLOOD PRESSURE: 72 MMHG | TEMPERATURE: 98 F | SYSTOLIC BLOOD PRESSURE: 138 MMHG | OXYGEN SATURATION: 100 % | RESPIRATION RATE: 18 BRPM

## 2018-01-01 VITALS
WEIGHT: 165.4 LBS | DIASTOLIC BLOOD PRESSURE: 60 MMHG | SYSTOLIC BLOOD PRESSURE: 124 MMHG | HEART RATE: 80 BPM | BODY MASS INDEX: 21.23 KG/M2 | HEIGHT: 74 IN

## 2018-01-01 VITALS
OXYGEN SATURATION: 100 % | DIASTOLIC BLOOD PRESSURE: 64 MMHG | TEMPERATURE: 98.6 F | RESPIRATION RATE: 18 BRPM | HEART RATE: 75 BPM | SYSTOLIC BLOOD PRESSURE: 114 MMHG | HEIGHT: 74 IN

## 2018-01-01 VITALS
HEART RATE: 70 BPM | DIASTOLIC BLOOD PRESSURE: 74 MMHG | SYSTOLIC BLOOD PRESSURE: 110 MMHG | TEMPERATURE: 97.9 F | RESPIRATION RATE: 20 BRPM | OXYGEN SATURATION: 100 %

## 2018-01-01 VITALS
OXYGEN SATURATION: 99 % | DIASTOLIC BLOOD PRESSURE: 69 MMHG | HEART RATE: 80 BPM | SYSTOLIC BLOOD PRESSURE: 107 MMHG | RESPIRATION RATE: 18 BRPM | TEMPERATURE: 98.6 F

## 2018-01-01 VITALS
BODY MASS INDEX: 18.46 KG/M2 | WEIGHT: 143.8 LBS | TEMPERATURE: 98.1 F | SYSTOLIC BLOOD PRESSURE: 135 MMHG | DIASTOLIC BLOOD PRESSURE: 73 MMHG | RESPIRATION RATE: 16 BRPM | OXYGEN SATURATION: 98 % | HEART RATE: 79 BPM | HEIGHT: 74 IN

## 2018-01-01 VITALS
SYSTOLIC BLOOD PRESSURE: 136 MMHG | HEIGHT: 73 IN | DIASTOLIC BLOOD PRESSURE: 82 MMHG | BODY MASS INDEX: 24.69 KG/M2 | WEIGHT: 186.3 LBS

## 2018-01-01 VITALS
SYSTOLIC BLOOD PRESSURE: 110 MMHG | RESPIRATION RATE: 18 BRPM | TEMPERATURE: 98.6 F | HEART RATE: 66 BPM | DIASTOLIC BLOOD PRESSURE: 62 MMHG | OXYGEN SATURATION: 100 %

## 2018-01-01 DIAGNOSIS — R03.0 ELEVATED BLOOD PRESSURE READING: ICD-10-CM

## 2018-01-01 DIAGNOSIS — E05.00 GRAVES' DISEASE: ICD-10-CM

## 2018-01-01 DIAGNOSIS — C79.51 METASTATIC CANCER TO SPINE (HCC): ICD-10-CM

## 2018-01-01 DIAGNOSIS — R59.9 ADENOPATHY: ICD-10-CM

## 2018-01-01 DIAGNOSIS — C61 CA OF PROSTATE (HCC): Primary | ICD-10-CM

## 2018-01-01 DIAGNOSIS — C79.51 BONE METASTASES (HCC): ICD-10-CM

## 2018-01-01 DIAGNOSIS — C79.51 CANCER, METASTATIC TO BONE (HCC): ICD-10-CM

## 2018-01-01 DIAGNOSIS — C61 PROSTATE CANCER (HCC): ICD-10-CM

## 2018-01-01 DIAGNOSIS — C79.52 SECONDARY MALIGNANT NEOPLASM OF BONE AND BONE MARROW (HCC): ICD-10-CM

## 2018-01-01 DIAGNOSIS — K59.00 CONSTIPATION, UNSPECIFIED CONSTIPATION TYPE: ICD-10-CM

## 2018-01-01 DIAGNOSIS — C61 PROSTATE CANCER (HCC): Primary | ICD-10-CM

## 2018-01-01 DIAGNOSIS — R97.20 ELEVATED PSA: ICD-10-CM

## 2018-01-01 DIAGNOSIS — C79.9 METASTATIC CANCER (HCC): ICD-10-CM

## 2018-01-01 DIAGNOSIS — E79.0 HYPERURICEMIA: ICD-10-CM

## 2018-01-01 DIAGNOSIS — N50.819 PAIN IN TESTIS: ICD-10-CM

## 2018-01-01 DIAGNOSIS — C78.7 PROSTATE CANCER METASTATIC TO LIVER (HCC): ICD-10-CM

## 2018-01-01 DIAGNOSIS — C79.51 SECONDARY MALIGNANT NEOPLASM OF BONE AND BONE MARROW (HCC): ICD-10-CM

## 2018-01-01 DIAGNOSIS — C79.51 CANCER, METASTATIC TO BONE (HCC): Primary | ICD-10-CM

## 2018-01-01 DIAGNOSIS — D64.81 ANEMIA DUE TO ANTINEOPLASTIC CHEMOTHERAPY: ICD-10-CM

## 2018-01-01 DIAGNOSIS — G89.3 CANCER RELATED PAIN: ICD-10-CM

## 2018-01-01 DIAGNOSIS — C61 CA PROSTATE, ADENOCA (HCC): ICD-10-CM

## 2018-01-01 DIAGNOSIS — E43 SEVERE MALNUTRITION (HCC): ICD-10-CM

## 2018-01-01 DIAGNOSIS — C61 CA OF PROSTATE (HCC): ICD-10-CM

## 2018-01-01 DIAGNOSIS — D50.9 MICROCYTIC ANEMIA: ICD-10-CM

## 2018-01-01 DIAGNOSIS — C61 CANCER OF PROSTATE (HCC): Primary | ICD-10-CM

## 2018-01-01 DIAGNOSIS — R11.0 NAUSEA: ICD-10-CM

## 2018-01-01 DIAGNOSIS — T45.1X5A ANEMIA DUE TO ANTINEOPLASTIC CHEMOTHERAPY: ICD-10-CM

## 2018-01-01 DIAGNOSIS — T67.6XXA: Primary | ICD-10-CM

## 2018-01-01 DIAGNOSIS — R19.7 DIARRHEA, UNSPECIFIED TYPE: ICD-10-CM

## 2018-01-01 DIAGNOSIS — M79.605 PAIN OF LEFT LOWER EXTREMITY: ICD-10-CM

## 2018-01-01 DIAGNOSIS — D64.9 ANEMIA, UNSPECIFIED TYPE: ICD-10-CM

## 2018-01-01 DIAGNOSIS — C78.7 PROSTATE CANCER METASTATIC TO LIVER (HCC): Primary | ICD-10-CM

## 2018-01-01 DIAGNOSIS — C61 PROSTATE CANCER METASTATIC TO LIVER (HCC): ICD-10-CM

## 2018-01-01 DIAGNOSIS — R11.11 INTRACTABLE VOMITING WITHOUT NAUSEA, UNSPECIFIED VOMITING TYPE: ICD-10-CM

## 2018-01-01 DIAGNOSIS — C61 MALIGNANT NEOPLASM OF PROSTATE (HCC): ICD-10-CM

## 2018-01-01 DIAGNOSIS — K59.03 DRUG-INDUCED CONSTIPATION: Primary | ICD-10-CM

## 2018-01-01 DIAGNOSIS — K12.31 ORAL MUCOSITIS (ULCERATIVE) DUE TO ANTINEOPLASTIC THERAPY: ICD-10-CM

## 2018-01-01 DIAGNOSIS — K59.03 DRUG-INDUCED CONSTIPATION: ICD-10-CM

## 2018-01-01 DIAGNOSIS — R04.0 EPISTAXIS: Primary | ICD-10-CM

## 2018-01-01 DIAGNOSIS — C61 PROSTATE CANCER METASTATIC TO LIVER (HCC): Primary | ICD-10-CM

## 2018-01-01 DIAGNOSIS — R50.81 NEUTROPENIC FEVER (HCC): Primary | ICD-10-CM

## 2018-01-01 DIAGNOSIS — R77.8 ELEVATED TROPONIN: ICD-10-CM

## 2018-01-01 DIAGNOSIS — K59.09 OTHER CONSTIPATION: ICD-10-CM

## 2018-01-01 DIAGNOSIS — R62.7 FAILURE TO THRIVE IN ADULT: ICD-10-CM

## 2018-01-01 DIAGNOSIS — Z51.11 ENCOUNTER FOR CHEMOTHERAPY MANAGEMENT: Primary | ICD-10-CM

## 2018-01-01 DIAGNOSIS — R60.0 BILATERAL LEG EDEMA: Primary | ICD-10-CM

## 2018-01-01 DIAGNOSIS — E86.0 DEHYDRATION: ICD-10-CM

## 2018-01-01 DIAGNOSIS — N39.0 URINARY TRACT INFECTION WITHOUT HEMATURIA, SITE UNSPECIFIED: ICD-10-CM

## 2018-01-01 DIAGNOSIS — C78.7 LIVER METASTASIS (HCC): Primary | ICD-10-CM

## 2018-01-01 DIAGNOSIS — Z51.11 ENCOUNTER FOR CHEMOTHERAPY MANAGEMENT: ICD-10-CM

## 2018-01-01 DIAGNOSIS — C61 PROSTATE CANCER METASTATIC TO BONE (HCC): Primary | ICD-10-CM

## 2018-01-01 DIAGNOSIS — Z87.891 EX-SMOKER FOR MORE THAN 1 YEAR: ICD-10-CM

## 2018-01-01 DIAGNOSIS — R41.0 TRANSIENT DISORIENTATION: Primary | ICD-10-CM

## 2018-01-01 DIAGNOSIS — C79.9 METASTATIC CANCER (HCC): Primary | ICD-10-CM

## 2018-01-01 DIAGNOSIS — C79.51 PROSTATE CANCER METASTATIC TO BONE (HCC): ICD-10-CM

## 2018-01-01 DIAGNOSIS — I63.9 CEREBROVASCULAR ACCIDENT (CVA), UNSPECIFIED MECHANISM (HCC): Primary | ICD-10-CM

## 2018-01-01 DIAGNOSIS — N39.0 URINARY TRACT INFECTION WITH HEMATURIA, SITE UNSPECIFIED: ICD-10-CM

## 2018-01-01 DIAGNOSIS — C61 PROSTATE CANCER METASTATIC TO BONE (HCC): ICD-10-CM

## 2018-01-01 DIAGNOSIS — G89.3 CANCER RELATED PAIN: Primary | ICD-10-CM

## 2018-01-01 DIAGNOSIS — D70.9 NEUTROPENIC FEVER (HCC): Primary | ICD-10-CM

## 2018-01-01 DIAGNOSIS — C61 CA PROSTATE, ADENOCA (HCC): Primary | ICD-10-CM

## 2018-01-01 DIAGNOSIS — C79.51 PROSTATE CANCER METASTATIC TO BONE (HCC): Primary | ICD-10-CM

## 2018-01-01 DIAGNOSIS — E78.00 PURE HYPERCHOLESTEROLEMIA: ICD-10-CM

## 2018-01-01 DIAGNOSIS — R10.9 ABDOMINAL PAIN, UNSPECIFIED ABDOMINAL LOCATION: Primary | ICD-10-CM

## 2018-01-01 DIAGNOSIS — M85.80 OSTEOPENIA DUE TO CANCER THERAPY: ICD-10-CM

## 2018-01-01 DIAGNOSIS — R54 FRAIL ELDERLY: ICD-10-CM

## 2018-01-01 DIAGNOSIS — R31.9 URINARY TRACT INFECTION WITH HEMATURIA, SITE UNSPECIFIED: ICD-10-CM

## 2018-01-01 DIAGNOSIS — K56.600 PARTIAL SMALL BOWEL OBSTRUCTION (HCC): Primary | ICD-10-CM

## 2018-01-01 DIAGNOSIS — R07.89 CHEST WALL PAIN: Primary | ICD-10-CM

## 2018-01-01 DIAGNOSIS — M79.674 PAIN IN TOES OF BOTH FEET: Primary | ICD-10-CM

## 2018-01-01 DIAGNOSIS — N30.00 ACUTE CYSTITIS WITHOUT HEMATURIA: ICD-10-CM

## 2018-01-01 DIAGNOSIS — E43 SEVERE PROTEIN-CALORIE MALNUTRITION (HCC): ICD-10-CM

## 2018-01-01 DIAGNOSIS — R79.89 ELEVATED TSH: ICD-10-CM

## 2018-01-01 DIAGNOSIS — R06.02 SOB (SHORTNESS OF BREATH) ON EXERTION: ICD-10-CM

## 2018-01-01 DIAGNOSIS — K40.90 INGUINAL HERNIA WITHOUT OBSTRUCTION OR GANGRENE, RECURRENCE NOT SPECIFIED, UNSPECIFIED LATERALITY: Primary | ICD-10-CM

## 2018-01-01 DIAGNOSIS — K40.90 RIGHT INGUINAL HERNIA: ICD-10-CM

## 2018-01-01 DIAGNOSIS — G89.3 CANCER ASSOCIATED PAIN: Primary | ICD-10-CM

## 2018-01-01 DIAGNOSIS — E87.6 HYPOKALEMIA: ICD-10-CM

## 2018-01-01 DIAGNOSIS — C41.9 METASTATIC BONE CANCER (HCC): ICD-10-CM

## 2018-01-01 DIAGNOSIS — C61 MALIGNANT NEOPLASM OF PROSTATE (HCC): Primary | ICD-10-CM

## 2018-01-01 DIAGNOSIS — Z85.46 HISTORY OF PROSTATE CANCER: ICD-10-CM

## 2018-01-01 DIAGNOSIS — R06.02 SHORTNESS OF BREATH: Primary | ICD-10-CM

## 2018-01-01 DIAGNOSIS — R11.2 NON-INTRACTABLE VOMITING WITH NAUSEA, UNSPECIFIED VOMITING TYPE: Primary | ICD-10-CM

## 2018-01-01 DIAGNOSIS — R60.1 ANASARCA: ICD-10-CM

## 2018-01-01 DIAGNOSIS — M79.675 PAIN IN TOES OF BOTH FEET: Primary | ICD-10-CM

## 2018-01-01 DIAGNOSIS — N18.1 CHRONIC KIDNEY DISEASE, STAGE I: Primary | ICD-10-CM

## 2018-01-01 LAB
ABO: NORMAL
ALBUMIN SERPL-MCNC: 2.9 G/DL (ref 3.5–5.1)
ALBUMIN SERPL-MCNC: 3.5 G/DL (ref 3.5–5.1)
ALBUMIN SERPL-MCNC: 3.6 G/DL (ref 3.5–5.1)
ALBUMIN SERPL-MCNC: 3.8 G/DL (ref 3.5–5.1)
ALBUMIN SERPL-MCNC: 3.8 G/DL (ref 3.5–5.1)
ALBUMIN SERPL-MCNC: 3.9 G/DL (ref 3.5–5.1)
ALBUMIN SERPL-MCNC: 4 G/DL (ref 3.5–5.1)
ALBUMIN SERPL-MCNC: 4 G/DL (ref 3.5–5.1)
ALBUMIN SERPL-MCNC: 4.1 G/DL (ref 3.5–5.1)
ALBUMIN SERPL-MCNC: 4.6 G/DL
ALP BLD-CCNC: 150 U/L (ref 38–126)
ALP BLD-CCNC: 176 U/L (ref 38–126)
ALP BLD-CCNC: 202 U/L (ref 38–126)
ALP BLD-CCNC: 203 U/L (ref 38–126)
ALP BLD-CCNC: 227 U/L
ALP BLD-CCNC: 229 U/L (ref 38–126)
ALP BLD-CCNC: 240 U/L (ref 38–126)
ALP BLD-CCNC: 241 U/L (ref 38–126)
ALP BLD-CCNC: 265 U/L (ref 38–126)
ALP BLD-CCNC: 269 U/L (ref 38–126)
ALP BLD-CCNC: 275 U/L (ref 38–126)
ALP BLD-CCNC: 280 U/L (ref 38–126)
ALP BLD-CCNC: 280 U/L (ref 38–126)
ALP BLD-CCNC: 283 U/L (ref 38–126)
ALP BLD-CCNC: 287 U/L (ref 38–126)
ALP BLD-CCNC: 300 U/L (ref 38–126)
ALP BLD-CCNC: 304 U/L (ref 38–126)
ALP BLD-CCNC: 310 U/L (ref 38–126)
ALP BLD-CCNC: 334 U/L (ref 38–126)
ALP BLD-CCNC: 346 U/L (ref 38–126)
ALP BLD-CCNC: 348 U/L (ref 38–126)
ALP BLD-CCNC: 379 U/L (ref 38–126)
ALT SERPL-CCNC: 10 U/L (ref 11–66)
ALT SERPL-CCNC: 12 U/L (ref 11–66)
ALT SERPL-CCNC: 13 U/L (ref 11–66)
ALT SERPL-CCNC: 15 U/L (ref 11–66)
ALT SERPL-CCNC: 17 U/L (ref 11–66)
ALT SERPL-CCNC: 19 U/L (ref 11–66)
ALT SERPL-CCNC: 22 U/L (ref 11–66)
ALT SERPL-CCNC: 29 U/L (ref 11–66)
ALT SERPL-CCNC: 36 U/L (ref 11–66)
ALT SERPL-CCNC: 36 U/L (ref 11–66)
ALT SERPL-CCNC: 42 U/L (ref 11–66)
ALT SERPL-CCNC: 44 U/L (ref 11–66)
ALT SERPL-CCNC: 7 U/L (ref 11–66)
ALT SERPL-CCNC: 9 U/L
ALT SERPL-CCNC: 9 U/L (ref 11–66)
ALT SERPL-CCNC: 9 U/L (ref 11–66)
ALT SERPL-CCNC: < 5 U/L (ref 11–66)
ALT SERPL-CCNC: < 5 U/L (ref 11–66)
AMMONIA: 47 UMOL/L (ref 11–60)
AMORPHOUS: ABNORMAL
AMPHETAMINE+METHAMPHETAMINE URINE SCREEN: NEGATIVE
AMPHETAMINE+METHAMPHETAMINE URINE SCREEN: NEGATIVE
ANION GAP SERPL CALCULATED.3IONS-SCNC: 10 MEQ/L (ref 8–16)
ANION GAP SERPL CALCULATED.3IONS-SCNC: 11 MEQ/L (ref 8–16)
ANION GAP SERPL CALCULATED.3IONS-SCNC: 11 MEQ/L (ref 8–16)
ANION GAP SERPL CALCULATED.3IONS-SCNC: 12 MEQ/L (ref 8–16)
ANION GAP SERPL CALCULATED.3IONS-SCNC: 12 MEQ/L (ref 8–16)
ANION GAP SERPL CALCULATED.3IONS-SCNC: 13 MEQ/L (ref 8–16)
ANION GAP SERPL CALCULATED.3IONS-SCNC: 14 MEQ/L (ref 8–16)
ANION GAP SERPL CALCULATED.3IONS-SCNC: 14 MMOL/L
ANION GAP SERPL CALCULATED.3IONS-SCNC: 15 MEQ/L (ref 8–16)
ANION GAP SERPL CALCULATED.3IONS-SCNC: 16 MEQ/L (ref 8–16)
ANION GAP SERPL CALCULATED.3IONS-SCNC: 17 MEQ/L (ref 8–16)
ANION GAP SERPL CALCULATED.3IONS-SCNC: 20 MEQ/L (ref 8–16)
ANISOCYTOSIS: ABNORMAL
ANISOCYTOSIS: PRESENT
ANTIBODY SCREEN: NORMAL
APTT: 26.2 SECONDS (ref 22–38)
APTT: 31.5 SECONDS (ref 22–38)
APTT: 33.1 SECONDS (ref 22–38)
AST SERPL-CCNC: 103 U/L (ref 5–40)
AST SERPL-CCNC: 121 U/L (ref 5–40)
AST SERPL-CCNC: 40 U/L (ref 5–40)
AST SERPL-CCNC: 41 U/L (ref 5–40)
AST SERPL-CCNC: 43 U/L (ref 5–40)
AST SERPL-CCNC: 44 U/L (ref 5–40)
AST SERPL-CCNC: 46 U/L (ref 5–40)
AST SERPL-CCNC: 48 U/L (ref 5–40)
AST SERPL-CCNC: 48 U/L (ref 5–40)
AST SERPL-CCNC: 54 U/L
AST SERPL-CCNC: 54 U/L (ref 5–40)
AST SERPL-CCNC: 56 U/L (ref 5–40)
AST SERPL-CCNC: 59 U/L (ref 5–40)
AST SERPL-CCNC: 65 U/L (ref 5–40)
AST SERPL-CCNC: 66 U/L (ref 5–40)
AST SERPL-CCNC: 68 U/L (ref 5–40)
AST SERPL-CCNC: 69 U/L (ref 5–40)
AST SERPL-CCNC: 70 U/L (ref 5–40)
AST SERPL-CCNC: 71 U/L (ref 5–40)
AST SERPL-CCNC: 72 U/L (ref 5–40)
AST SERPL-CCNC: 93 U/L (ref 5–40)
AST SERPL-CCNC: 96 U/L (ref 5–40)
ATYPICAL LYMPHOCYTES: ABNORMAL %
ATYPICAL LYMPHOCYTES: NORMAL %
BACTERIA: ABNORMAL
BACTERIA: ABNORMAL /HPF
BACTERIA: ABNORMAL /HPF
BACTERIA: NORMAL /HPF
BACTERIA: NORMAL /HPF
BARBITURATE QUANTITATIVE URINE: NEGATIVE
BARBITURATE QUANTITATIVE URINE: NEGATIVE
BASOPHILIA: ABNORMAL
BASOPHILIA: NORMAL
BASOPHILIC STIPPLING: ABNORMAL
BASOPHILIC STIPPLING: ABNORMAL
BASOPHILS # BLD: 0 %
BASOPHILS # BLD: 0.2 %
BASOPHILS # BLD: 0.3 %
BASOPHILS # BLD: 0.4 %
BASOPHILS # BLD: 0.5 %
BASOPHILS # BLD: 0.6 %
BASOPHILS # BLD: 0.7 %
BASOPHILS # BLD: 0.7 %
BASOPHILS # BLD: 0.8 %
BASOPHILS # BLD: 0.8 %
BASOPHILS # BLD: 0.9 %
BASOPHILS # BLD: 1 %
BASOPHILS ABSOLUTE: 0 /ΜL
BASOPHILS ABSOLUTE: 0 THOU/MM3 (ref 0–0.1)
BASOPHILS ABSOLUTE: 0.1 THOU/MM3 (ref 0–0.1)
BASOPHILS ABSOLUTE: 0.1 THOU/MM3 (ref 0–0.1)
BASOPHILS ABSOLUTE: 0.2 THOU/MM3 (ref 0–0.1)
BASOPHILS RELATIVE PERCENT: 0.1 %
BENZODIAZEPINE QUANTITATIVE URINE: NEGATIVE
BENZODIAZEPINE QUANTITATIVE URINE: NEGATIVE
BILIRUB SERPL-MCNC: 0.3 MG/DL (ref 0.3–1.2)
BILIRUB SERPL-MCNC: 0.4 MG/DL (ref 0.3–1.2)
BILIRUB SERPL-MCNC: 0.5 MG/DL (ref 0.3–1.2)
BILIRUB SERPL-MCNC: 0.6 MG/DL (ref 0.3–1.2)
BILIRUB SERPL-MCNC: 0.6 MG/DL (ref 0.3–1.2)
BILIRUB SERPL-MCNC: 0.9 MG/DL (ref 0.1–1.4)
BILIRUB SERPL-MCNC: 1 MG/DL (ref 0.3–1.2)
BILIRUBIN DIRECT: 0.3 MG/DL (ref 0–0.3)
BILIRUBIN DIRECT: < 0.2 MG/DL (ref 0–0.3)
BILIRUBIN URINE: ABNORMAL
BILIRUBIN URINE: NEGATIVE
BILIRUBIN URINE: NORMAL MG/DL
BLASTS: 0 %
BLASTS: 1 %
BLOOD CULTURE, ROUTINE: NORMAL
BLOOD URINE, POC: ABNORMAL
BLOOD URINE, POC: ABNORMAL
BLOOD URINE, POC: NEGATIVE
BLOOD URINE, POC: NEGATIVE
BLOOD, URINE: ABNORMAL
BLOOD, URINE: NEGATIVE
BUN BLDV-MCNC: 10 MG/DL (ref 7–22)
BUN BLDV-MCNC: 11 MG/DL (ref 7–22)
BUN BLDV-MCNC: 13 MG/DL (ref 7–22)
BUN BLDV-MCNC: 13 MG/DL (ref 7–22)
BUN BLDV-MCNC: 14 MG/DL (ref 7–22)
BUN BLDV-MCNC: 15 MG/DL (ref 7–22)
BUN BLDV-MCNC: 16 MG/DL (ref 7–22)
BUN BLDV-MCNC: 16 MG/DL (ref 7–22)
BUN BLDV-MCNC: 17 MG/DL
BUN BLDV-MCNC: 18 MG/DL (ref 7–22)
BUN BLDV-MCNC: 19 MG/DL (ref 7–22)
BUN BLDV-MCNC: 35 MG/DL (ref 7–22)
BUN BLDV-MCNC: 6 MG/DL (ref 7–22)
BUN BLDV-MCNC: 8 MG/DL (ref 7–22)
BUN BLDV-MCNC: 8 MG/DL (ref 7–22)
BUN BLDV-MCNC: 9 MG/DL (ref 7–22)
BUN, WHOLE BLOOD: 10 MG/DL (ref 8–26)
BUN, WHOLE BLOOD: 10 MG/DL (ref 8–26)
BUN, WHOLE BLOOD: 11 MG/DL (ref 8–26)
BUN, WHOLE BLOOD: 12 MG/DL (ref 8–26)
BUN, WHOLE BLOOD: 12 MG/DL (ref 8–26)
BUN, WHOLE BLOOD: 13 MG/DL (ref 8–26)
BUN, WHOLE BLOOD: 14 MG/DL (ref 8–26)
BUN, WHOLE BLOOD: 15 MG/DL (ref 8–26)
BUN, WHOLE BLOOD: 17 MG/DL (ref 8–26)
BUN, WHOLE BLOOD: 18 MG/DL (ref 8–26)
BUN, WHOLE BLOOD: 23 MG/DL (ref 8–26)
BUN, WHOLE BLOOD: 25 MG/DL (ref 8–26)
CALCIUM IONIZED: 1 MMOL/L (ref 1.12–1.32)
CALCIUM IONIZED: 1.09 MMOL/L (ref 1.12–1.32)
CALCIUM SERPL-MCNC: 6.9 MG/DL (ref 8.5–10.5)
CALCIUM SERPL-MCNC: 7.4 MG/DL (ref 8.5–10.5)
CALCIUM SERPL-MCNC: 7.5 MG/DL (ref 8.5–10.5)
CALCIUM SERPL-MCNC: 7.6 MG/DL (ref 8.5–10.5)
CALCIUM SERPL-MCNC: 7.8 MG/DL (ref 8.5–10.5)
CALCIUM SERPL-MCNC: 7.9 MG/DL (ref 8.5–10.5)
CALCIUM SERPL-MCNC: 8 MG/DL (ref 8.5–10.5)
CALCIUM SERPL-MCNC: 8.1 MG/DL (ref 8.5–10.5)
CALCIUM SERPL-MCNC: 8.2 MG/DL (ref 8.5–10.5)
CALCIUM SERPL-MCNC: 8.2 MG/DL (ref 8.5–10.5)
CALCIUM SERPL-MCNC: 8.3 MG/DL (ref 8.5–10.5)
CALCIUM SERPL-MCNC: 8.3 MG/DL (ref 8.5–10.5)
CALCIUM SERPL-MCNC: 8.4 MG/DL
CALCIUM SERPL-MCNC: 8.4 MG/DL (ref 8.5–10.5)
CALCIUM SERPL-MCNC: 8.5 MG/DL (ref 8.5–10.5)
CALCIUM SERPL-MCNC: 8.5 MG/DL (ref 8.5–10.5)
CANNABINOID QUANTITATIVE URINE: NEGATIVE
CANNABINOID QUANTITATIVE URINE: NEGATIVE
CASTS 2: ABNORMAL /LPF
CASTS 2: ABNORMAL /LPF
CASTS 2: NORMAL /LPF
CASTS 2: NORMAL /LPF
CASTS UA: ABNORMAL /LPF
CASTS UA: ABNORMAL /LPF
CASTS UA: NORMAL /LPF
CASTS UA: NORMAL /LPF
CASTS: ABNORMAL /LPF
CASTS: ABNORMAL /LPF
CHARACTER, URINE: ABNORMAL
CHARACTER, URINE: ABNORMAL
CHARACTER, URINE: CLEAR
CHARACTER, URINE: NORMAL
CHLORIDE BLD-SCNC: 100 MEQ/L (ref 98–111)
CHLORIDE BLD-SCNC: 102 MEQ/L (ref 98–111)
CHLORIDE BLD-SCNC: 103 MEQ/L (ref 98–111)
CHLORIDE BLD-SCNC: 103 MEQ/L (ref 98–111)
CHLORIDE BLD-SCNC: 104 MEQ/L (ref 98–111)
CHLORIDE BLD-SCNC: 105 MEQ/L (ref 98–111)
CHLORIDE BLD-SCNC: 94 MEQ/L (ref 98–111)
CHLORIDE BLD-SCNC: 95 MEQ/L (ref 98–111)
CHLORIDE BLD-SCNC: 96 MEQ/L (ref 98–111)
CHLORIDE BLD-SCNC: 97 MEQ/L (ref 98–111)
CHLORIDE BLD-SCNC: 98 MEQ/L (ref 98–111)
CHLORIDE BLD-SCNC: 99 MEQ/L (ref 98–111)
CHLORIDE BLD-SCNC: 99 MMOL/L
CHLORIDE, WHOLE BLOOD: 100 MEQ/L (ref 98–109)
CHLORIDE, WHOLE BLOOD: 100 MEQ/L (ref 98–109)
CHLORIDE, WHOLE BLOOD: 101 MEQ/L (ref 98–109)
CHLORIDE, WHOLE BLOOD: 101 MEQ/L (ref 98–109)
CHLORIDE, WHOLE BLOOD: 102 MEQ/L (ref 98–109)
CHLORIDE, WHOLE BLOOD: 90 MEQ/L (ref 98–109)
CHLORIDE, WHOLE BLOOD: 94 MEQ/L (ref 98–109)
CHLORIDE, WHOLE BLOOD: 97 MEQ/L (ref 98–109)
CHLORIDE, WHOLE BLOOD: 98 MEQ/L (ref 98–109)
CHLORIDE, WHOLE BLOOD: 98 MEQ/L (ref 98–109)
CHLORIDE, WHOLE BLOOD: 99 MEQ/L (ref 98–109)
CHLORIDE, WHOLE BLOOD: 99 MEQ/L (ref 98–109)
CHOLESTEROL, TOTAL: 130 MG/DL (ref 100–199)
CLARITY: NORMAL
CLOSTRIDIUM DIFFICILE, PCR: NEGATIVE
CO2: 22 MEQ/L (ref 23–33)
CO2: 23 MEQ/L (ref 23–33)
CO2: 24 MEQ/L (ref 23–33)
CO2: 24 MMOL/L
CO2: 25 MEQ/L (ref 23–33)
CO2: 26 MEQ/L (ref 23–33)
CO2: 26 MEQ/L (ref 23–33)
COCAINE METABOLITE QUANTITATIVE URINE: NEGATIVE
COCAINE METABOLITE QUANTITATIVE URINE: NEGATIVE
COLOR, URINE: YELLOW
COLOR: ABNORMAL
COLOR: NORMAL
COLOR: YELLOW
CREAT SERPL-MCNC: 0.4 MG/DL (ref 0.4–1.2)
CREAT SERPL-MCNC: 0.5 MG/DL (ref 0.4–1.2)
CREAT SERPL-MCNC: 0.6 MG/DL (ref 0.4–1.2)
CREAT SERPL-MCNC: 0.8 MG/DL (ref 0.4–1.2)
CREAT SERPL-MCNC: 0.93 MG/DL
CREAT SERPL-MCNC: 1.1 MG/DL (ref 0.4–1.2)
CREAT SERPL-MCNC: 1.2 MG/DL (ref 0.4–1.2)
CREATININE, WHOLE BLOOD: 0.4 MG/DL (ref 0.5–1.2)
CREATININE, WHOLE BLOOD: 0.5 MG/DL (ref 0.5–1.2)
CREATININE, WHOLE BLOOD: 0.6 MG/DL (ref 0.5–1.2)
CREATININE, WHOLE BLOOD: 0.7 MG/DL (ref 0.5–1.2)
CREATININE, WHOLE BLOOD: 0.7 MG/DL (ref 0.5–1.2)
CREATININE, WHOLE BLOOD: 1 MG/DL (ref 0.5–1.2)
CREATININE, WHOLE BLOOD: 1.1 MG/DL (ref 0.5–1.2)
CRENATED RBC'S: ABNORMAL
CRENATED RBC'S: NORMAL
CRYSTALS, UA: ABNORMAL
CRYSTALS, UA: ABNORMAL
CRYSTALS, UA: NORMAL
CRYSTALS, UA: NORMAL
CRYSTALS: ABNORMAL
DACROCYTES: ABNORMAL
DIFFERENTIAL TYPE: ABNORMAL
EKG ATRIAL RATE: 107 BPM
EKG ATRIAL RATE: 63 BPM
EKG ATRIAL RATE: 65 BPM
EKG ATRIAL RATE: 71 BPM
EKG ATRIAL RATE: 74 BPM
EKG ATRIAL RATE: 89 BPM
EKG ATRIAL RATE: 92 BPM
EKG P AXIS: 45 DEGREES
EKG P AXIS: 47 DEGREES
EKG P AXIS: 54 DEGREES
EKG P AXIS: 59 DEGREES
EKG P AXIS: 63 DEGREES
EKG P AXIS: 78 DEGREES
EKG P AXIS: 99 DEGREES
EKG P-R INTERVAL: 172 MS
EKG P-R INTERVAL: 182 MS
EKG P-R INTERVAL: 186 MS
EKG P-R INTERVAL: 186 MS
EKG P-R INTERVAL: 192 MS
EKG P-R INTERVAL: 192 MS
EKG P-R INTERVAL: 212 MS
EKG Q-T INTERVAL: 362 MS
EKG Q-T INTERVAL: 366 MS
EKG Q-T INTERVAL: 374 MS
EKG Q-T INTERVAL: 376 MS
EKG Q-T INTERVAL: 382 MS
EKG Q-T INTERVAL: 418 MS
EKG Q-T INTERVAL: 430 MS
EKG QRS DURATION: 84 MS
EKG QRS DURATION: 86 MS
EKG QRS DURATION: 92 MS
EKG QRS DURATION: 94 MS
EKG QRS DURATION: 94 MS
EKG QRS DURATION: 96 MS
EKG QRS DURATION: 96 MS
EKG QTC CALCULATION (BAZETT): 397 MS
EKG QTC CALCULATION (BAZETT): 401 MS
EKG QTC CALCULATION (BAZETT): 440 MS
EKG QTC CALCULATION (BAZETT): 452 MS
EKG QTC CALCULATION (BAZETT): 454 MS
EKG QTC CALCULATION (BAZETT): 457 MS
EKG QTC CALCULATION (BAZETT): 499 MS
EKG R AXIS: 17 DEGREES
EKG R AXIS: 23 DEGREES
EKG R AXIS: 28 DEGREES
EKG R AXIS: 3 DEGREES
EKG R AXIS: 37 DEGREES
EKG R AXIS: 37 DEGREES
EKG R AXIS: 4 DEGREES
EKG T AXIS: 15 DEGREES
EKG T AXIS: 39 DEGREES
EKG T AXIS: 47 DEGREES
EKG T AXIS: 52 DEGREES
EKG T AXIS: 54 DEGREES
EKG T AXIS: 68 DEGREES
EKG T AXIS: 83 DEGREES
EKG VENTRICULAR RATE: 107 BPM
EKG VENTRICULAR RATE: 63 BPM
EKG VENTRICULAR RATE: 65 BPM
EKG VENTRICULAR RATE: 71 BPM
EKG VENTRICULAR RATE: 74 BPM
EKG VENTRICULAR RATE: 89 BPM
EKG VENTRICULAR RATE: 92 BPM
ELLIPTOCYTES: ABNORMAL
EOSINOPHIL # BLD: 0 %
EOSINOPHIL # BLD: 0.2 %
EOSINOPHIL # BLD: 0.2 %
EOSINOPHIL # BLD: 0.3 %
EOSINOPHIL # BLD: 0.4 %
EOSINOPHIL # BLD: 0.4 %
EOSINOPHIL # BLD: 0.7 %
EOSINOPHIL # BLD: 1 %
EOSINOPHILS ABSOLUTE: 0 /ΜL
EOSINOPHILS ABSOLUTE: 0 THOU/MM3 (ref 0–0.4)
EOSINOPHILS ABSOLUTE: 0.1 THOU/MM3 (ref 0–0.4)
EOSINOPHILS RELATIVE PERCENT: 0.2 %
EPITHELIAL CELLS, UA: ABNORMAL /HPF
EPITHELIAL CELLS, UA: NORMAL /HPF
EPITHELIAL CELLS, UA: NORMAL /HPF
ERYTHROCYTE [DISTWIDTH] IN BLOOD BY AUTOMATED COUNT: 18.2 % (ref 11.5–14.5)
ERYTHROCYTE [DISTWIDTH] IN BLOOD BY AUTOMATED COUNT: 18.4 % (ref 11.5–14.5)
ERYTHROCYTE [DISTWIDTH] IN BLOOD BY AUTOMATED COUNT: 18.7 % (ref 11.5–14.5)
ERYTHROCYTE [DISTWIDTH] IN BLOOD BY AUTOMATED COUNT: 18.7 % (ref 11.5–14.5)
ERYTHROCYTE [DISTWIDTH] IN BLOOD BY AUTOMATED COUNT: 18.9 % (ref 11.5–14.5)
ERYTHROCYTE [DISTWIDTH] IN BLOOD BY AUTOMATED COUNT: 19 % (ref 11.5–14.5)
ERYTHROCYTE [DISTWIDTH] IN BLOOD BY AUTOMATED COUNT: 19.8 % (ref 11.5–14.5)
ERYTHROCYTE [DISTWIDTH] IN BLOOD BY AUTOMATED COUNT: 19.9 % (ref 11.5–14.5)
ERYTHROCYTE [DISTWIDTH] IN BLOOD BY AUTOMATED COUNT: 19.9 % (ref 11.5–14.5)
ERYTHROCYTE [DISTWIDTH] IN BLOOD BY AUTOMATED COUNT: 20 % (ref 11.5–14.5)
ERYTHROCYTE [DISTWIDTH] IN BLOOD BY AUTOMATED COUNT: 20.1 % (ref 11.5–14.5)
ERYTHROCYTE [DISTWIDTH] IN BLOOD BY AUTOMATED COUNT: 20.1 % (ref 11.5–14.5)
ERYTHROCYTE [DISTWIDTH] IN BLOOD BY AUTOMATED COUNT: 20.2 % (ref 11.5–14.5)
ERYTHROCYTE [DISTWIDTH] IN BLOOD BY AUTOMATED COUNT: 20.7 % (ref 11.5–14.5)
ERYTHROCYTE [DISTWIDTH] IN BLOOD BY AUTOMATED COUNT: 20.8 % (ref 11.5–14.5)
ERYTHROCYTE [DISTWIDTH] IN BLOOD BY AUTOMATED COUNT: 21.2 % (ref 11.5–14.5)
ERYTHROCYTE [DISTWIDTH] IN BLOOD BY AUTOMATED COUNT: 21.3 % (ref 11.5–14.5)
ERYTHROCYTE [DISTWIDTH] IN BLOOD BY AUTOMATED COUNT: 21.9 % (ref 11.5–14.5)
ERYTHROCYTE [DISTWIDTH] IN BLOOD BY AUTOMATED COUNT: 22.1 % (ref 11.5–14.5)
ERYTHROCYTE [DISTWIDTH] IN BLOOD BY AUTOMATED COUNT: 23.6 % (ref 11.5–14.5)
ERYTHROCYTE [DISTWIDTH] IN BLOOD BY AUTOMATED COUNT: 55.3 FL (ref 35–45)
ERYTHROCYTE [DISTWIDTH] IN BLOOD BY AUTOMATED COUNT: 56.6 FL (ref 35–45)
ERYTHROCYTE [DISTWIDTH] IN BLOOD BY AUTOMATED COUNT: 57.6 FL (ref 35–45)
ERYTHROCYTE [DISTWIDTH] IN BLOOD BY AUTOMATED COUNT: 58.4 FL (ref 35–45)
ERYTHROCYTE [DISTWIDTH] IN BLOOD BY AUTOMATED COUNT: 58.7 FL (ref 35–45)
ERYTHROCYTE [DISTWIDTH] IN BLOOD BY AUTOMATED COUNT: 60 FL (ref 35–45)
ERYTHROCYTE [DISTWIDTH] IN BLOOD BY AUTOMATED COUNT: 60 FL (ref 35–45)
ERYTHROCYTE [DISTWIDTH] IN BLOOD BY AUTOMATED COUNT: 62.9 FL (ref 35–45)
ERYTHROCYTE [DISTWIDTH] IN BLOOD BY AUTOMATED COUNT: 63.5 FL (ref 35–45)
ERYTHROCYTE [DISTWIDTH] IN BLOOD BY AUTOMATED COUNT: 63.8 FL (ref 35–45)
ERYTHROCYTE [DISTWIDTH] IN BLOOD BY AUTOMATED COUNT: 63.9 FL (ref 35–45)
ERYTHROCYTE [DISTWIDTH] IN BLOOD BY AUTOMATED COUNT: 65.7 FL (ref 35–45)
ERYTHROCYTE [DISTWIDTH] IN BLOOD BY AUTOMATED COUNT: 66.9 FL (ref 35–45)
ERYTHROCYTE [DISTWIDTH] IN BLOOD BY AUTOMATED COUNT: 66.9 FL (ref 35–45)
ERYTHROCYTE [DISTWIDTH] IN BLOOD BY AUTOMATED COUNT: 69.1 FL (ref 35–45)
ERYTHROCYTE [DISTWIDTH] IN BLOOD BY AUTOMATED COUNT: 73.1 FL (ref 35–45)
ERYTHROCYTE [DISTWIDTH] IN BLOOD BY AUTOMATED COUNT: 73.5 FL (ref 35–45)
ERYTHROCYTE [DISTWIDTH] IN BLOOD BY AUTOMATED COUNT: 74.1 FL (ref 35–45)
ERYTHROCYTE [DISTWIDTH] IN BLOOD BY AUTOMATED COUNT: 80.5 FL (ref 35–45)
ERYTHROCYTE [DISTWIDTH] IN BLOOD BY AUTOMATED COUNT: 82.6 FL (ref 35–45)
ETHYL ALCOHOL, SERUM: < 0.01 %
FERRITIN: 3948 NG/ML (ref 22–322)
FERRITIN: 7582 NG/ML (ref 22–322)
FLU A ANTIGEN: NEGATIVE
FLU B ANTIGEN: NEGATIVE
FOLATE: > 20 NG/ML (ref 4.8–24.2)
GFR CALCULATED: NORMAL
GFR SERPL CREATININE-BSD FRML MDRD: 73 ML/MIN/1.73M2
GFR SERPL CREATININE-BSD FRML MDRD: 81 ML/MIN/1.73M2
GFR SERPL CREATININE-BSD FRML MDRD: > 90 ML/MIN/1.73M2
GFR, ESTIMATED: 86 ML/MIN/1.73M2
GFR, ESTIMATED: > 90 ML/MIN/1.73M2
GLUCOSE BLD-MCNC: 101 MG/DL
GLUCOSE BLD-MCNC: 103 MG/DL (ref 70–108)
GLUCOSE BLD-MCNC: 104 MG/DL (ref 70–108)
GLUCOSE BLD-MCNC: 107 MG/DL (ref 70–108)
GLUCOSE BLD-MCNC: 107 MG/DL (ref 70–108)
GLUCOSE BLD-MCNC: 112 MG/DL (ref 70–108)
GLUCOSE BLD-MCNC: 120 MG/DL (ref 70–108)
GLUCOSE BLD-MCNC: 121 MG/DL (ref 70–108)
GLUCOSE BLD-MCNC: 125 MG/DL (ref 70–108)
GLUCOSE BLD-MCNC: 128 MG/DL (ref 70–108)
GLUCOSE BLD-MCNC: 129 MG/DL (ref 70–108)
GLUCOSE BLD-MCNC: 72 MG/DL (ref 70–108)
GLUCOSE BLD-MCNC: 72 MG/DL (ref 70–108)
GLUCOSE BLD-MCNC: 73 MG/DL (ref 70–108)
GLUCOSE BLD-MCNC: 75 MG/DL (ref 70–108)
GLUCOSE BLD-MCNC: 78 MG/DL (ref 70–108)
GLUCOSE BLD-MCNC: 79 MG/DL (ref 70–108)
GLUCOSE BLD-MCNC: 81 MG/DL (ref 70–108)
GLUCOSE BLD-MCNC: 87 MG/DL (ref 70–108)
GLUCOSE BLD-MCNC: 87 MG/DL (ref 70–108)
GLUCOSE BLD-MCNC: 95 MG/DL (ref 70–108)
GLUCOSE BLD-MCNC: 96 MG/DL (ref 70–108)
GLUCOSE BLD-MCNC: 99 MG/DL (ref 70–108)
GLUCOSE URINE: NEGATIVE
GLUCOSE URINE: NEGATIVE MG/DL
GLUCOSE, URINE: NEGATIVE MG/DL
GLUCOSE, WHOLE BLOOD: 102 MG/DL (ref 70–108)
GLUCOSE, WHOLE BLOOD: 102 MG/DL (ref 70–108)
GLUCOSE, WHOLE BLOOD: 116 MG/DL (ref 70–108)
GLUCOSE, WHOLE BLOOD: 119 MG/DL (ref 70–108)
GLUCOSE, WHOLE BLOOD: 130 MG/DL (ref 70–108)
GLUCOSE, WHOLE BLOOD: 130 MG/DL (ref 70–108)
GLUCOSE, WHOLE BLOOD: 136 MG/DL (ref 70–108)
GLUCOSE, WHOLE BLOOD: 145 MG/DL (ref 70–108)
GLUCOSE, WHOLE BLOOD: 81 MG/DL (ref 70–108)
GLUCOSE, WHOLE BLOOD: 88 MG/DL (ref 70–108)
GLUCOSE, WHOLE BLOOD: 91 MG/DL (ref 70–108)
GLUCOSE, WHOLE BLOOD: 91 MG/DL (ref 70–108)
GLUCOSE, WHOLE BLOOD: 98 MG/DL (ref 70–108)
GLUCOSE, WHOLE BLOOD: 98 MG/DL (ref 70–108)
HCT VFR BLD CALC: 20.4 % (ref 42–52)
HCT VFR BLD CALC: 20.9 % (ref 42–52)
HCT VFR BLD CALC: 21.3 % (ref 42–52)
HCT VFR BLD CALC: 21.5 % (ref 42–52)
HCT VFR BLD CALC: 21.6 % (ref 42–52)
HCT VFR BLD CALC: 21.6 % (ref 42–52)
HCT VFR BLD CALC: 21.7 % (ref 42–52)
HCT VFR BLD CALC: 21.7 % (ref 42–52)
HCT VFR BLD CALC: 21.8 % (ref 42–52)
HCT VFR BLD CALC: 21.8 % (ref 42–52)
HCT VFR BLD CALC: 21.9 % (ref 42–52)
HCT VFR BLD CALC: 22.1 % (ref 42–52)
HCT VFR BLD CALC: 22.2 % (ref 42–52)
HCT VFR BLD CALC: 22.8 % (ref 42–52)
HCT VFR BLD CALC: 23.2 % (ref 42–52)
HCT VFR BLD CALC: 23.6 % (ref 42–52)
HCT VFR BLD CALC: 23.6 % (ref 42–52)
HCT VFR BLD CALC: 23.7 % (ref 42–52)
HCT VFR BLD CALC: 23.8 % (ref 42–52)
HCT VFR BLD CALC: 24.4 % (ref 42–52)
HCT VFR BLD CALC: 24.5 % (ref 42–52)
HCT VFR BLD CALC: 24.7 % (ref 42–52)
HCT VFR BLD CALC: 24.8 % (ref 42–52)
HCT VFR BLD CALC: 25.3 % (ref 42–52)
HCT VFR BLD CALC: 25.3 % (ref 42–52)
HCT VFR BLD CALC: 25.7 % (ref 42–52)
HCT VFR BLD CALC: 25.8 % (ref 42–52)
HCT VFR BLD CALC: 25.9 % (ref 42–52)
HCT VFR BLD CALC: 26.1 % (ref 42–52)
HCT VFR BLD CALC: 26.5 % (ref 42–52)
HCT VFR BLD CALC: 26.8 % (ref 42–52)
HCT VFR BLD CALC: 27 % (ref 42–52)
HCT VFR BLD CALC: 27.2 % (ref 42–52)
HCT VFR BLD CALC: 27.5 % (ref 42–52)
HCT VFR BLD CALC: 27.9 % (ref 41–53)
HCT VFR BLD CALC: 28.3 % (ref 42–52)
HCT VFR BLD CALC: 28.4 % (ref 42–52)
HCT VFR BLD CALC: 28.7 % (ref 42–52)
HCT VFR BLD CALC: 30.1 % (ref 42–52)
HCT VFR BLD CALC: 31.9 % (ref 42–52)
HDLC SERPL-MCNC: 61 MG/DL
HEMOGLOBIN: 10.2 GM/DL (ref 14–18)
HEMOGLOBIN: 10.5 GM/DL (ref 14–18)
HEMOGLOBIN: 6.9 GM/DL (ref 14–18)
HEMOGLOBIN: 7.1 GM/DL (ref 14–18)
HEMOGLOBIN: 7.2 GM/DL (ref 14–18)
HEMOGLOBIN: 7.3 GM/DL (ref 14–18)
HEMOGLOBIN: 7.3 GM/DL (ref 14–18)
HEMOGLOBIN: 7.4 GM/DL (ref 14–18)
HEMOGLOBIN: 7.4 GM/DL (ref 14–18)
HEMOGLOBIN: 7.5 GM/DL (ref 14–18)
HEMOGLOBIN: 7.6 GM/DL (ref 14–18)
HEMOGLOBIN: 7.8 GM/DL (ref 14–18)
HEMOGLOBIN: 8.1 GM/DL (ref 14–18)
HEMOGLOBIN: 8.1 GM/DL (ref 14–18)
HEMOGLOBIN: 8.2 GM/DL (ref 14–18)
HEMOGLOBIN: 8.3 GM/DL (ref 14–18)
HEMOGLOBIN: 8.4 GM/DL (ref 14–18)
HEMOGLOBIN: 8.5 GM/DL (ref 14–18)
HEMOGLOBIN: 8.6 GM/DL (ref 14–18)
HEMOGLOBIN: 8.7 GM/DL (ref 14–18)
HEMOGLOBIN: 8.7 GM/DL (ref 14–18)
HEMOGLOBIN: 8.9 GM/DL (ref 14–18)
HEMOGLOBIN: 8.9 GM/DL (ref 14–18)
HEMOGLOBIN: 9 GM/DL (ref 14–18)
HEMOGLOBIN: 9 GM/DL (ref 14–18)
HEMOGLOBIN: 9.1 G/DL (ref 13.5–17.5)
HEMOGLOBIN: 9.2 GM/DL (ref 14–18)
HEMOGLOBIN: 9.2 GM/DL (ref 14–18)
HEMOGLOBIN: 9.3 GM/DL (ref 14–18)
HEMOGLOBIN: 9.4 GM/DL (ref 14–18)
HEMOGLOBIN: 9.8 GM/DL (ref 14–18)
ICTOTEST: POSITIVE
IMMATURE GRANS (ABS): 0 THOU/MM3 (ref 0–0.07)
IMMATURE GRANS (ABS): 0.01 THOU/MM3 (ref 0–0.07)
IMMATURE GRANS (ABS): 0.01 THOU/MM3 (ref 0–0.07)
IMMATURE GRANS (ABS): 0.02 THOU/MM3 (ref 0–0.07)
IMMATURE GRANS (ABS): 0.03 THOU/MM3 (ref 0–0.07)
IMMATURE GRANS (ABS): 0.03 THOU/MM3 (ref 0–0.07)
IMMATURE GRANS (ABS): 0.04 THOU/MM3 (ref 0–0.07)
IMMATURE GRANS (ABS): 0.05 THOU/MM3 (ref 0–0.07)
IMMATURE GRANS (ABS): 0.06 THOU/MM3 (ref 0–0.07)
IMMATURE GRANS (ABS): 0.07 THOU/MM3 (ref 0–0.07)
IMMATURE GRANS (ABS): 0.1 THOU/MM3 (ref 0–0.07)
IMMATURE GRANS (ABS): 0.11 THOU/MM3 (ref 0–0.07)
IMMATURE GRANS (ABS): 0.11 THOU/MM3 (ref 0–0.07)
IMMATURE GRANS (ABS): 0.13 THOU/MM3 (ref 0–0.07)
IMMATURE GRANS (ABS): 0.18 THOU/MM3 (ref 0–0.07)
IMMATURE GRANS (ABS): 0.2 THOU/MM3 (ref 0–0.07)
IMMATURE GRANS (ABS): 1.35 THOU/MM3 (ref 0–0.07)
IMMATURE GRANS (ABS): 1.63 THOU/MM3 (ref 0–0.07)
IMMATURE GRANS (ABS): 2.16 THOU/MM3 (ref 0–0.07)
IMMATURE GRANULOCYTES: 0 %
IMMATURE GRANULOCYTES: 0.4 %
IMMATURE GRANULOCYTES: 0.5 %
IMMATURE GRANULOCYTES: 0.5 %
IMMATURE GRANULOCYTES: 0.7 %
IMMATURE GRANULOCYTES: 0.8 %
IMMATURE GRANULOCYTES: 0.8 %
IMMATURE GRANULOCYTES: 0.9 %
IMMATURE GRANULOCYTES: 1 %
IMMATURE GRANULOCYTES: 1 %
IMMATURE GRANULOCYTES: 1.1 %
IMMATURE GRANULOCYTES: 1.3 %
IMMATURE GRANULOCYTES: 10.4 %
IMMATURE GRANULOCYTES: 2 %
IMMATURE GRANULOCYTES: 2.1 %
IMMATURE GRANULOCYTES: 2.1 %
IMMATURE GRANULOCYTES: 2.2 %
IMMATURE GRANULOCYTES: 3.8 %
IMMATURE GRANULOCYTES: 4.9 %
IMMATURE GRANULOCYTES: 7.4 %
IMMATURE GRANULOCYTES: 8.6 %
IMMATURE GRANULOCYTES: 9.2 %
INR BLD: 1.05 (ref 0.85–1.13)
INR BLD: 1.09 (ref 0.85–1.13)
INR BLD: 1.17 (ref 0.85–1.13)
IONIZED CALCIUM, WHOLE BLOOD: 0.91 MMOL/L (ref 1.12–1.32)
IONIZED CALCIUM, WHOLE BLOOD: 0.96 MMOL/L (ref 1.12–1.32)
IONIZED CALCIUM, WHOLE BLOOD: 1 MMOL/L (ref 1.12–1.32)
IONIZED CALCIUM, WHOLE BLOOD: 1 MMOL/L (ref 1.12–1.32)
IONIZED CALCIUM, WHOLE BLOOD: 1.01 MMOL/L (ref 1.12–1.32)
IONIZED CALCIUM, WHOLE BLOOD: 1.02 MMOL/L (ref 1.12–1.32)
IONIZED CALCIUM, WHOLE BLOOD: 1.04 MMOL/L (ref 1.12–1.32)
IONIZED CALCIUM, WHOLE BLOOD: 1.04 MMOL/L (ref 1.12–1.32)
IONIZED CALCIUM, WHOLE BLOOD: 1.05 MMOL/L (ref 1.12–1.32)
IONIZED CALCIUM, WHOLE BLOOD: 1.06 MMOL/L (ref 1.12–1.32)
IONIZED CALCIUM, WHOLE BLOOD: 1.07 MMOL/L (ref 1.12–1.32)
IONIZED CALCIUM, WHOLE BLOOD: 1.1 MMOL/L (ref 1.12–1.32)
IONIZED CALCIUM, WHOLE BLOOD: 1.1 MMOL/L (ref 1.12–1.32)
IONIZED CALCIUM, WHOLE BLOOD: 1.13 MMOL/L (ref 1.12–1.32)
IRON SATURATION: 30 % (ref 20–50)
IRON: 53 UG/DL (ref 65–195)
IRON: 94 UG/DL (ref 65–195)
KETONES, URINE: 15
KETONES, URINE: ABNORMAL
KETONES, URINE: NEGATIVE
LACTIC ACID, SEPSIS: 1 MMOL/L (ref 0.5–1.9)
LACTIC ACID, SEPSIS: 1.2 MMOL/L (ref 0.5–1.9)
LACTIC ACID, SEPSIS: 1.6 MMOL/L (ref 0.5–1.9)
LACTIC ACID: 0.7 MMOL/L (ref 0.5–2.2)
LDL CHOLESTEROL CALCULATED: 55 MG/DL
LEUKOCYTE CLUMPS, URINE: ABNORMAL
LEUKOCYTE CLUMPS, URINE: ABNORMAL
LEUKOCYTE CLUMPS, URINE: NEGATIVE
LEUKOCYTE CLUMPS, URINE: NEGATIVE
LEUKOCYTE ESTERASE, URINE: ABNORMAL
LEUKOCYTE ESTERASE, URINE: ABNORMAL
LEUKOCYTE ESTERASE, URINE: NEGATIVE
LIPASE: 12.9 U/L (ref 5.6–51.3)
LIPASE: 18.5 U/L (ref 5.6–51.3)
LIPASE: 21.5 U/L (ref 5.6–51.3)
LIPASE: 23.2 U/L (ref 5.6–51.3)
LIPASE: 34.8 U/L (ref 5.6–51.3)
LIPASE: 37.8 U/L (ref 5.6–51.3)
LV EF: 60 %
LVEF MODALITY: NORMAL
LYMPHOCYTES # BLD: 10.1 %
LYMPHOCYTES # BLD: 11.7 %
LYMPHOCYTES # BLD: 11.8 %
LYMPHOCYTES # BLD: 12 %
LYMPHOCYTES # BLD: 13.8 %
LYMPHOCYTES # BLD: 15 %
LYMPHOCYTES # BLD: 18.7 %
LYMPHOCYTES # BLD: 20.8 %
LYMPHOCYTES # BLD: 23 %
LYMPHOCYTES # BLD: 24.8 %
LYMPHOCYTES # BLD: 25 %
LYMPHOCYTES # BLD: 25.2 %
LYMPHOCYTES # BLD: 26 %
LYMPHOCYTES # BLD: 27.6 %
LYMPHOCYTES # BLD: 27.7 %
LYMPHOCYTES # BLD: 28.6 %
LYMPHOCYTES # BLD: 28.7 %
LYMPHOCYTES # BLD: 34.2 %
LYMPHOCYTES # BLD: 35 %
LYMPHOCYTES # BLD: 36 %
LYMPHOCYTES # BLD: 37 %
LYMPHOCYTES # BLD: 38.4 %
LYMPHOCYTES # BLD: 39 %
LYMPHOCYTES # BLD: 39.9 %
LYMPHOCYTES # BLD: 41 %
LYMPHOCYTES # BLD: 45.8 %
LYMPHOCYTES # BLD: 46.8 %
LYMPHOCYTES # BLD: 48 %
LYMPHOCYTES # BLD: 53.3 %
LYMPHOCYTES # BLD: 53.5 %
LYMPHOCYTES # BLD: 56.5 %
LYMPHOCYTES # BLD: 62.7 %
LYMPHOCYTES # BLD: 63.6 %
LYMPHOCYTES # BLD: 64.2 %
LYMPHOCYTES # BLD: 64.7 %
LYMPHOCYTES # BLD: 66 %
LYMPHOCYTES # BLD: 8 %
LYMPHOCYTES # BLD: 80 %
LYMPHOCYTES ABSOLUTE: 0.6 THOU/MM3 (ref 1–4.8)
LYMPHOCYTES ABSOLUTE: 0.6 THOU/MM3 (ref 1–4.8)
LYMPHOCYTES ABSOLUTE: 0.7 THOU/MM3 (ref 1–4.8)
LYMPHOCYTES ABSOLUTE: 0.8 THOU/MM3 (ref 1–4.8)
LYMPHOCYTES ABSOLUTE: 0.9 THOU/MM3 (ref 1–4.8)
LYMPHOCYTES ABSOLUTE: 0.9 THOU/MM3 (ref 1–4.8)
LYMPHOCYTES ABSOLUTE: 1 THOU/MM3 (ref 1–4.8)
LYMPHOCYTES ABSOLUTE: 1 THOU/MM3 (ref 1–4.8)
LYMPHOCYTES ABSOLUTE: 1.1 THOU/MM3 (ref 1–4.8)
LYMPHOCYTES ABSOLUTE: 1.3 THOU/MM3 (ref 1–4.8)
LYMPHOCYTES ABSOLUTE: 1.4 THOU/MM3 (ref 1–4.8)
LYMPHOCYTES ABSOLUTE: 1.5 THOU/MM3 (ref 1–4.8)
LYMPHOCYTES ABSOLUTE: 1.6 THOU/MM3 (ref 1–4.8)
LYMPHOCYTES ABSOLUTE: 1.6 THOU/MM3 (ref 1–4.8)
LYMPHOCYTES ABSOLUTE: 1.7 THOU/MM3 (ref 1–4.8)
LYMPHOCYTES ABSOLUTE: 1.8 THOU/MM3 (ref 1–4.8)
LYMPHOCYTES ABSOLUTE: 1.9 THOU/MM3 (ref 1–4.8)
LYMPHOCYTES ABSOLUTE: 1.9 THOU/MM3 (ref 1–4.8)
LYMPHOCYTES ABSOLUTE: 1500 /ΜL
LYMPHOCYTES ABSOLUTE: 2.5 THOU/MM3 (ref 1–4.8)
LYMPHOCYTES ABSOLUTE: 2.9 THOU/MM3 (ref 1–4.8)
LYMPHOCYTES ABSOLUTE: 4.2 THOU/MM3 (ref 1–4.8)
LYMPHOCYTES RELATIVE PERCENT: 33.1 %
MAGNESIUM: 1.7 MG/DL (ref 1.6–2.4)
MAGNESIUM: 1.9 MG/DL (ref 1.6–2.4)
MAGNESIUM: 2 MG/DL (ref 1.6–2.4)
MAGNESIUM: 2.1 MG/DL (ref 1.6–2.4)
MAGNESIUM: 2.2 MG/DL (ref 1.6–2.4)
MAGNESIUM: 2.2 MG/DL (ref 1.6–2.4)
MAGNESIUM: 2.3 MG/DL (ref 1.6–2.4)
MCH RBC QN AUTO: 26.6 PG
MCH RBC QN AUTO: 27.1 PG (ref 27–31)
MCH RBC QN AUTO: 27.4 PG (ref 26–33)
MCH RBC QN AUTO: 27.4 PG (ref 26–33)
MCH RBC QN AUTO: 27.6 PG (ref 27–31)
MCH RBC QN AUTO: 28.3 PG (ref 26–33)
MCH RBC QN AUTO: 28.6 PG (ref 26–33)
MCH RBC QN AUTO: 28.7 PG (ref 26–33)
MCH RBC QN AUTO: 28.7 PG (ref 26–33)
MCH RBC QN AUTO: 28.7 PG (ref 27–31)
MCH RBC QN AUTO: 28.9 PG (ref 27–31)
MCH RBC QN AUTO: 29.2 PG (ref 27–31)
MCH RBC QN AUTO: 29.2 PG (ref 27–31)
MCH RBC QN AUTO: 29.3 PG (ref 27–31)
MCH RBC QN AUTO: 29.4 PG (ref 27–31)
MCH RBC QN AUTO: 29.6 PG (ref 26–33)
MCH RBC QN AUTO: 29.8 PG (ref 27–31)
MCH RBC QN AUTO: 30 PG (ref 26–33)
MCH RBC QN AUTO: 30 PG (ref 26–33)
MCH RBC QN AUTO: 30 PG (ref 27–31)
MCH RBC QN AUTO: 30 PG (ref 27–31)
MCH RBC QN AUTO: 30.1 PG (ref 27–31)
MCH RBC QN AUTO: 30.2 PG (ref 26–33)
MCH RBC QN AUTO: 30.2 PG (ref 26–33)
MCH RBC QN AUTO: 30.2 PG (ref 27–31)
MCH RBC QN AUTO: 30.3 PG (ref 27–31)
MCH RBC QN AUTO: 30.4 PG (ref 26–33)
MCH RBC QN AUTO: 30.4 PG (ref 26–33)
MCH RBC QN AUTO: 30.5 PG (ref 27–31)
MCH RBC QN AUTO: 30.5 PG (ref 27–31)
MCH RBC QN AUTO: 30.6 PG (ref 26–33)
MCH RBC QN AUTO: 30.7 PG (ref 26–33)
MCH RBC QN AUTO: 30.7 PG (ref 27–31)
MCH RBC QN AUTO: 30.9 PG (ref 26–33)
MCH RBC QN AUTO: 30.9 PG (ref 26–33)
MCH RBC QN AUTO: 31 PG (ref 27–31)
MCH RBC QN AUTO: 31.1 PG (ref 27–31)
MCH RBC QN AUTO: 31.2 PG (ref 26–33)
MCH RBC QN AUTO: 31.4 PG (ref 27–31)
MCH RBC QN AUTO: 31.5 PG (ref 27–31)
MCH RBC QN AUTO: 32 PG (ref 26–33)
MCH RBC QN AUTO: 32.3 PG (ref 26–33)
MCHC RBC AUTO-ENTMCNC: 31.4 GM/DL (ref 32.2–35.5)
MCHC RBC AUTO-ENTMCNC: 31.7 GM/DL (ref 32.2–35.5)
MCHC RBC AUTO-ENTMCNC: 31.9 GM/DL (ref 32.2–35.5)
MCHC RBC AUTO-ENTMCNC: 31.9 GM/DL (ref 32.2–35.5)
MCHC RBC AUTO-ENTMCNC: 32 GM/DL (ref 32.2–35.5)
MCHC RBC AUTO-ENTMCNC: 32.2 GM/DL (ref 32.2–35.5)
MCHC RBC AUTO-ENTMCNC: 32.5 G/DL
MCHC RBC AUTO-ENTMCNC: 32.7 GM/DL (ref 32.2–35.5)
MCHC RBC AUTO-ENTMCNC: 32.9 GM/DL (ref 32.2–35.5)
MCHC RBC AUTO-ENTMCNC: 33 GM/DL (ref 32.2–35.5)
MCHC RBC AUTO-ENTMCNC: 33.1 GM/DL (ref 32.2–35.5)
MCHC RBC AUTO-ENTMCNC: 33.2 GM/DL (ref 32.2–35.5)
MCHC RBC AUTO-ENTMCNC: 33.5 GM/DL (ref 32.2–35.5)
MCHC RBC AUTO-ENTMCNC: 33.7 GM/DL (ref 32.2–35.5)
MCHC RBC AUTO-ENTMCNC: 33.8 GM/DL (ref 32.2–35.5)
MCHC RBC AUTO-ENTMCNC: 33.9 GM/DL (ref 32.2–35.5)
MCHC RBC AUTO-ENTMCNC: 33.9 GM/DL (ref 32.2–35.5)
MCHC RBC AUTO-ENTMCNC: 34 GM/DL (ref 33–37)
MCHC RBC AUTO-ENTMCNC: 34 GM/DL (ref 33–37)
MCHC RBC AUTO-ENTMCNC: 34.1 GM/DL (ref 32.2–35.5)
MCHC RBC AUTO-ENTMCNC: 34.3 GM/DL (ref 33–37)
MCHC RBC AUTO-ENTMCNC: 34.3 GM/DL (ref 33–37)
MCHC RBC AUTO-ENTMCNC: 34.4 GM/DL (ref 33–37)
MCHC RBC AUTO-ENTMCNC: 34.5 GM/DL (ref 32.2–35.5)
MCHC RBC AUTO-ENTMCNC: 34.5 GM/DL (ref 33–37)
MCHC RBC AUTO-ENTMCNC: 34.6 GM/DL (ref 33–37)
MCHC RBC AUTO-ENTMCNC: 34.7 GM/DL (ref 33–37)
MCHC RBC AUTO-ENTMCNC: 34.8 GM/DL (ref 33–37)
MCHC RBC AUTO-ENTMCNC: 34.9 GM/DL (ref 33–37)
MCHC RBC AUTO-ENTMCNC: 35.2 GM/DL (ref 33–37)
MCHC RBC AUTO-ENTMCNC: 35.3 GM/DL (ref 33–37)
MCV RBC AUTO: 100.4 FL (ref 80–94)
MCV RBC AUTO: 100.9 FL (ref 80–94)
MCV RBC AUTO: 78 FL (ref 80–94)
MCV RBC AUTO: 79 FL (ref 80–94)
MCV RBC AUTO: 81.8 FL
MCV RBC AUTO: 82.9 FL (ref 80–94)
MCV RBC AUTO: 83 FL (ref 80–94)
MCV RBC AUTO: 83.9 FL (ref 80–94)
MCV RBC AUTO: 84 FL (ref 80–94)
MCV RBC AUTO: 84.3 FL (ref 80–94)
MCV RBC AUTO: 84.8 FL (ref 80–94)
MCV RBC AUTO: 85.8 FL (ref 80–94)
MCV RBC AUTO: 86 FL (ref 80–94)
MCV RBC AUTO: 86 FL (ref 80–94)
MCV RBC AUTO: 86.6 FL (ref 80–94)
MCV RBC AUTO: 87 FL (ref 80–94)
MCV RBC AUTO: 87.4 FL (ref 80–94)
MCV RBC AUTO: 87.9 FL (ref 80–94)
MCV RBC AUTO: 88 FL (ref 80–94)
MCV RBC AUTO: 89 FL (ref 80–94)
MCV RBC AUTO: 90 FL (ref 80–94)
MCV RBC AUTO: 90 FL (ref 80–94)
MCV RBC AUTO: 91 FL (ref 80–94)
MCV RBC AUTO: 91.5 FL (ref 80–94)
MCV RBC AUTO: 91.7 FL (ref 80–94)
MCV RBC AUTO: 91.7 FL (ref 80–94)
MCV RBC AUTO: 92 FL (ref 80–94)
MCV RBC AUTO: 92.5 FL (ref 80–94)
MCV RBC AUTO: 93.1 FL (ref 80–94)
MCV RBC AUTO: 93.9 FL (ref 80–94)
MCV RBC AUTO: 93.9 FL (ref 80–94)
MCV RBC AUTO: 97 FL (ref 80–94)
MCV RBC AUTO: 97 FL (ref 80–94)
METAMYELOCYTES: 1 %
METAMYELOCYTES: 1 %
METAMYELOCYTES: 2 %
METAMYELOCYTES: 2 %
METAMYELOCYTES: 3 %
METAMYELOCYTES: 4 %
MISC. #1 REFERENCE GROUP TEST: NORMAL
MISCELLANEOUS 2: ABNORMAL
MISCELLANEOUS 2: ABNORMAL
MISCELLANEOUS 2: NORMAL
MISCELLANEOUS 2: NORMAL
MISCELLANEOUS LAB TEST RESULT: ABNORMAL
MONOCYTES # BLD: 0 %
MONOCYTES # BLD: 1 %
MONOCYTES # BLD: 10 %
MONOCYTES # BLD: 10.1 %
MONOCYTES # BLD: 10.3 %
MONOCYTES # BLD: 11.1 %
MONOCYTES # BLD: 11.7 %
MONOCYTES # BLD: 12 %
MONOCYTES # BLD: 12.1 %
MONOCYTES # BLD: 14.3 %
MONOCYTES # BLD: 14.6 %
MONOCYTES # BLD: 15.5 %
MONOCYTES # BLD: 16.9 %
MONOCYTES # BLD: 17.8 %
MONOCYTES # BLD: 18.7 %
MONOCYTES # BLD: 19.2 %
MONOCYTES # BLD: 2 %
MONOCYTES # BLD: 20.7 %
MONOCYTES # BLD: 21.1 %
MONOCYTES # BLD: 3 %
MONOCYTES # BLD: 4 %
MONOCYTES # BLD: 4 %
MONOCYTES # BLD: 5 %
MONOCYTES # BLD: 5 %
MONOCYTES # BLD: 6.4 %
MONOCYTES # BLD: 6.6 %
MONOCYTES # BLD: 6.9 %
MONOCYTES # BLD: 7 %
MONOCYTES # BLD: 7.1 %
MONOCYTES # BLD: 8 %
MONOCYTES # BLD: 8.5 %
MONOCYTES # BLD: 8.6 %
MONOCYTES # BLD: 8.8 %
MONOCYTES # BLD: 9.7 %
MONOCYTES ABSOLUTE: 0 THOU/MM3 (ref 0.4–1.3)
MONOCYTES ABSOLUTE: 0 THOU/MM3 (ref 0.4–1.3)
MONOCYTES ABSOLUTE: 0.1 THOU/MM3 (ref 0.4–1.3)
MONOCYTES ABSOLUTE: 0.1 THOU/MM3 (ref 0.4–1.3)
MONOCYTES ABSOLUTE: 0.2 THOU/MM3 (ref 0.4–1.3)
MONOCYTES ABSOLUTE: 0.3 THOU/MM3 (ref 0.4–1.3)
MONOCYTES ABSOLUTE: 0.4 THOU/MM3 (ref 0.4–1.3)
MONOCYTES ABSOLUTE: 0.5 THOU/MM3 (ref 0.4–1.3)
MONOCYTES ABSOLUTE: 0.6 THOU/MM3 (ref 0.4–1.3)
MONOCYTES ABSOLUTE: 0.6 THOU/MM3 (ref 0.4–1.3)
MONOCYTES ABSOLUTE: 0.7 THOU/MM3 (ref 0.4–1.3)
MONOCYTES ABSOLUTE: 0.8 THOU/MM3 (ref 0.4–1.3)
MONOCYTES ABSOLUTE: 1 THOU/MM3 (ref 0.4–1.3)
MONOCYTES ABSOLUTE: 1.6 THOU/MM3 (ref 0.4–1.3)
MONOCYTES ABSOLUTE: 2.1 THOU/MM3 (ref 0.4–1.3)
MONOCYTES ABSOLUTE: 500 /ΜL
MONOCYTES RELATIVE PERCENT: 10.6 %
MYELOCYTES: 1 %
MYELOCYTES: 2 %
MYELOCYTES: 2 %
MYELOCYTES: 3 %
NEUTROPHILS ABSOLUTE: 2600 /ΜL
NEUTROPHILS RELATIVE PERCENT: 56 %
NITRITE, URINE: NEGATIVE
NITRITE, URINE: POSITIVE
NUCLEATED RED BLOOD CELLS: 0 /100 WBC
NUCLEATED RED BLOOD CELLS: 10 /100 WBC
NUCLEATED RED BLOOD CELLS: 12 /100 WBC
NUCLEATED RED BLOOD CELLS: 16 /100 WBC
NUCLEATED RED BLOOD CELLS: 17 /100 WBC
NUCLEATED RED BLOOD CELLS: 17 /100 WBC
NUCLEATED RED BLOOD CELLS: 18 /100 WBC
NUCLEATED RED BLOOD CELLS: 2 /100 WBC
NUCLEATED RED BLOOD CELLS: 2 /100 WBC
NUCLEATED RED BLOOD CELLS: 20 /100 WBC
NUCLEATED RED BLOOD CELLS: 23 /100 WBC
NUCLEATED RED BLOOD CELLS: 23 /100 WBC
NUCLEATED RED BLOOD CELLS: 24 /100 WBC
NUCLEATED RED BLOOD CELLS: 24 /100 WBC
NUCLEATED RED BLOOD CELLS: 28 /100 WBC
NUCLEATED RED BLOOD CELLS: 3 /100 WBC
NUCLEATED RED BLOOD CELLS: 30 /100 WBC
NUCLEATED RED BLOOD CELLS: 34 /100 WBC
NUCLEATED RED BLOOD CELLS: 35 /100 WBC
NUCLEATED RED BLOOD CELLS: 36 /100 WBC
NUCLEATED RED BLOOD CELLS: 4 /100 WBC
NUCLEATED RED BLOOD CELLS: 47 /100 WBC
NUCLEATED RED BLOOD CELLS: 5 /100 WBC
NUCLEATED RED BLOOD CELLS: 6 /100 WBC
NUCLEATED RED BLOOD CELLS: 7 /100 WBC
NUCLEATED RED BLOOD CELLS: 8 /100 WBC
NUCLEATED RED BLOOD CELLS: 9 /100 WBC
NUCLEATED RED BLOOD CELLS: 9 /100 WBC
OPIATES, URINE: NEGATIVE
OPIATES, URINE: NEGATIVE
ORGANISM: ABNORMAL
OSMOLALITY CALCULATION: 269 MOSMOL/KG (ref 275–300)
OSMOLALITY CALCULATION: 269.6 MOSMOL/KG (ref 275–300)
OSMOLALITY CALCULATION: 272.5 MOSMOL/KG (ref 275–300)
OSMOLALITY CALCULATION: 272.6 MOSMOL/KG (ref 275–300)
OSMOLALITY CALCULATION: 272.7 MOSMOL/KG (ref 275–300)
OSMOLALITY CALCULATION: 274.6 MOSMOL/KG (ref 275–300)
OSMOLALITY CALCULATION: 274.7 MOSMOL/KG (ref 275–300)
OSMOLALITY CALCULATION: 275.4 MOSMOL/KG (ref 275–300)
OSMOLALITY CALCULATION: 277.5 MOSMOL/KG (ref 275–300)
OSMOLALITY CALCULATION: 279.9 MOSMOL/KG (ref 275–300)
OSMOLALITY CALCULATION: 287.2 MOSMOL/KG (ref 275–300)
OXYCODONE: NEGATIVE
OXYCODONE: NEGATIVE
PATHOLOGIST REVIEW: ABNORMAL
PDW BLD-RTO: 15.3 % (ref 11.5–14.5)
PDW BLD-RTO: 15.6 % (ref 11.5–14.5)
PDW BLD-RTO: 15.7 % (ref 11.5–14.5)
PDW BLD-RTO: 15.7 % (ref 11.5–14.5)
PDW BLD-RTO: 15.9 % (ref 11.5–14.5)
PDW BLD-RTO: 15.9 % (ref 11.5–14.5)
PDW BLD-RTO: 16 % (ref 11.5–14.5)
PDW BLD-RTO: 16.1 % (ref 11.5–14.5)
PDW BLD-RTO: 16.2 % (ref 11.5–14.5)
PDW BLD-RTO: 16.4 % (ref 11.5–14.5)
PDW BLD-RTO: 16.7 % (ref 11.5–14.5)
PDW BLD-RTO: 17 % (ref 11.5–14.5)
PDW BLD-RTO: 17.2 % (ref 11.5–14.5)
PDW BLD-RTO: 17.4 % (ref 11.5–14.5)
PDW BLD-RTO: 17.4 % (ref 11.5–14.5)
PDW BLD-RTO: 17.5 % (ref 11.5–14.5)
PDW BLD-RTO: 17.5 % (ref 11.5–14.5)
PDW BLD-RTO: 17.6 % (ref 11.5–14.5)
PDW BLD-RTO: 17.6 % (ref 11.5–14.5)
PDW BLD-RTO: 17.8 % (ref 11.5–14.5)
PDW BLD-RTO: 17.9 %
PDW BLD-RTO: 18 % (ref 11.5–14.5)
PH UA: 5 (ref 4.5–8)
PH UA: 6
PH UA: 6.5
PH UA: 7
PH UA: 7
PH UA: 7.5
PH UA: 8.5
PH, URINE: 5
PH, URINE: 5.5
PH, URINE: 6
PH, URINE: 6
PHENCYCLIDINE QUANTITATIVE URINE: NEGATIVE
PHENCYCLIDINE QUANTITATIVE URINE: NEGATIVE
PHOSPHORUS: 1 MG/DL (ref 2.4–4.7)
PLATELET # BLD: 126 THOU/MM3 (ref 130–400)
PLATELET # BLD: 143 THOU/MM3 (ref 130–400)
PLATELET # BLD: 147 THOU/MM3 (ref 130–400)
PLATELET # BLD: 150 THOU/MM3 (ref 130–400)
PLATELET # BLD: 152 THOU/MM3 (ref 130–400)
PLATELET # BLD: 154 THOU/MM3 (ref 130–400)
PLATELET # BLD: 157 THOU/MM3 (ref 130–400)
PLATELET # BLD: 159 THOU/MM3 (ref 130–400)
PLATELET # BLD: 159 THOU/MM3 (ref 130–400)
PLATELET # BLD: 162 THOU/MM3 (ref 130–400)
PLATELET # BLD: 164 THOU/MM3 (ref 130–400)
PLATELET # BLD: 164 THOU/MM3 (ref 130–400)
PLATELET # BLD: 165 THOU/MM3 (ref 130–400)
PLATELET # BLD: 165 THOU/MM3 (ref 130–400)
PLATELET # BLD: 166 THOU/MM3 (ref 130–400)
PLATELET # BLD: 171 THOU/MM3 (ref 130–400)
PLATELET # BLD: 172 THOU/MM3 (ref 130–400)
PLATELET # BLD: 175 THOU/MM3 (ref 130–400)
PLATELET # BLD: 177 THOU/MM3 (ref 130–400)
PLATELET # BLD: 178 THOU/MM3 (ref 130–400)
PLATELET # BLD: 179 THOU/MM3 (ref 130–400)
PLATELET # BLD: 180 THOU/MM3 (ref 130–400)
PLATELET # BLD: 183 THOU/MM3 (ref 130–400)
PLATELET # BLD: 183 THOU/MM3 (ref 130–400)
PLATELET # BLD: 186 THOU/MM3 (ref 130–400)
PLATELET # BLD: 196 THOU/MM3 (ref 130–400)
PLATELET # BLD: 205 THOU/MM3 (ref 130–400)
PLATELET # BLD: 212 THOU/MM3 (ref 130–400)
PLATELET # BLD: 213 THOU/MM3 (ref 130–400)
PLATELET # BLD: 216 THOU/MM3 (ref 130–400)
PLATELET # BLD: 221 THOU/MM3 (ref 130–400)
PLATELET # BLD: 231 THOU/MM3 (ref 130–400)
PLATELET # BLD: 236 THOU/MM3 (ref 130–400)
PLATELET # BLD: 237 THOU/MM3 (ref 130–400)
PLATELET # BLD: 241 THOU/MM3 (ref 130–400)
PLATELET # BLD: 245 THOU/MM3 (ref 130–400)
PLATELET # BLD: 246 THOU/MM3 (ref 130–400)
PLATELET # BLD: 261 THOU/MM3 (ref 130–400)
PLATELET # BLD: 296 THOU/MM3 (ref 130–400)
PLATELET # BLD: 343 THOU/MM3 (ref 130–400)
PLATELET # BLD: 358 K/ΜL
PLATELET # BLD: 397 THOU/MM3 (ref 130–400)
PLATELET ESTIMATE: ADEQUATE
PMV BLD AUTO: 10.3 FL (ref 9.4–12.4)
PMV BLD AUTO: 6.4 FL (ref 7.4–10.4)
PMV BLD AUTO: 6.5 FL (ref 7.4–10.4)
PMV BLD AUTO: 6.7 FL (ref 7.4–10.4)
PMV BLD AUTO: 6.8 FL (ref 7.4–10.4)
PMV BLD AUTO: 6.9 FL (ref 7.4–10.4)
PMV BLD AUTO: 7 FL (ref 7.4–10.4)
PMV BLD AUTO: 7.2 FL (ref 7.4–10.4)
PMV BLD AUTO: 7.2 FL (ref 7.4–10.4)
PMV BLD AUTO: 7.3 FL (ref 7.4–10.4)
PMV BLD AUTO: 7.3 FL (ref 7.4–10.4)
PMV BLD AUTO: 7.4 FL (ref 7.4–10.4)
PMV BLD AUTO: 7.6 FL (ref 7.4–10.4)
PMV BLD AUTO: 8.6 FL (ref 9.4–12.4)
PMV BLD AUTO: 8.7 FL (ref 9.4–12.4)
PMV BLD AUTO: 8.8 FL (ref 9.4–12.4)
PMV BLD AUTO: 8.9 FL (ref 9.4–12.4)
PMV BLD AUTO: 8.9 FL (ref 9.4–12.4)
PMV BLD AUTO: 9 FL (ref 9.4–12.4)
PMV BLD AUTO: 9.1 FL (ref 9.4–12.4)
PMV BLD AUTO: 9.1 FL (ref 9.4–12.4)
PMV BLD AUTO: 9.2 FL (ref 9.4–12.4)
PMV BLD AUTO: 9.2 FL (ref 9.4–12.4)
PMV BLD AUTO: 9.4 FL (ref 9.4–12.4)
PMV BLD AUTO: 9.6 FL (ref 9.4–12.4)
PMV BLD AUTO: 9.6 FL (ref 9.4–12.4)
PMV BLD AUTO: 9.7 FL (ref 9.4–12.4)
PMV BLD AUTO: 9.8 FL (ref 9.4–12.4)
PMV BLD AUTO: ABNORMAL FL
POIKILOCYTES: ABNORMAL
POIKILOCYTES: NORMAL
POST VOID RESIDUAL (PVR): 0 ML
POTASSIUM REFLEX MAGNESIUM: 3.3 MEQ/L (ref 3.5–5.2)
POTASSIUM REFLEX MAGNESIUM: 3.4 MEQ/L (ref 3.5–5.2)
POTASSIUM REFLEX MAGNESIUM: 3.4 MEQ/L (ref 3.5–5.2)
POTASSIUM REFLEX MAGNESIUM: 3.8 MEQ/L (ref 3.5–5.2)
POTASSIUM SERPL-SCNC: 3.1 MEQ/L (ref 3.5–5.2)
POTASSIUM SERPL-SCNC: 3.1 MEQ/L (ref 3.5–5.2)
POTASSIUM SERPL-SCNC: 3.2 MEQ/L (ref 3.5–5.2)
POTASSIUM SERPL-SCNC: 3.4 MEQ/L (ref 3.5–5.2)
POTASSIUM SERPL-SCNC: 3.4 MEQ/L (ref 3.5–5.2)
POTASSIUM SERPL-SCNC: 3.5 MEQ/L (ref 3.5–5.2)
POTASSIUM SERPL-SCNC: 3.6 MEQ/L (ref 3.5–5.2)
POTASSIUM SERPL-SCNC: 3.6 MEQ/L (ref 3.5–5.2)
POTASSIUM SERPL-SCNC: 3.7 MEQ/L (ref 3.5–5.2)
POTASSIUM SERPL-SCNC: 3.7 MEQ/L (ref 3.5–5.2)
POTASSIUM SERPL-SCNC: 3.7 MMOL/L
POTASSIUM SERPL-SCNC: 3.8 MEQ/L (ref 3.5–5.2)
POTASSIUM SERPL-SCNC: 3.8 MEQ/L (ref 3.5–5.2)
POTASSIUM SERPL-SCNC: 4 MEQ/L (ref 3.5–5.2)
POTASSIUM SERPL-SCNC: 4.1 MEQ/L (ref 3.5–5.2)
POTASSIUM, WHOLE BLOOD: 2.9 MEQ/L (ref 3.5–4.9)
POTASSIUM, WHOLE BLOOD: 3 MEQ/L (ref 3.5–4.9)
POTASSIUM, WHOLE BLOOD: 3.2 MEQ/L (ref 3.5–4.9)
POTASSIUM, WHOLE BLOOD: 3.3 MEQ/L (ref 3.5–4.9)
POTASSIUM, WHOLE BLOOD: 3.4 MEQ/L (ref 3.5–4.9)
POTASSIUM, WHOLE BLOOD: 3.4 MEQ/L (ref 3.5–4.9)
POTASSIUM, WHOLE BLOOD: 3.5 MEQ/L (ref 3.5–4.9)
POTASSIUM, WHOLE BLOOD: 3.7 MEQ/L (ref 3.5–4.9)
POTASSIUM, WHOLE BLOOD: 3.8 MEQ/L (ref 3.5–4.9)
POTASSIUM, WHOLE BLOOD: 4 MEQ/L (ref 3.5–4.9)
POTASSIUM, WHOLE BLOOD: 4 MEQ/L (ref 3.5–4.9)
PRO-BNP: 274.5 PG/ML (ref 0–900)
PRO-BNP: 324.6 PG/ML (ref 0–900)
PRO-BNP: 428.8 PG/ML (ref 0–900)
PROCALCITONIN: 0.47 NG/ML (ref 0.01–0.09)
PROSTATE SPECIFIC ANTIGEN: 128.2 NG/ML (ref 0–1)
PROSTATE SPECIFIC ANTIGEN: 132 NG/ML (ref 0–1)
PROSTATE SPECIFIC ANTIGEN: 1363 NG/ML (ref 0–1)
PROSTATE SPECIFIC ANTIGEN: 361.3 NG/ML (ref 0–1)
PROSTATE SPECIFIC ANTIGEN: 574.4 NG/ML (ref 0–1)
PROSTATE SPECIFIC ANTIGEN: 71.18 NG/ML (ref 0–1)
PROSTATE SPECIFIC ANTIGEN: 718.2 NG/ML (ref 0–1)
PROSTATE SPECIFIC ANTIGEN: 930.3 NG/ML (ref 0–1)
PROSTATE SPECIFIC ANTIGEN: 949.6 NG/ML (ref 0–1)
PROSTATE SPECIFIC ANTIGEN: 987.5 NG/ML (ref 0–1)
PROTEIN UA: ABNORMAL
PROTEIN UA: ABNORMAL MG/DL
PROTEIN UA: NEGATIVE
PROTEIN UA: NORMAL
PROTEIN, URINE: 100 MG/DL
PROTEIN, URINE: 30 MG/DL
PROTEIN, URINE: 30 MG/DL
PROTEIN, URINE: ABNORMAL MG/DL
RBC # BLD: 2.26 MILL/MM3 (ref 4.7–6.1)
RBC # BLD: 2.31 MILL/MM3 (ref 4.7–6.1)
RBC # BLD: 2.4 MILL/MM3 (ref 4.7–6.1)
RBC # BLD: 2.41 MILL/MM3 (ref 4.7–6.1)
RBC # BLD: 2.42 MILL/MM3 (ref 4.7–6.1)
RBC # BLD: 2.46 MILL/MM3 (ref 4.7–6.1)
RBC # BLD: 2.51 MILL/MM3 (ref 4.7–6.1)
RBC # BLD: 2.52 MILL/MM3 (ref 4.7–6.1)
RBC # BLD: 2.52 MILL/MM3 (ref 4.7–6.1)
RBC # BLD: 2.54 MILL/MM3 (ref 4.7–6.1)
RBC # BLD: 2.54 MILL/MM3 (ref 4.7–6.1)
RBC # BLD: 2.55 MILL/MM3 (ref 4.7–6.1)
RBC # BLD: 2.57 MILL/MM3 (ref 4.7–6.1)
RBC # BLD: 2.58 MILL/MM3 (ref 4.7–6.1)
RBC # BLD: 2.63 MILL/MM3 (ref 4.7–6.1)
RBC # BLD: 2.65 MILL/MM3 (ref 4.7–6.1)
RBC # BLD: 2.68 MILL/MM3 (ref 4.7–6.1)
RBC # BLD: 2.69 MILL/MM3 (ref 4.7–6.1)
RBC # BLD: 2.76 MILL/MM3 (ref 4.7–6.1)
RBC # BLD: 2.77 MILL/MM3 (ref 4.7–6.1)
RBC # BLD: 2.79 MILL/MM3 (ref 4.7–6.1)
RBC # BLD: 2.81 MILL/MM3 (ref 4.7–6.1)
RBC # BLD: 2.82 MILL/MM3 (ref 4.7–6.1)
RBC # BLD: 2.84 MILL/MM3 (ref 4.7–6.1)
RBC # BLD: 2.85 MILL/MM3 (ref 4.7–6.1)
RBC # BLD: 2.89 MILL/MM3 (ref 4.7–6.1)
RBC # BLD: 2.9 MILL/MM3 (ref 4.7–6.1)
RBC # BLD: 2.93 MILL/MM3 (ref 4.7–6.1)
RBC # BLD: 2.96 MILL/MM3 (ref 4.7–6.1)
RBC # BLD: 2.96 MILL/MM3 (ref 4.7–6.1)
RBC # BLD: 2.97 MILL/MM3 (ref 4.7–6.1)
RBC # BLD: 2.99 MILL/MM3 (ref 4.7–6.1)
RBC # BLD: 3.05 MILL/MM3 (ref 4.7–6.1)
RBC # BLD: 3.07 MILL/MM3 (ref 4.7–6.1)
RBC # BLD: 3.18 MILL/MM3 (ref 4.7–6.1)
RBC # BLD: 3.23 MILL/MM3 (ref 4.7–6.1)
RBC # BLD: 3.25 MILL/MM3 (ref 4.7–6.1)
RBC # BLD: 3.42 10^6/ΜL
RBC # BLD: 3.48 MILL/MM3 (ref 4.7–6.1)
RBC # BLD: 3.57 MILL/MM3 (ref 4.7–6.1)
RBC URINE: ABNORMAL /HPF
RBC URINE: NORMAL /HPF
RBC URINE: NORMAL /HPF
RENAL EPITHELIAL, UA: ABNORMAL
RENAL EPITHELIAL, UA: NORMAL
RENAL EPITHELIAL, UA: NORMAL
RETICULOCYTE ABSOLUTE COUNT: 0.9 % (ref 0.5–2)
RH FACTOR: NORMAL
ROULEAUX: SLIGHT
SCAN OF BLOOD SMEAR: NORMAL
SCHISTOCYTES: ABNORMAL
SCHISTOCYTES: NORMAL
SEG NEUTROPHILS: 16 %
SEG NEUTROPHILS: 16.4 %
SEG NEUTROPHILS: 22 %
SEG NEUTROPHILS: 25.9 %
SEG NEUTROPHILS: 26.5 %
SEG NEUTROPHILS: 26.8 %
SEG NEUTROPHILS: 27.7 %
SEG NEUTROPHILS: 35.3 %
SEG NEUTROPHILS: 36.7 %
SEG NEUTROPHILS: 38.1 %
SEG NEUTROPHILS: 40 %
SEG NEUTROPHILS: 40.4 %
SEG NEUTROPHILS: 42.3 %
SEG NEUTROPHILS: 43.7 %
SEG NEUTROPHILS: 44.7 %
SEG NEUTROPHILS: 50 %
SEG NEUTROPHILS: 51.3 %
SEG NEUTROPHILS: 52.6 %
SEG NEUTROPHILS: 53.2 %
SEG NEUTROPHILS: 54.9 %
SEG NEUTROPHILS: 56 %
SEG NEUTROPHILS: 58.6 %
SEG NEUTROPHILS: 60 %
SEG NEUTROPHILS: 61 %
SEG NEUTROPHILS: 62.3 %
SEG NEUTROPHILS: 62.4 %
SEG NEUTROPHILS: 62.9 %
SEG NEUTROPHILS: 63.7 %
SEG NEUTROPHILS: 64 %
SEG NEUTROPHILS: 66 %
SEG NEUTROPHILS: 66 %
SEG NEUTROPHILS: 73.1 %
SEG NEUTROPHILS: 73.1 %
SEG NEUTROPHILS: 73.4 %
SEG NEUTROPHILS: 74 %
SEG NEUTROPHILS: 77 %
SEG NEUTROPHILS: 77.3 %
SEG NEUTROPHILS: 78 %
SEGMENTED NEUTROPHILS ABSOLUTE COUNT: 0.3 THOU/MM3 (ref 1.8–7.7)
SEGMENTED NEUTROPHILS ABSOLUTE COUNT: 0.4 THOU/MM3 (ref 1.8–7.7)
SEGMENTED NEUTROPHILS ABSOLUTE COUNT: 0.4 THOU/MM3 (ref 1.8–7.7)
SEGMENTED NEUTROPHILS ABSOLUTE COUNT: 0.5 THOU/MM3 (ref 1.8–7.7)
SEGMENTED NEUTROPHILS ABSOLUTE COUNT: 0.6 THOU/MM3 (ref 1.8–7.7)
SEGMENTED NEUTROPHILS ABSOLUTE COUNT: 0.6 THOU/MM3 (ref 1.8–7.7)
SEGMENTED NEUTROPHILS ABSOLUTE COUNT: 0.8 THOU/MM3 (ref 1.8–7.7)
SEGMENTED NEUTROPHILS ABSOLUTE COUNT: 0.9 THOU/MM3 (ref 1.8–7.7)
SEGMENTED NEUTROPHILS ABSOLUTE COUNT: 1 THOU/MM3 (ref 1.8–7.7)
SEGMENTED NEUTROPHILS ABSOLUTE COUNT: 1.4 THOU/MM3 (ref 1.8–7.7)
SEGMENTED NEUTROPHILS ABSOLUTE COUNT: 1.6 THOU/MM3 (ref 1.8–7.7)
SEGMENTED NEUTROPHILS ABSOLUTE COUNT: 13.3 THOU/MM3 (ref 1.8–7.7)
SEGMENTED NEUTROPHILS ABSOLUTE COUNT: 15.1 THOU/MM3 (ref 1.8–7.7)
SEGMENTED NEUTROPHILS ABSOLUTE COUNT: 17.8 THOU/MM3 (ref 1.8–7.7)
SEGMENTED NEUTROPHILS ABSOLUTE COUNT: 2.1 THOU/MM3 (ref 1.8–7.7)
SEGMENTED NEUTROPHILS ABSOLUTE COUNT: 2.2 THOU/MM3 (ref 1.8–7.7)
SEGMENTED NEUTROPHILS ABSOLUTE COUNT: 2.4 THOU/MM3 (ref 1.8–7.7)
SEGMENTED NEUTROPHILS ABSOLUTE COUNT: 2.5 THOU/MM3 (ref 1.8–7.7)
SEGMENTED NEUTROPHILS ABSOLUTE COUNT: 2.6 THOU/MM3 (ref 1.8–7.7)
SEGMENTED NEUTROPHILS ABSOLUTE COUNT: 2.9 THOU/MM3 (ref 1.8–7.7)
SEGMENTED NEUTROPHILS ABSOLUTE COUNT: 2.9 THOU/MM3 (ref 1.8–7.7)
SEGMENTED NEUTROPHILS ABSOLUTE COUNT: 3.1 THOU/MM3 (ref 1.8–7.7)
SEGMENTED NEUTROPHILS ABSOLUTE COUNT: 3.2 THOU/MM3 (ref 1.8–7.7)
SEGMENTED NEUTROPHILS ABSOLUTE COUNT: 3.5 THOU/MM3 (ref 1.8–7.7)
SEGMENTED NEUTROPHILS ABSOLUTE COUNT: 3.6 THOU/MM3 (ref 1.8–7.7)
SEGMENTED NEUTROPHILS ABSOLUTE COUNT: 4.4 THOU/MM3 (ref 1.8–7.7)
SEGMENTED NEUTROPHILS ABSOLUTE COUNT: 4.5 THOU/MM3 (ref 1.8–7.7)
SEGMENTED NEUTROPHILS ABSOLUTE COUNT: 5.5 THOU/MM3 (ref 1.8–7.7)
SEGMENTED NEUTROPHILS ABSOLUTE COUNT: 8.4 THOU/MM3 (ref 1.8–7.7)
SEGMENTED NEUTROPHILS ABSOLUTE COUNT: 9 THOU/MM3 (ref 1.8–7.7)
SODIUM BLD-SCNC: 133 MEQ/L (ref 135–145)
SODIUM BLD-SCNC: 135 MEQ/L (ref 135–145)
SODIUM BLD-SCNC: 135 MEQ/L (ref 135–145)
SODIUM BLD-SCNC: 136 MEQ/L (ref 135–145)
SODIUM BLD-SCNC: 137 MEQ/L (ref 135–145)
SODIUM BLD-SCNC: 137 MMOL/L
SODIUM BLD-SCNC: 138 MEQ/L (ref 135–145)
SODIUM BLD-SCNC: 139 MEQ/L (ref 135–145)
SODIUM BLD-SCNC: 139 MEQ/L (ref 135–145)
SODIUM BLD-SCNC: 140 MEQ/L (ref 135–145)
SODIUM BLD-SCNC: 141 MEQ/L (ref 135–145)
SODIUM, WHOLE BLOOD: 131 MEQ/L (ref 138–146)
SODIUM, WHOLE BLOOD: 133 MEQ/L (ref 138–146)
SODIUM, WHOLE BLOOD: 134 MEQ/L (ref 138–146)
SODIUM, WHOLE BLOOD: 135 MEQ/L (ref 138–146)
SODIUM, WHOLE BLOOD: 136 MEQ/L (ref 138–146)
SODIUM, WHOLE BLOOD: 137 MEQ/L (ref 138–146)
SODIUM, WHOLE BLOOD: 138 MEQ/L (ref 138–146)
SODIUM, WHOLE BLOOD: 141 MEQ/L (ref 138–146)
SPECIFIC GRAVITY UA: 1.02 (ref 1–1.03)
SPECIFIC GRAVITY UA: 1.02 (ref 1–1.03)
SPECIFIC GRAVITY, URINE: 1 (ref 1–1.03)
SPECIFIC GRAVITY, URINE: 1.01 (ref 1–1.03)
SPECIFIC GRAVITY, URINE: 1.02 (ref 1–1.03)
SPECIFIC GRAVITY, URINE: < 1.005 (ref 1–1.03)
SPECIFIC GRAVITY, URINE: >= 1.03 (ref 1–1.03)
T4 FREE: 1.05 NG/DL (ref 0.93–1.76)
T4 FREE: 1.07 NG/DL (ref 0.93–1.76)
T4 FREE: 2.2 NG/DL (ref 0.93–1.76)
T4 FREE: 2.81 NG/DL (ref 0.93–1.76)
TESTOSTERONE TOTAL: 10 NG/DL (ref 300–720)
TESTOSTERONE TOTAL: < 3 NG/DL (ref 300–720)
TOTAL CK: 1005 U/L (ref 55–170)
TOTAL CK: 1357 U/L (ref 55–170)
TOTAL CK: 1783 U/L (ref 55–170)
TOTAL CO2, WHOLE BLOOD: 22 MEQ/L (ref 23–33)
TOTAL CO2, WHOLE BLOOD: 25 MEQ/L (ref 23–33)
TOTAL CO2, WHOLE BLOOD: 25 MEQ/L (ref 23–33)
TOTAL CO2, WHOLE BLOOD: 26 MEQ/L (ref 23–33)
TOTAL CO2, WHOLE BLOOD: 27 MEQ/L (ref 23–33)
TOTAL CO2, WHOLE BLOOD: 28 MEQ/L (ref 23–33)
TOTAL CO2, WHOLE BLOOD: 28 MEQ/L (ref 23–33)
TOTAL IRON BINDING CAPACITY: 175 UG/DL (ref 171–450)
TOTAL IRON BINDING CAPACITY: 212 UG/DL (ref 171–450)
TOTAL PROTEIN: 4.7 G/DL (ref 6.1–8)
TOTAL PROTEIN: 5.5 G/DL (ref 6.1–8)
TOTAL PROTEIN: 5.6 G/DL (ref 6.1–8)
TOTAL PROTEIN: 5.7 G/DL (ref 6.1–8)
TOTAL PROTEIN: 5.8 G/DL (ref 6.1–8)
TOTAL PROTEIN: 6 G/DL (ref 6.1–8)
TOTAL PROTEIN: 6.2 G/DL (ref 6.1–8)
TOTAL PROTEIN: 6.2 G/DL (ref 6.1–8)
TOTAL PROTEIN: 6.3 G/DL (ref 6.1–8)
TOTAL PROTEIN: 6.4 G/DL (ref 6.1–8)
TOTAL PROTEIN: 6.5 G/DL (ref 6.1–8)
TOTAL PROTEIN: 6.7 G/DL (ref 6.1–8)
TOTAL PROTEIN: 6.7 G/DL (ref 6.1–8)
TOTAL PROTEIN: 6.9 G/DL (ref 6.1–8)
TOTAL PROTEIN: 7 G/DL (ref 6.1–8)
TOTAL PROTEIN: 7.7 G/DL (ref 6.1–8)
TOTAL PROTEIN: 7.8 G/DL (ref 6.1–8)
TOTAL PROTEIN: 8.3 G/DL (ref 6.1–8)
TOTAL PROTEIN: 8.7
TOXIC GRANULATION: PRESENT
TRANSFERRIN: 135 MG/DL (ref 200–400)
TRIGL SERPL-MCNC: 69 MG/DL (ref 0–199)
TROPONIN T: 0.02 NG/ML
TROPONIN T: < 0.01 NG/ML
TSH SERPL DL<=0.05 MIU/L-ACNC: 0.14 UIU/ML (ref 0.4–4.2)
TSH SERPL DL<=0.05 MIU/L-ACNC: 10.54 UIU/ML (ref 0.4–4.2)
TSH SERPL DL<=0.05 MIU/L-ACNC: 2.03 UIU/ML (ref 0.4–4.2)
TSH SERPL DL<=0.05 MIU/L-ACNC: 6.52 UIU/ML (ref 0.4–4.2)
TSH SERPL DL<=0.05 MIU/L-ACNC: 8.96 UIU/ML (ref 0.4–4.2)
URIC ACID: 8.2 MG/DL (ref 3.7–7)
URINE CULTURE REFLEX: ABNORMAL
URINE CULTURE REFLEX: ABNORMAL
URINE CULTURE, ROUTINE: ABNORMAL
URINE CULTURE, ROUTINE: ABNORMAL
UROBILINOGEN, URINE: 0.2 EU/DL
UROBILINOGEN, URINE: 1 EU/DL
UROBILINOGEN, URINE: 2 EU/DL
UROBILINOGEN, URINE: NORMAL
VITAMIN B-12: 418 PG/ML (ref 211–911)
VITAMIN D 25-HYDROXY: 15 NG/ML (ref 30–100)
WBC # BLD: 1 THOU/MM3 (ref 4.8–10.8)
WBC # BLD: 11.7 THOU/MM3 (ref 4.8–10.8)
WBC # BLD: 15.3 THOU/MM3 (ref 4.8–10.8)
WBC # BLD: 18.1 THOU/MM3 (ref 4.8–10.8)
WBC # BLD: 19.3 THOU/MM3 (ref 4.8–10.8)
WBC # BLD: 2 THOU/MM3 (ref 4.8–10.8)
WBC # BLD: 2.1 THOU/MM3 (ref 4.8–10.8)
WBC # BLD: 2.1 THOU/MM3 (ref 4.8–10.8)
WBC # BLD: 2.3 THOU/MM3 (ref 4.8–10.8)
WBC # BLD: 2.5 THOU/MM3 (ref 4.8–10.8)
WBC # BLD: 2.6 THOU/MM3 (ref 4.8–10.8)
WBC # BLD: 2.7 THOU/MM3 (ref 4.8–10.8)
WBC # BLD: 2.8 THOU/MM3 (ref 4.8–10.8)
WBC # BLD: 2.8 THOU/MM3 (ref 4.8–10.8)
WBC # BLD: 2.9 THOU/MM3 (ref 4.8–10.8)
WBC # BLD: 24.3 THOU/MM3 (ref 4.8–10.8)
WBC # BLD: 3.1 THOU/MM3 (ref 4.8–10.8)
WBC # BLD: 3.5 THOU/MM3 (ref 4.8–10.8)
WBC # BLD: 3.6 THOU/MM3 (ref 4.8–10.8)
WBC # BLD: 3.6 THOU/MM3 (ref 4.8–10.8)
WBC # BLD: 3.7 THOU/MM3 (ref 4.8–10.8)
WBC # BLD: 3.7 THOU/MM3 (ref 4.8–10.8)
WBC # BLD: 4.1 THOU/MM3 (ref 4.8–10.8)
WBC # BLD: 4.5 THOU/MM3 (ref 4.8–10.8)
WBC # BLD: 4.6 THOU/MM3 (ref 4.8–10.8)
WBC # BLD: 4.6 THOU/MM3 (ref 4.8–10.8)
WBC # BLD: 4.7 10^3/ML
WBC # BLD: 4.7 THOU/MM3 (ref 4.8–10.8)
WBC # BLD: 4.7 THOU/MM3 (ref 4.8–10.8)
WBC # BLD: 5 THOU/MM3 (ref 4.8–10.8)
WBC # BLD: 5.1 THOU/MM3 (ref 4.8–10.8)
WBC # BLD: 5.2 THOU/MM3 (ref 4.8–10.8)
WBC # BLD: 5.4 THOU/MM3 (ref 4.8–10.8)
WBC # BLD: 5.6 THOU/MM3 (ref 4.8–10.8)
WBC # BLD: 5.9 THOU/MM3 (ref 4.8–10.8)
WBC # BLD: 6.1 THOU/MM3 (ref 4.8–10.8)
WBC # BLD: 8.4 THOU/MM3 (ref 4.8–10.8)
WBC UA: ABNORMAL /HPF
WBC UA: NORMAL /HPF
WBC UA: NORMAL /HPF
YEAST: ABNORMAL
YEAST: NORMAL
YEAST: NORMAL

## 2018-01-01 PROCEDURE — 99214 OFFICE O/P EST MOD 30 MIN: CPT | Performed by: PHYSICIAN ASSISTANT

## 2018-01-01 PROCEDURE — 99284 EMERGENCY DEPT VISIT MOD MDM: CPT

## 2018-01-01 PROCEDURE — 85025 COMPLETE CBC W/AUTO DIFF WBC: CPT

## 2018-01-01 PROCEDURE — 2580000003 HC RX 258: Performed by: INTERNAL MEDICINE

## 2018-01-01 PROCEDURE — G0463 HOSPITAL OUTPT CLINIC VISIT: HCPCS

## 2018-01-01 PROCEDURE — 81003 URINALYSIS AUTO W/O SCOPE: CPT

## 2018-01-01 PROCEDURE — 36415 COLL VENOUS BLD VENIPUNCTURE: CPT

## 2018-01-01 PROCEDURE — 6370000000 HC RX 637 (ALT 250 FOR IP): Performed by: INTERNAL MEDICINE

## 2018-01-01 PROCEDURE — 1036F TOBACCO NON-USER: CPT | Performed by: INTERNAL MEDICINE

## 2018-01-01 PROCEDURE — 86900 BLOOD TYPING SEROLOGIC ABO: CPT

## 2018-01-01 PROCEDURE — 83690 ASSAY OF LIPASE: CPT

## 2018-01-01 PROCEDURE — 99215 OFFICE O/P EST HI 40 MIN: CPT | Performed by: INTERNAL MEDICINE

## 2018-01-01 PROCEDURE — 99223 1ST HOSP IP/OBS HIGH 75: CPT | Performed by: INTERNAL MEDICINE

## 2018-01-01 PROCEDURE — 86850 RBC ANTIBODY SCREEN: CPT

## 2018-01-01 PROCEDURE — 93005 ELECTROCARDIOGRAM TRACING: CPT | Performed by: EMERGENCY MEDICINE

## 2018-01-01 PROCEDURE — 93010 ELECTROCARDIOGRAM REPORT: CPT | Performed by: INTERNAL MEDICINE

## 2018-01-01 PROCEDURE — 1123F ACP DISCUSS/DSCN MKR DOCD: CPT | Performed by: PHYSICIAN ASSISTANT

## 2018-01-01 PROCEDURE — 99213 OFFICE O/P EST LOW 20 MIN: CPT | Performed by: NURSE PRACTITIONER

## 2018-01-01 PROCEDURE — 6360000002 HC RX W HCPCS: Performed by: INTERNAL MEDICINE

## 2018-01-01 PROCEDURE — 3430000000 HC RX DIAGNOSTIC RADIOPHARMACEUTICAL: Performed by: INTERNAL MEDICINE

## 2018-01-01 PROCEDURE — 74177 CT ABD & PELVIS W/CONTRAST: CPT

## 2018-01-01 PROCEDURE — 97165 OT EVAL LOW COMPLEX 30 MIN: CPT

## 2018-01-01 PROCEDURE — 84132 ASSAY OF SERUM POTASSIUM: CPT

## 2018-01-01 PROCEDURE — 80307 DRUG TEST PRSMV CHEM ANLYZR: CPT

## 2018-01-01 PROCEDURE — 86901 BLOOD TYPING SEROLOGIC RH(D): CPT

## 2018-01-01 PROCEDURE — 84153 ASSAY OF PSA TOTAL: CPT

## 2018-01-01 PROCEDURE — 97110 THERAPEUTIC EXERCISES: CPT

## 2018-01-01 PROCEDURE — 3017F COLORECTAL CA SCREEN DOC REV: CPT | Performed by: INTERNAL MEDICINE

## 2018-01-01 PROCEDURE — 2580000003 HC RX 258: Performed by: FAMILY MEDICINE

## 2018-01-01 PROCEDURE — 74183 MRI ABD W/O CNTR FLWD CNTR: CPT

## 2018-01-01 PROCEDURE — 96377 APPLICATON ON-BODY INJECTOR: CPT

## 2018-01-01 PROCEDURE — 36430 TRANSFUSION BLD/BLD COMPNT: CPT

## 2018-01-01 PROCEDURE — 99285 EMERGENCY DEPT VISIT HI MDM: CPT

## 2018-01-01 PROCEDURE — 99214 OFFICE O/P EST MOD 30 MIN: CPT | Performed by: INTERNAL MEDICINE

## 2018-01-01 PROCEDURE — 1101F PT FALLS ASSESS-DOCD LE1/YR: CPT | Performed by: INTERNAL MEDICINE

## 2018-01-01 PROCEDURE — 2709999900 HC NON-CHARGEABLE SUPPLY

## 2018-01-01 PROCEDURE — 87077 CULTURE AEROBIC IDENTIFY: CPT

## 2018-01-01 PROCEDURE — 97116 GAIT TRAINING THERAPY: CPT

## 2018-01-01 PROCEDURE — 71046 X-RAY EXAM CHEST 2 VIEWS: CPT

## 2018-01-01 PROCEDURE — G8427 DOCREV CUR MEDS BY ELIG CLIN: HCPCS | Performed by: INTERNAL MEDICINE

## 2018-01-01 PROCEDURE — 4040F PNEUMOC VAC/ADMIN/RCVD: CPT | Performed by: INTERNAL MEDICINE

## 2018-01-01 PROCEDURE — 36591 DRAW BLOOD OFF VENOUS DEVICE: CPT

## 2018-01-01 PROCEDURE — 83540 ASSAY OF IRON: CPT

## 2018-01-01 PROCEDURE — 86923 COMPATIBILITY TEST ELECTRIC: CPT

## 2018-01-01 PROCEDURE — 1200000003 HC TELEMETRY R&B

## 2018-01-01 PROCEDURE — 71260 CT THORAX DX C+: CPT

## 2018-01-01 PROCEDURE — 80047 BASIC METABLC PNL IONIZED CA: CPT

## 2018-01-01 PROCEDURE — 96360 HYDRATION IV INFUSION INIT: CPT

## 2018-01-01 PROCEDURE — 85045 AUTOMATED RETICULOCYTE COUNT: CPT

## 2018-01-01 PROCEDURE — 1123F ACP DISCUSS/DSCN MKR DOCD: CPT | Performed by: INTERNAL MEDICINE

## 2018-01-01 PROCEDURE — G0480 DRUG TEST DEF 1-7 CLASSES: HCPCS

## 2018-01-01 PROCEDURE — 96367 TX/PROPH/DG ADDL SEQ IV INF: CPT

## 2018-01-01 PROCEDURE — G8598 ASA/ANTIPLAT THER USED: HCPCS | Performed by: INTERNAL MEDICINE

## 2018-01-01 PROCEDURE — 83880 ASSAY OF NATRIURETIC PEPTIDE: CPT

## 2018-01-01 PROCEDURE — 1036F TOBACCO NON-USER: CPT | Performed by: SURGERY

## 2018-01-01 PROCEDURE — 6360000004 HC RX CONTRAST MEDICATION: Performed by: EMERGENCY MEDICINE

## 2018-01-01 PROCEDURE — P9016 RBC LEUKOCYTES REDUCED: HCPCS

## 2018-01-01 PROCEDURE — 82746 ASSAY OF FOLIC ACID SERUM: CPT

## 2018-01-01 PROCEDURE — 99211 OFF/OP EST MAY X REQ PHY/QHP: CPT

## 2018-01-01 PROCEDURE — 96413 CHEMO IV INFUSION 1 HR: CPT

## 2018-01-01 PROCEDURE — 82248 BILIRUBIN DIRECT: CPT

## 2018-01-01 PROCEDURE — 82330 ASSAY OF CALCIUM: CPT

## 2018-01-01 PROCEDURE — 2500000003 HC RX 250 WO HCPCS: Performed by: INTERNAL MEDICINE

## 2018-01-01 PROCEDURE — P9040 RBC LEUKOREDUCED IRRADIATED: HCPCS

## 2018-01-01 PROCEDURE — 99214 OFFICE O/P EST MOD 30 MIN: CPT | Performed by: NURSE PRACTITIONER

## 2018-01-01 PROCEDURE — 51798 US URINE CAPACITY MEASURE: CPT | Performed by: NURSE PRACTITIONER

## 2018-01-01 PROCEDURE — 6360000004 HC RX CONTRAST MEDICATION: Performed by: INTERNAL MEDICINE

## 2018-01-01 PROCEDURE — 99213 OFFICE O/P EST LOW 20 MIN: CPT | Performed by: INTERNAL MEDICINE

## 2018-01-01 PROCEDURE — 71045 X-RAY EXAM CHEST 1 VIEW: CPT

## 2018-01-01 PROCEDURE — 96374 THER/PROPH/DIAG INJ IV PUSH: CPT

## 2018-01-01 PROCEDURE — 96402 CHEMO HORMON ANTINEOPL SQ/IM: CPT

## 2018-01-01 PROCEDURE — 96402 CHEMO HORMON ANTINEOPL SQ/IM: CPT | Performed by: NURSE PRACTITIONER

## 2018-01-01 PROCEDURE — 84443 ASSAY THYROID STIM HORMONE: CPT

## 2018-01-01 PROCEDURE — 99225 PR SBSQ OBSERVATION CARE/DAY 25 MINUTES: CPT | Performed by: PSYCHIATRY & NEUROLOGY

## 2018-01-01 PROCEDURE — G8420 CALC BMI NORM PARAMETERS: HCPCS | Performed by: SURGERY

## 2018-01-01 PROCEDURE — 84466 ASSAY OF TRANSFERRIN: CPT

## 2018-01-01 PROCEDURE — 2580000003 HC RX 258: Performed by: PHYSICIAN ASSISTANT

## 2018-01-01 PROCEDURE — 85027 COMPLETE CBC AUTOMATED: CPT

## 2018-01-01 PROCEDURE — 81003 URINALYSIS AUTO W/O SCOPE: CPT | Performed by: NURSE PRACTITIONER

## 2018-01-01 PROCEDURE — 6360000002 HC RX W HCPCS

## 2018-01-01 PROCEDURE — 84439 ASSAY OF FREE THYROXINE: CPT

## 2018-01-01 PROCEDURE — G8979 MOBILITY GOAL STATUS: HCPCS

## 2018-01-01 PROCEDURE — 96401 CHEMO ANTI-NEOPL SQ/IM: CPT | Performed by: NURSE PRACTITIONER

## 2018-01-01 PROCEDURE — 99232 SBSQ HOSP IP/OBS MODERATE 35: CPT | Performed by: PHYSICIAN ASSISTANT

## 2018-01-01 PROCEDURE — A9500 TC99M SESTAMIBI: HCPCS | Performed by: INTERNAL MEDICINE

## 2018-01-01 PROCEDURE — 1200000000 HC SEMI PRIVATE

## 2018-01-01 PROCEDURE — G8419 CALC BMI OUT NRM PARAM NOF/U: HCPCS | Performed by: INTERNAL MEDICINE

## 2018-01-01 PROCEDURE — 81256 HFE GENE: CPT

## 2018-01-01 PROCEDURE — 80053 COMPREHEN METABOLIC PANEL: CPT

## 2018-01-01 PROCEDURE — 80048 BASIC METABOLIC PNL TOTAL CA: CPT

## 2018-01-01 PROCEDURE — G8420 CALC BMI NORM PARAMETERS: HCPCS | Performed by: INTERNAL MEDICINE

## 2018-01-01 PROCEDURE — 87184 SC STD DISK METHOD PER PLATE: CPT

## 2018-01-01 PROCEDURE — 97535 SELF CARE MNGMENT TRAINING: CPT

## 2018-01-01 PROCEDURE — 96375 TX/PRO/DX INJ NEW DRUG ADDON: CPT

## 2018-01-01 PROCEDURE — 72125 CT NECK SPINE W/O DYE: CPT

## 2018-01-01 PROCEDURE — G0378 HOSPITAL OBSERVATION PER HR: HCPCS

## 2018-01-01 PROCEDURE — 96372 THER/PROPH/DIAG INJ SC/IM: CPT

## 2018-01-01 PROCEDURE — 97166 OT EVAL MOD COMPLEX 45 MIN: CPT

## 2018-01-01 PROCEDURE — 87086 URINE CULTURE/COLONY COUNT: CPT

## 2018-01-01 PROCEDURE — 83735 ASSAY OF MAGNESIUM: CPT

## 2018-01-01 PROCEDURE — 99233 SBSQ HOSP IP/OBS HIGH 50: CPT | Performed by: INTERNAL MEDICINE

## 2018-01-01 PROCEDURE — G8484 FLU IMMUNIZE NO ADMIN: HCPCS | Performed by: INTERNAL MEDICINE

## 2018-01-01 PROCEDURE — 99232 SBSQ HOSP IP/OBS MODERATE 35: CPT | Performed by: INTERNAL MEDICINE

## 2018-01-01 PROCEDURE — 93005 ELECTROCARDIOGRAM TRACING: CPT | Performed by: PHYSICIAN ASSISTANT

## 2018-01-01 PROCEDURE — 99213 OFFICE O/P EST LOW 20 MIN: CPT | Performed by: PHYSICIAN ASSISTANT

## 2018-01-01 PROCEDURE — 99219 PR INITIAL OBSERVATION CARE/DAY 50 MINUTES: CPT | Performed by: PSYCHIATRY & NEUROLOGY

## 2018-01-01 PROCEDURE — 85730 THROMBOPLASTIN TIME PARTIAL: CPT

## 2018-01-01 PROCEDURE — 99214 OFFICE O/P EST MOD 30 MIN: CPT | Performed by: UROLOGY

## 2018-01-01 PROCEDURE — 87186 SC STD MICRODIL/AGAR DIL: CPT

## 2018-01-01 PROCEDURE — 99231 SBSQ HOSP IP/OBS SF/LOW 25: CPT | Performed by: PHYSICIAN ASSISTANT

## 2018-01-01 PROCEDURE — 80076 HEPATIC FUNCTION PANEL: CPT

## 2018-01-01 PROCEDURE — 74018 RADEX ABDOMEN 1 VIEW: CPT

## 2018-01-01 PROCEDURE — 84484 ASSAY OF TROPONIN QUANT: CPT

## 2018-01-01 PROCEDURE — 85610 PROTHROMBIN TIME: CPT

## 2018-01-01 PROCEDURE — 83605 ASSAY OF LACTIC ACID: CPT

## 2018-01-01 PROCEDURE — S0028 INJECTION, FAMOTIDINE, 20 MG: HCPCS | Performed by: INTERNAL MEDICINE

## 2018-01-01 PROCEDURE — 82948 REAGENT STRIP/BLOOD GLUCOSE: CPT

## 2018-01-01 PROCEDURE — 2580000003 HC RX 258: Performed by: NURSE ANESTHETIST, CERTIFIED REGISTERED

## 2018-01-01 PROCEDURE — 99205 OFFICE O/P NEW HI 60 MIN: CPT | Performed by: INTERNAL MEDICINE

## 2018-01-01 PROCEDURE — 99999 PR OFFICE/OUTPT VISIT,PROCEDURE ONLY: CPT | Performed by: NURSE PRACTITIONER

## 2018-01-01 PROCEDURE — A9503 TC99M MEDRONATE: HCPCS | Performed by: INTERNAL MEDICINE

## 2018-01-01 PROCEDURE — 93017 CV STRESS TEST TRACING ONLY: CPT

## 2018-01-01 PROCEDURE — G8427 DOCREV CUR MEDS BY ELIG CLIN: HCPCS | Performed by: PHYSICIAN ASSISTANT

## 2018-01-01 PROCEDURE — 70544 MR ANGIOGRAPHY HEAD W/O DYE: CPT

## 2018-01-01 PROCEDURE — 6360000004 HC RX CONTRAST MEDICATION: Performed by: PHYSICIAN ASSISTANT

## 2018-01-01 PROCEDURE — 93971 EXTREMITY STUDY: CPT

## 2018-01-01 PROCEDURE — 1101F PT FALLS ASSESS-DOCD LE1/YR: CPT | Performed by: PHYSICIAN ASSISTANT

## 2018-01-01 PROCEDURE — 87493 C DIFF AMPLIFIED PROBE: CPT

## 2018-01-01 PROCEDURE — G8988 SELF CARE GOAL STATUS: HCPCS

## 2018-01-01 PROCEDURE — 1111F DSCHRG MED/CURRENT MED MERGE: CPT | Performed by: INTERNAL MEDICINE

## 2018-01-01 PROCEDURE — 6360000004 HC RX CONTRAST MEDICATION: Performed by: PSYCHIATRY & NEUROLOGY

## 2018-01-01 PROCEDURE — 82306 VITAMIN D 25 HYDROXY: CPT

## 2018-01-01 PROCEDURE — 93005 ELECTROCARDIOGRAM TRACING: CPT | Performed by: FAMILY MEDICINE

## 2018-01-01 PROCEDURE — G8428 CUR MEDS NOT DOCUMENT: HCPCS | Performed by: INTERNAL MEDICINE

## 2018-01-01 PROCEDURE — G8420 CALC BMI NORM PARAMETERS: HCPCS | Performed by: NURSE PRACTITIONER

## 2018-01-01 PROCEDURE — 3600000012 HC SURGERY LEVEL 2 ADDTL 15MIN: Performed by: SURGERY

## 2018-01-01 PROCEDURE — 99238 HOSP IP/OBS DSCHRG MGMT 30/<: CPT | Performed by: NURSE PRACTITIONER

## 2018-01-01 PROCEDURE — 99213 OFFICE O/P EST LOW 20 MIN: CPT | Performed by: SURGERY

## 2018-01-01 PROCEDURE — 80061 LIPID PANEL: CPT

## 2018-01-01 PROCEDURE — 71275 CT ANGIOGRAPHY CHEST: CPT

## 2018-01-01 PROCEDURE — 1036F TOBACCO NON-USER: CPT | Performed by: PHYSICIAN ASSISTANT

## 2018-01-01 PROCEDURE — 96366 THER/PROPH/DIAG IV INF ADDON: CPT

## 2018-01-01 PROCEDURE — 99231 SBSQ HOSP IP/OBS SF/LOW 25: CPT | Performed by: INTERNAL MEDICINE

## 2018-01-01 PROCEDURE — G8427 DOCREV CUR MEDS BY ELIG CLIN: HCPCS | Performed by: SURGERY

## 2018-01-01 PROCEDURE — 96361 HYDRATE IV INFUSION ADD-ON: CPT

## 2018-01-01 PROCEDURE — 84145 PROCALCITONIN (PCT): CPT

## 2018-01-01 PROCEDURE — 99225 PR SBSQ OBSERVATION CARE/DAY 25 MINUTES: CPT | Performed by: NURSE PRACTITIONER

## 2018-01-01 PROCEDURE — 72156 MRI NECK SPINE W/O & W/DYE: CPT

## 2018-01-01 PROCEDURE — 73660 X-RAY EXAM OF TOE(S): CPT

## 2018-01-01 PROCEDURE — 1123F ACP DISCUSS/DSCN MKR DOCD: CPT | Performed by: SURGERY

## 2018-01-01 PROCEDURE — 6360000002 HC RX W HCPCS: Performed by: ANESTHESIOLOGY

## 2018-01-01 PROCEDURE — 99222 1ST HOSP IP/OBS MODERATE 55: CPT | Performed by: PHYSICIAN ASSISTANT

## 2018-01-01 PROCEDURE — 82550 ASSAY OF CK (CPK): CPT

## 2018-01-01 PROCEDURE — 95819 EEG AWAKE AND ASLEEP: CPT

## 2018-01-01 PROCEDURE — 77001 FLUOROGUIDE FOR VEIN DEVICE: CPT | Performed by: SURGERY

## 2018-01-01 PROCEDURE — 96365 THER/PROPH/DIAG IV INF INIT: CPT

## 2018-01-01 PROCEDURE — 2580000003 HC RX 258: Performed by: EMERGENCY MEDICINE

## 2018-01-01 PROCEDURE — 70486 CT MAXILLOFACIAL W/O DYE: CPT

## 2018-01-01 PROCEDURE — 78452 HT MUSCLE IMAGE SPECT MULT: CPT

## 2018-01-01 PROCEDURE — 4040F PNEUMOC VAC/ADMIN/RCVD: CPT | Performed by: PHYSICIAN ASSISTANT

## 2018-01-01 PROCEDURE — 81001 URINALYSIS AUTO W/SCOPE: CPT

## 2018-01-01 PROCEDURE — 82607 VITAMIN B-12: CPT

## 2018-01-01 PROCEDURE — 97530 THERAPEUTIC ACTIVITIES: CPT

## 2018-01-01 PROCEDURE — G8482 FLU IMMUNIZE ORDER/ADMIN: HCPCS | Performed by: NURSE PRACTITIONER

## 2018-01-01 PROCEDURE — G8420 CALC BMI NORM PARAMETERS: HCPCS | Performed by: PHYSICIAN ASSISTANT

## 2018-01-01 PROCEDURE — 6360000002 HC RX W HCPCS: Performed by: NURSE ANESTHETIST, CERTIFIED REGISTERED

## 2018-01-01 PROCEDURE — G8978 MOBILITY CURRENT STATUS: HCPCS

## 2018-01-01 PROCEDURE — 70450 CT HEAD/BRAIN W/O DYE: CPT

## 2018-01-01 PROCEDURE — 77001 FLUOROGUIDE FOR VEIN DEVICE: CPT

## 2018-01-01 PROCEDURE — A9579 GAD-BASE MR CONTRAST NOS,1ML: HCPCS | Performed by: PSYCHIATRY & NEUROLOGY

## 2018-01-01 PROCEDURE — 99222 1ST HOSP IP/OBS MODERATE 55: CPT | Performed by: INTERNAL MEDICINE

## 2018-01-01 PROCEDURE — 1036F TOBACCO NON-USER: CPT | Performed by: NURSE PRACTITIONER

## 2018-01-01 PROCEDURE — 81003 URINALYSIS AUTO W/O SCOPE: CPT | Performed by: UROLOGY

## 2018-01-01 PROCEDURE — 1101F PT FALLS ASSESS-DOCD LE1/YR: CPT | Performed by: SURGERY

## 2018-01-01 PROCEDURE — 84100 ASSAY OF PHOSPHORUS: CPT

## 2018-01-01 PROCEDURE — 96376 TX/PRO/DX INJ SAME DRUG ADON: CPT

## 2018-01-01 PROCEDURE — 36561 INSERT TUNNELED CV CATH: CPT | Performed by: SURGERY

## 2018-01-01 PROCEDURE — G8419 CALC BMI OUT NRM PARAM NOF/U: HCPCS | Performed by: PHYSICIAN ASSISTANT

## 2018-01-01 PROCEDURE — 70551 MRI BRAIN STEM W/O DYE: CPT

## 2018-01-01 PROCEDURE — 3017F COLORECTAL CA SCREEN DOC REV: CPT | Performed by: SURGERY

## 2018-01-01 PROCEDURE — 84403 ASSAY OF TOTAL TESTOSTERONE: CPT

## 2018-01-01 PROCEDURE — 3017F COLORECTAL CA SCREEN DOC REV: CPT | Performed by: PHYSICIAN ASSISTANT

## 2018-01-01 PROCEDURE — C1788 PORT, INDWELLING, IMP: HCPCS | Performed by: SURGERY

## 2018-01-01 PROCEDURE — 7100000011 HC PHASE II RECOVERY - ADDTL 15 MIN: Performed by: SURGERY

## 2018-01-01 PROCEDURE — 2500000003 HC RX 250 WO HCPCS: Performed by: SURGERY

## 2018-01-01 PROCEDURE — G8427 DOCREV CUR MEDS BY ELIG CLIN: HCPCS | Performed by: NURSE PRACTITIONER

## 2018-01-01 PROCEDURE — 99239 HOSP IP/OBS DSCHRG MGMT >30: CPT | Performed by: INTERNAL MEDICINE

## 2018-01-01 PROCEDURE — 4040F PNEUMOC VAC/ADMIN/RCVD: CPT | Performed by: SURGERY

## 2018-01-01 PROCEDURE — 3700000000 HC ANESTHESIA ATTENDED CARE: Performed by: SURGERY

## 2018-01-01 PROCEDURE — 2580000003 HC RX 258: Performed by: SURGERY

## 2018-01-01 PROCEDURE — 3017F COLORECTAL CA SCREEN DOC REV: CPT | Performed by: NURSE PRACTITIONER

## 2018-01-01 PROCEDURE — 3700000001 HC ADD 15 MINUTES (ANESTHESIA): Performed by: SURGERY

## 2018-01-01 PROCEDURE — 87040 BLOOD CULTURE FOR BACTERIA: CPT

## 2018-01-01 PROCEDURE — G8428 CUR MEDS NOT DOCUMENT: HCPCS | Performed by: PHYSICIAN ASSISTANT

## 2018-01-01 PROCEDURE — G8989 SELF CARE D/C STATUS: HCPCS

## 2018-01-01 PROCEDURE — G8980 MOBILITY D/C STATUS: HCPCS

## 2018-01-01 PROCEDURE — 82728 ASSAY OF FERRITIN: CPT

## 2018-01-01 PROCEDURE — 83550 IRON BINDING TEST: CPT

## 2018-01-01 PROCEDURE — 6360000004 HC RX CONTRAST MEDICATION: Performed by: NURSE PRACTITIONER

## 2018-01-01 PROCEDURE — 97161 PT EVAL LOW COMPLEX 20 MIN: CPT

## 2018-01-01 PROCEDURE — 6360000002 HC RX W HCPCS: Performed by: PHYSICIAN ASSISTANT

## 2018-01-01 PROCEDURE — 74176 CT ABD & PELVIS W/O CONTRAST: CPT

## 2018-01-01 PROCEDURE — 1111F DSCHRG MED/CURRENT MED MERGE: CPT | Performed by: PHYSICIAN ASSISTANT

## 2018-01-01 PROCEDURE — 6360000002 HC RX W HCPCS: Performed by: EMERGENCY MEDICINE

## 2018-01-01 PROCEDURE — 93005 ELECTROCARDIOGRAM TRACING: CPT | Performed by: INTERNAL MEDICINE

## 2018-01-01 PROCEDURE — 99203 OFFICE O/P NEW LOW 30 MIN: CPT | Performed by: SURGERY

## 2018-01-01 PROCEDURE — 70549 MR ANGIOGRAPH NECK W/O&W/DYE: CPT

## 2018-01-01 PROCEDURE — 4040F PNEUMOC VAC/ADMIN/RCVD: CPT | Performed by: NURSE PRACTITIONER

## 2018-01-01 PROCEDURE — 6360000002 HC RX W HCPCS: Performed by: FAMILY MEDICINE

## 2018-01-01 PROCEDURE — 99220 PR INITIAL OBSERVATION CARE/DAY 70 MINUTES: CPT | Performed by: INTERNAL MEDICINE

## 2018-01-01 PROCEDURE — 7100000010 HC PHASE II RECOVERY - FIRST 15 MIN: Performed by: SURGERY

## 2018-01-01 PROCEDURE — 78306 BONE IMAGING WHOLE BODY: CPT

## 2018-01-01 PROCEDURE — A9581 GADOXETATE DISODIUM INJ: HCPCS | Performed by: INTERNAL MEDICINE

## 2018-01-01 PROCEDURE — 3600000002 HC SURGERY LEVEL 2 BASE: Performed by: SURGERY

## 2018-01-01 PROCEDURE — G8419 CALC BMI OUT NRM PARAM NOF/U: HCPCS | Performed by: SURGERY

## 2018-01-01 PROCEDURE — 84550 ASSAY OF BLOOD/URIC ACID: CPT

## 2018-01-01 PROCEDURE — 93005 ELECTROCARDIOGRAM TRACING: CPT | Performed by: NURSE PRACTITIONER

## 2018-01-01 PROCEDURE — G8987 SELF CARE CURRENT STATUS: HCPCS

## 2018-01-01 PROCEDURE — 1111F DSCHRG MED/CURRENT MED MERGE: CPT | Performed by: SURGERY

## 2018-01-01 PROCEDURE — 94640 AIRWAY INHALATION TREATMENT: CPT

## 2018-01-01 PROCEDURE — 93306 TTE W/DOPPLER COMPLETE: CPT

## 2018-01-01 PROCEDURE — 99999 PR OFFICE/OUTPT VISIT,PROCEDURE ONLY: CPT | Performed by: UROLOGY

## 2018-01-01 PROCEDURE — 1123F ACP DISCUSS/DSCN MKR DOCD: CPT | Performed by: NURSE PRACTITIONER

## 2018-01-01 PROCEDURE — 99283 EMERGENCY DEPT VISIT LOW MDM: CPT

## 2018-01-01 PROCEDURE — 87804 INFLUENZA ASSAY W/OPTIC: CPT

## 2018-01-01 PROCEDURE — 6370000000 HC RX 637 (ALT 250 FOR IP): Performed by: NURSE PRACTITIONER

## 2018-01-01 PROCEDURE — A9579 GAD-BASE MR CONTRAST NOS,1ML: HCPCS | Performed by: EMERGENCY MEDICINE

## 2018-01-01 PROCEDURE — 82140 ASSAY OF AMMONIA: CPT

## 2018-01-01 PROCEDURE — G8598 ASA/ANTIPLAT THER USED: HCPCS | Performed by: PHYSICIAN ASSISTANT

## 2018-01-01 PROCEDURE — G8484 FLU IMMUNIZE NO ADMIN: HCPCS | Performed by: PHYSICIAN ASSISTANT

## 2018-01-01 DEVICE — PORT INFUS OD2.7MM ID1.5MM INTRO 8FR TI POLYUR CATH DETACH CT80STPD] ANGIODYNAMICS INC]: Type: IMPLANTABLE DEVICE | Site: CHEST | Status: FUNCTIONAL

## 2018-01-01 RX ORDER — METHADONE HYDROCHLORIDE 5 MG/1
5 TABLET ORAL EVERY 6 HOURS
Qty: 90 TABLET | Refills: 0 | Status: SHIPPED | OUTPATIENT
Start: 2018-01-01 | End: 2018-01-01 | Stop reason: SDUPTHER

## 2018-01-01 RX ORDER — SODIUM CHLORIDE 9 MG/ML
INJECTION, SOLUTION INTRAVENOUS ONCE
Status: COMPLETED | OUTPATIENT
Start: 2018-01-01 | End: 2018-01-01

## 2018-01-01 RX ORDER — SODIUM CHLORIDE 9 MG/ML
INJECTION, SOLUTION INTRAVENOUS CONTINUOUS
Status: ACTIVE | OUTPATIENT
Start: 2018-01-01 | End: 2018-01-01

## 2018-01-01 RX ORDER — SODIUM CHLORIDE 0.9 % (FLUSH) 0.9 %
20 SYRINGE (ML) INJECTION PRN
Status: CANCELLED | OUTPATIENT
Start: 2018-01-01

## 2018-01-01 RX ORDER — DIPHENHYDRAMINE HYDROCHLORIDE 50 MG/ML
50 INJECTION INTRAMUSCULAR; INTRAVENOUS ONCE
Status: CANCELLED | OUTPATIENT
Start: 2018-01-01

## 2018-01-01 RX ORDER — SODIUM CHLORIDE 9 MG/ML
INJECTION, SOLUTION INTRAVENOUS CONTINUOUS
Status: CANCELLED | OUTPATIENT
Start: 2018-01-01

## 2018-01-01 RX ORDER — SODIUM CHLORIDE 9 MG/ML
INJECTION, SOLUTION INTRAVENOUS CONTINUOUS
Status: DISCONTINUED | OUTPATIENT
Start: 2018-01-01 | End: 2018-01-01

## 2018-01-01 RX ORDER — METHADONE HYDROCHLORIDE 10 MG/1
5 TABLET ORAL EVERY 6 HOURS PRN
Status: DISCONTINUED | OUTPATIENT
Start: 2018-01-01 | End: 2018-01-01 | Stop reason: HOSPADM

## 2018-01-01 RX ORDER — PREDNISONE 10 MG/1
10 TABLET ORAL DAILY
Qty: 21 TABLET | Refills: 3 | Status: CANCELLED | OUTPATIENT
Start: 2018-01-01 | End: 2018-01-01

## 2018-01-01 RX ORDER — SODIUM CHLORIDE 0.9 % (FLUSH) 0.9 %
10 SYRINGE (ML) INJECTION PRN
Status: CANCELLED | OUTPATIENT
Start: 2018-01-01

## 2018-01-01 RX ORDER — SODIUM CHLORIDE 0.9 % (FLUSH) 0.9 %
10 SYRINGE (ML) INJECTION PRN
Status: DISCONTINUED | OUTPATIENT
Start: 2018-01-01 | End: 2018-01-01 | Stop reason: HOSPADM

## 2018-01-01 RX ORDER — SULFAMETHOXAZOLE AND TRIMETHOPRIM 800; 160 MG/1; MG/1
1 TABLET ORAL 2 TIMES DAILY
Qty: 20 TABLET | Refills: 0 | Status: SHIPPED | OUTPATIENT
Start: 2018-01-01 | End: 2018-01-01

## 2018-01-01 RX ORDER — HEPARIN SODIUM (PORCINE) LOCK FLUSH IV SOLN 100 UNIT/ML 100 UNIT/ML
500 SOLUTION INTRAVENOUS ONCE
Status: COMPLETED | OUTPATIENT
Start: 2018-01-01 | End: 2018-01-01

## 2018-01-01 RX ORDER — HEPARIN SODIUM (PORCINE) LOCK FLUSH IV SOLN 100 UNIT/ML 100 UNIT/ML
500 SOLUTION INTRAVENOUS PRN
Status: DISCONTINUED | OUTPATIENT
Start: 2018-01-01 | End: 2018-01-01 | Stop reason: HOSPADM

## 2018-01-01 RX ORDER — SODIUM CHLORIDE 0.9 % (FLUSH) 0.9 %
20 SYRINGE (ML) INJECTION PRN
Status: DISCONTINUED | OUTPATIENT
Start: 2018-01-01 | End: 2018-01-01 | Stop reason: HOSPADM

## 2018-01-01 RX ORDER — 0.9 % SODIUM CHLORIDE 0.9 %
500 INTRAVENOUS SOLUTION INTRAVENOUS ONCE
Status: COMPLETED | OUTPATIENT
Start: 2018-01-01 | End: 2018-01-01

## 2018-01-01 RX ORDER — LEVOTHYROXINE SODIUM 0.12 MG/1
125 TABLET ORAL DAILY
Status: DISCONTINUED | OUTPATIENT
Start: 2018-01-01 | End: 2018-01-01 | Stop reason: HOSPADM

## 2018-01-01 RX ORDER — METHYLPREDNISOLONE SODIUM SUCCINATE 125 MG/2ML
125 INJECTION, POWDER, LYOPHILIZED, FOR SOLUTION INTRAMUSCULAR; INTRAVENOUS ONCE
Status: CANCELLED | OUTPATIENT
Start: 2018-01-01 | End: 2018-01-01

## 2018-01-01 RX ORDER — DEXAMETHASONE 4 MG/1
TABLET ORAL
Qty: 12 TABLET | Refills: 2 | Status: SHIPPED | OUTPATIENT
Start: 2018-01-01

## 2018-01-01 RX ORDER — LORAZEPAM 2 MG/ML
2 CONCENTRATE ORAL EVERY 4 HOURS PRN
Status: DISCONTINUED | OUTPATIENT
Start: 2018-01-01 | End: 2018-01-01

## 2018-01-01 RX ORDER — 0.9 % SODIUM CHLORIDE 0.9 %
250 INTRAVENOUS SOLUTION INTRAVENOUS ONCE
Status: COMPLETED | OUTPATIENT
Start: 2018-01-01 | End: 2018-01-01

## 2018-01-01 RX ORDER — PRAVASTATIN SODIUM 40 MG
40 TABLET ORAL NIGHTLY
Status: DISCONTINUED | OUTPATIENT
Start: 2018-01-01 | End: 2018-01-01 | Stop reason: HOSPADM

## 2018-01-01 RX ORDER — METHADONE HYDROCHLORIDE 5 MG/1
5 TABLET ORAL EVERY 6 HOURS PRN
Qty: 90 TABLET | Refills: 0 | Status: SHIPPED | OUTPATIENT
Start: 2018-01-01 | End: 2018-01-01

## 2018-01-01 RX ORDER — HEPARIN SODIUM (PORCINE) LOCK FLUSH IV SOLN 100 UNIT/ML 100 UNIT/ML
500 SOLUTION INTRAVENOUS PRN
Status: CANCELLED | OUTPATIENT
Start: 2018-01-01

## 2018-01-01 RX ORDER — 0.9 % SODIUM CHLORIDE 0.9 %
500 INTRAVENOUS SOLUTION INTRAVENOUS ONCE
Status: CANCELLED | OUTPATIENT
Start: 2018-01-01 | End: 2018-01-01

## 2018-01-01 RX ORDER — MORPHINE SULFATE 20 MG/ML
15 SOLUTION ORAL EVERY 4 HOURS PRN
Status: DISCONTINUED | OUTPATIENT
Start: 2018-01-01 | End: 2018-01-01 | Stop reason: HOSPADM

## 2018-01-01 RX ORDER — SODIUM CHLORIDE 0.9 % (FLUSH) 0.9 %
5 SYRINGE (ML) INJECTION PRN
Status: CANCELLED | OUTPATIENT
Start: 2018-01-01

## 2018-01-01 RX ORDER — EPINEPHRINE 1 MG/ML
0.3 INJECTION, SOLUTION, CONCENTRATE INTRAVENOUS PRN
Status: CANCELLED | OUTPATIENT
Start: 2018-01-01

## 2018-01-01 RX ORDER — SODIUM CHLORIDE 0.9 % (FLUSH) 0.9 %
10 SYRINGE (ML) INJECTION EVERY 12 HOURS SCHEDULED
Status: DISCONTINUED | OUTPATIENT
Start: 2018-01-01 | End: 2018-01-01 | Stop reason: HOSPADM

## 2018-01-01 RX ORDER — MORPHINE SULFATE 4 MG/ML
4 INJECTION, SOLUTION INTRAMUSCULAR; INTRAVENOUS ONCE
Status: COMPLETED | OUTPATIENT
Start: 2018-01-01 | End: 2018-01-01

## 2018-01-01 RX ORDER — FENTANYL CITRATE 50 UG/ML
50 INJECTION, SOLUTION INTRAMUSCULAR; INTRAVENOUS EVERY 5 MIN PRN
Status: DISCONTINUED | OUTPATIENT
Start: 2018-01-01 | End: 2018-01-01 | Stop reason: HOSPADM

## 2018-01-01 RX ORDER — 0.9 % SODIUM CHLORIDE 0.9 %
250 INTRAVENOUS SOLUTION INTRAVENOUS ONCE
Status: CANCELLED
Start: 2018-01-01 | End: 2018-01-01

## 2018-01-01 RX ORDER — ACETAMINOPHEN 325 MG/1
650 TABLET ORAL EVERY 4 HOURS PRN
Status: DISCONTINUED | OUTPATIENT
Start: 2018-01-01 | End: 2018-01-01 | Stop reason: HOSPADM

## 2018-01-01 RX ORDER — ONDANSETRON 4 MG/1
4 TABLET, FILM COATED ORAL EVERY 8 HOURS PRN
Qty: 60 TABLET | Refills: 1 | Status: SHIPPED | OUTPATIENT
Start: 2018-01-01

## 2018-01-01 RX ORDER — LORAZEPAM 2 MG/ML
1.5 CONCENTRATE ORAL EVERY 4 HOURS PRN
Status: DISCONTINUED | OUTPATIENT
Start: 2018-01-01 | End: 2018-01-01

## 2018-01-01 RX ORDER — 0.9 % SODIUM CHLORIDE 0.9 %
10 VIAL (ML) INJECTION ONCE
Status: CANCELLED | OUTPATIENT
Start: 2018-01-01 | End: 2018-01-01

## 2018-01-01 RX ORDER — SODIUM CHLORIDE 9 MG/ML
INJECTION, SOLUTION INTRAVENOUS ONCE
Status: CANCELLED | OUTPATIENT
Start: 2018-01-01 | End: 2018-01-01

## 2018-01-01 RX ORDER — MORPHINE SULFATE 4 MG/ML
4 INJECTION, SOLUTION INTRAMUSCULAR; INTRAVENOUS
Status: DISCONTINUED | OUTPATIENT
Start: 2018-01-01 | End: 2018-01-01 | Stop reason: HOSPADM

## 2018-01-01 RX ORDER — LORAZEPAM 2 MG/ML
2.5 CONCENTRATE ORAL EVERY 4 HOURS PRN
Status: DISCONTINUED | OUTPATIENT
Start: 2018-01-01 | End: 2018-01-01

## 2018-01-01 RX ORDER — 0.9 % SODIUM CHLORIDE 0.9 %
10 VIAL (ML) INJECTION ONCE
Status: CANCELLED | OUTPATIENT
Start: 2018-01-01

## 2018-01-01 RX ORDER — ONDANSETRON 4 MG/1
4 TABLET, ORALLY DISINTEGRATING ORAL EVERY 8 HOURS PRN
Status: DISCONTINUED | OUTPATIENT
Start: 2018-01-01 | End: 2018-01-01 | Stop reason: HOSPADM

## 2018-01-01 RX ORDER — LIDOCAINE 40 MG/G
CREAM TOPICAL PRN
Status: DISCONTINUED | OUTPATIENT
Start: 2018-01-01 | End: 2018-01-01 | Stop reason: HOSPADM

## 2018-01-01 RX ORDER — LEVOTHYROXINE SODIUM 0.1 MG/1
100 TABLET ORAL DAILY
Status: DISCONTINUED | OUTPATIENT
Start: 2018-01-01 | End: 2018-01-01 | Stop reason: HOSPADM

## 2018-01-01 RX ORDER — LIDOCAINE AND PRILOCAINE 25; 25 MG/G; MG/G
CREAM TOPICAL
Qty: 30 G | Refills: 1 | Status: SHIPPED | OUTPATIENT
Start: 2018-01-01

## 2018-01-01 RX ORDER — POTASSIUM CHLORIDE 7.45 MG/ML
10 INJECTION INTRAVENOUS PRN
Status: DISCONTINUED | OUTPATIENT
Start: 2018-01-01 | End: 2018-01-01 | Stop reason: HOSPADM

## 2018-01-01 RX ORDER — METHADONE HYDROCHLORIDE 5 MG/1
5 TABLET ORAL EVERY 6 HOURS
COMMUNITY
End: 2018-01-01 | Stop reason: SDUPTHER

## 2018-01-01 RX ORDER — MORPHINE SULFATE 20 MG/ML
10 SOLUTION ORAL EVERY 4 HOURS PRN
Status: DISCONTINUED | OUTPATIENT
Start: 2018-01-01 | End: 2018-01-01 | Stop reason: HOSPADM

## 2018-01-01 RX ORDER — ATENOLOL 25 MG/1
25 TABLET ORAL DAILY
Status: DISCONTINUED | OUTPATIENT
Start: 2018-01-01 | End: 2018-01-01 | Stop reason: HOSPADM

## 2018-01-01 RX ORDER — ONDANSETRON 2 MG/ML
4 INJECTION INTRAMUSCULAR; INTRAVENOUS ONCE
Status: COMPLETED | OUTPATIENT
Start: 2018-01-01 | End: 2018-01-01

## 2018-01-01 RX ORDER — POTASSIUM CHLORIDE 20MEQ/15ML
40 LIQUID (ML) ORAL PRN
Status: DISCONTINUED | OUTPATIENT
Start: 2018-01-01 | End: 2018-01-01 | Stop reason: HOSPADM

## 2018-01-01 RX ORDER — PREDNISONE 1 MG/1
5 TABLET ORAL 2 TIMES DAILY
Qty: 60 TABLET | Refills: 2 | Status: SHIPPED | OUTPATIENT
Start: 2018-01-01 | End: 2018-01-01 | Stop reason: SDUPTHER

## 2018-01-01 RX ORDER — OLANZAPINE 7.5 MG/1
7.5 TABLET ORAL NIGHTLY
Qty: 30 TABLET | Refills: 3 | Status: SHIPPED | OUTPATIENT
Start: 2018-01-01

## 2018-01-01 RX ORDER — DEXAMETHASONE SODIUM PHOSPHATE 4 MG/ML
INJECTION, SOLUTION INTRA-ARTICULAR; INTRALESIONAL; INTRAMUSCULAR; INTRAVENOUS; SOFT TISSUE PRN
Status: DISCONTINUED | OUTPATIENT
Start: 2018-01-01 | End: 2018-01-01 | Stop reason: SDUPTHER

## 2018-01-01 RX ORDER — ONDANSETRON 2 MG/ML
4 INJECTION INTRAMUSCULAR; INTRAVENOUS EVERY 6 HOURS PRN
Status: DISCONTINUED | OUTPATIENT
Start: 2018-01-01 | End: 2018-01-01 | Stop reason: HOSPADM

## 2018-01-01 RX ORDER — MORPHINE SULFATE 2 MG/ML
2 INJECTION, SOLUTION INTRAMUSCULAR; INTRAVENOUS
Status: DISCONTINUED | OUTPATIENT
Start: 2018-01-01 | End: 2018-01-01 | Stop reason: HOSPADM

## 2018-01-01 RX ORDER — MORPHINE SULFATE 4 MG/ML
4 INJECTION, SOLUTION INTRAMUSCULAR; INTRAVENOUS ONCE
Status: DISCONTINUED | OUTPATIENT
Start: 2018-01-01 | End: 2018-01-01 | Stop reason: HOSPADM

## 2018-01-01 RX ORDER — HEPARIN SODIUM (PORCINE) LOCK FLUSH IV SOLN 100 UNIT/ML 100 UNIT/ML
300 SOLUTION INTRAVENOUS ONCE
Status: COMPLETED | OUTPATIENT
Start: 2018-01-01 | End: 2018-01-01

## 2018-01-01 RX ORDER — DOCUSATE SODIUM 100 MG/1
100 CAPSULE, LIQUID FILLED ORAL DAILY
Status: DISCONTINUED | OUTPATIENT
Start: 2018-01-01 | End: 2018-01-01 | Stop reason: HOSPADM

## 2018-01-01 RX ORDER — SULFAMETHOXAZOLE AND TRIMETHOPRIM 800; 160 MG/1; MG/1
1 TABLET ORAL 2 TIMES DAILY
Qty: 14 TABLET | Refills: 0 | Status: ON HOLD | OUTPATIENT
Start: 2018-01-01 | End: 2018-01-01 | Stop reason: HOSPADM

## 2018-01-01 RX ORDER — MORPHINE SULFATE 20 MG/ML
25 SOLUTION ORAL EVERY 4 HOURS PRN
Status: DISCONTINUED | OUTPATIENT
Start: 2018-01-01 | End: 2018-01-01 | Stop reason: HOSPADM

## 2018-01-01 RX ORDER — METHADONE HYDROCHLORIDE 5 MG/1
5 TABLET ORAL EVERY 6 HOURS
Qty: 120 TABLET | Refills: 0 | Status: SHIPPED | OUTPATIENT
Start: 2018-01-01 | End: 2018-01-01

## 2018-01-01 RX ORDER — 0.9 % SODIUM CHLORIDE 0.9 %
10 VIAL (ML) INJECTION ONCE
Status: COMPLETED | OUTPATIENT
Start: 2018-01-01 | End: 2018-01-01

## 2018-01-01 RX ORDER — ONDANSETRON 4 MG/1
4 TABLET, FILM COATED ORAL EVERY 8 HOURS PRN
Qty: 20 TABLET | Refills: 0 | Status: SHIPPED | OUTPATIENT
Start: 2018-01-01 | End: 2018-01-01 | Stop reason: SDUPTHER

## 2018-01-01 RX ORDER — LIDOCAINE HYDROCHLORIDE AND EPINEPHRINE 10; 10 MG/ML; UG/ML
INJECTION, SOLUTION INFILTRATION; PERINEURAL PRN
Status: DISCONTINUED | OUTPATIENT
Start: 2018-01-01 | End: 2018-01-01 | Stop reason: HOSPADM

## 2018-01-01 RX ORDER — MEPERIDINE HYDROCHLORIDE 25 MG/ML
25 INJECTION INTRAMUSCULAR; INTRAVENOUS; SUBCUTANEOUS
Status: DISCONTINUED | OUTPATIENT
Start: 2018-01-01 | End: 2018-01-01 | Stop reason: HOSPADM

## 2018-01-01 RX ORDER — METHADONE HYDROCHLORIDE 5 MG/1
5 TABLET ORAL EVERY 6 HOURS PRN
Qty: 90 TABLET | Refills: 0 | Status: SHIPPED | OUTPATIENT
Start: 2018-01-01 | End: 2018-01-01 | Stop reason: SDUPTHER

## 2018-01-01 RX ORDER — BACLOFEN 10 MG/1
10 TABLET ORAL 3 TIMES DAILY PRN
Status: DISCONTINUED | OUTPATIENT
Start: 2018-01-01 | End: 2018-01-01 | Stop reason: HOSPADM

## 2018-01-01 RX ORDER — POTASSIUM CHLORIDE 20 MEQ/1
20 TABLET, EXTENDED RELEASE ORAL ONCE
Status: COMPLETED | OUTPATIENT
Start: 2018-01-01 | End: 2018-01-01

## 2018-01-01 RX ORDER — MORPHINE SULFATE 10 MG/5ML
10 SOLUTION ORAL EVERY 4 HOURS PRN
Status: DISCONTINUED | OUTPATIENT
Start: 2018-01-01 | End: 2018-01-01 | Stop reason: ALTCHOICE

## 2018-01-01 RX ORDER — DIPHENHYDRAMINE HYDROCHLORIDE 50 MG/ML
25 INJECTION INTRAMUSCULAR; INTRAVENOUS ONCE
Status: COMPLETED | OUTPATIENT
Start: 2018-01-01 | End: 2018-01-01

## 2018-01-01 RX ORDER — FENTANYL CITRATE 50 UG/ML
25 INJECTION, SOLUTION INTRAMUSCULAR; INTRAVENOUS EVERY 5 MIN PRN
Status: DISCONTINUED | OUTPATIENT
Start: 2018-01-01 | End: 2018-01-01 | Stop reason: HOSPADM

## 2018-01-01 RX ORDER — ATENOLOL 25 MG/1
25 TABLET ORAL DAILY
COMMUNITY

## 2018-01-01 RX ORDER — POTASSIUM CHLORIDE 20 MEQ/1
20 TABLET, EXTENDED RELEASE ORAL DAILY
Qty: 10 TABLET | Refills: 1 | Status: SHIPPED | OUTPATIENT
Start: 2018-01-01

## 2018-01-01 RX ORDER — POTASSIUM CHLORIDE 20 MEQ/1
40 TABLET, EXTENDED RELEASE ORAL PRN
Status: DISCONTINUED | OUTPATIENT
Start: 2018-01-01 | End: 2018-01-01 | Stop reason: HOSPADM

## 2018-01-01 RX ORDER — ONDANSETRON 4 MG/1
4 TABLET, FILM COATED ORAL EVERY 8 HOURS PRN
Qty: 15 TABLET | Refills: 0 | Status: SHIPPED | OUTPATIENT
Start: 2018-01-01 | End: 2018-01-01 | Stop reason: SDUPTHER

## 2018-01-01 RX ORDER — ACETAMINOPHEN 500 MG
500 TABLET ORAL ONCE
Status: DISCONTINUED | OUTPATIENT
Start: 2018-01-01 | End: 2018-01-01

## 2018-01-01 RX ORDER — DIPHENHYDRAMINE HYDROCHLORIDE 50 MG/ML
50 INJECTION INTRAMUSCULAR; INTRAVENOUS ONCE
Status: CANCELLED | OUTPATIENT
Start: 2018-01-01 | End: 2018-01-01

## 2018-01-01 RX ORDER — DIPHENHYDRAMINE HYDROCHLORIDE 50 MG/ML
25 INJECTION INTRAMUSCULAR; INTRAVENOUS ONCE
Status: CANCELLED | OUTPATIENT
Start: 2018-01-01

## 2018-01-01 RX ORDER — LORAZEPAM 2 MG/ML
1 INJECTION INTRAMUSCULAR EVERY 6 HOURS PRN
Status: DISCONTINUED | OUTPATIENT
Start: 2018-01-01 | End: 2018-01-01 | Stop reason: DRUGHIGH

## 2018-01-01 RX ORDER — ONDANSETRON 4 MG/1
4 TABLET, FILM COATED ORAL EVERY 8 HOURS PRN
Status: DISCONTINUED | OUTPATIENT
Start: 2018-01-01 | End: 2018-01-01 | Stop reason: HOSPADM

## 2018-01-01 RX ORDER — PROPOFOL 10 MG/ML
INJECTION, EMULSION INTRAVENOUS PRN
Status: DISCONTINUED | OUTPATIENT
Start: 2018-01-01 | End: 2018-01-01 | Stop reason: SDUPTHER

## 2018-01-01 RX ORDER — LANOLIN ALCOHOL/MO/W.PET/CERES
100 CREAM (GRAM) TOPICAL DAILY
Status: DISCONTINUED | OUTPATIENT
Start: 2018-01-01 | End: 2018-01-01 | Stop reason: HOSPADM

## 2018-01-01 RX ORDER — LORAZEPAM 2 MG/ML
3 CONCENTRATE ORAL
Status: DISCONTINUED | OUTPATIENT
Start: 2018-01-01 | End: 2018-01-01 | Stop reason: HOSPADM

## 2018-01-01 RX ORDER — CIPROFLOXACIN 500 MG/1
500 TABLET, FILM COATED ORAL 2 TIMES DAILY
Qty: 10 TABLET | Refills: 0 | Status: SHIPPED | OUTPATIENT
Start: 2018-01-01 | End: 2018-01-01

## 2018-01-01 RX ORDER — MORPHINE SULFATE 10 MG/5ML
15 SOLUTION ORAL EVERY 4 HOURS PRN
Status: DISCONTINUED | OUTPATIENT
Start: 2018-01-01 | End: 2018-01-01 | Stop reason: ALTCHOICE

## 2018-01-01 RX ORDER — DEXAMETHASONE SODIUM PHOSPHATE 4 MG/ML
8 INJECTION, SOLUTION INTRA-ARTICULAR; INTRALESIONAL; INTRAMUSCULAR; INTRAVENOUS; SOFT TISSUE ONCE
Status: COMPLETED | OUTPATIENT
Start: 2018-01-01 | End: 2018-01-01

## 2018-01-01 RX ORDER — CHOLECALCIFEROL (VITAMIN D3) 25 MCG
1 CAPSULE ORAL DAILY
COMMUNITY

## 2018-01-01 RX ORDER — MULTIVITAMIN WITH IRON
100 TABLET ORAL DAILY
COMMUNITY

## 2018-01-01 RX ORDER — MORPHINE SULFATE 20 MG/ML
20 SOLUTION ORAL EVERY 4 HOURS PRN
Status: DISCONTINUED | OUTPATIENT
Start: 2018-01-01 | End: 2018-01-01 | Stop reason: HOSPADM

## 2018-01-01 RX ORDER — PREDNISONE 10 MG/1
10 TABLET ORAL DAILY
Qty: 21 TABLET | Refills: 3 | Status: SHIPPED | OUTPATIENT
Start: 2018-01-01 | End: 2018-01-01 | Stop reason: SDUPTHER

## 2018-01-01 RX ORDER — POTASSIUM CHLORIDE 750 MG/1
40 TABLET, FILM COATED, EXTENDED RELEASE ORAL ONCE
Status: COMPLETED | OUTPATIENT
Start: 2018-01-01 | End: 2018-01-01

## 2018-01-01 RX ORDER — MORPHINE SULFATE 20 MG/ML
30 SOLUTION ORAL EVERY 4 HOURS PRN
Status: DISCONTINUED | OUTPATIENT
Start: 2018-01-01 | End: 2018-01-01 | Stop reason: HOSPADM

## 2018-01-01 RX ORDER — DIPHENHYDRAMINE HYDROCHLORIDE 50 MG/ML
50 INJECTION INTRAMUSCULAR; INTRAVENOUS ONCE
Status: COMPLETED | OUTPATIENT
Start: 2018-01-01 | End: 2018-01-01

## 2018-01-01 RX ORDER — SULFAMETHOXAZOLE AND TRIMETHOPRIM 800; 160 MG/1; MG/1
1 TABLET ORAL EVERY 12 HOURS SCHEDULED
Status: DISCONTINUED | OUTPATIENT
Start: 2018-01-01 | End: 2018-01-01 | Stop reason: HOSPADM

## 2018-01-01 RX ORDER — POLYETHYLENE GLYCOL 3350 17 G/17G
17 POWDER, FOR SOLUTION ORAL DAILY
Status: DISCONTINUED | OUTPATIENT
Start: 2018-01-01 | End: 2018-01-01 | Stop reason: HOSPADM

## 2018-01-01 RX ORDER — POLYETHYLENE GLYCOL 3350 17 G/17G
17 POWDER, FOR SOLUTION ORAL DAILY PRN
Qty: 14 EACH | Refills: 0 | Status: SHIPPED | OUTPATIENT
Start: 2018-01-01 | End: 2018-01-01

## 2018-01-01 RX ORDER — PREDNISONE 10 MG/1
10 TABLET ORAL DAILY
Status: DISCONTINUED | OUTPATIENT
Start: 2018-01-01 | End: 2018-01-01 | Stop reason: HOSPADM

## 2018-01-01 RX ORDER — 0.9 % SODIUM CHLORIDE 0.9 %
1000 INTRAVENOUS SOLUTION INTRAVENOUS ONCE
Status: DISCONTINUED | OUTPATIENT
Start: 2018-01-01 | End: 2018-01-01 | Stop reason: HOSPADM

## 2018-01-01 RX ORDER — IPRATROPIUM BROMIDE AND ALBUTEROL SULFATE 2.5; .5 MG/3ML; MG/3ML
1 SOLUTION RESPIRATORY (INHALATION) EVERY 4 HOURS
Qty: 360 ML | Refills: 1 | Status: SHIPPED | OUTPATIENT
Start: 2018-01-01

## 2018-01-01 RX ORDER — MORPHINE SULFATE 10 MG/5ML
20 SOLUTION ORAL EVERY 4 HOURS PRN
Status: DISCONTINUED | OUTPATIENT
Start: 2018-01-01 | End: 2018-01-01 | Stop reason: ALTCHOICE

## 2018-01-01 RX ORDER — ONDANSETRON 2 MG/ML
8 INJECTION INTRAMUSCULAR; INTRAVENOUS ONCE
Status: COMPLETED | OUTPATIENT
Start: 2018-01-01 | End: 2018-01-01

## 2018-01-01 RX ORDER — PROMETHAZINE HYDROCHLORIDE 25 MG/ML
12.5 INJECTION, SOLUTION INTRAMUSCULAR; INTRAVENOUS
Status: DISCONTINUED | OUTPATIENT
Start: 2018-01-01 | End: 2018-01-01 | Stop reason: HOSPADM

## 2018-01-01 RX ORDER — BISACODYL 10 MG
10 SUPPOSITORY, RECTAL RECTAL PRN
Qty: 5 SUPPOSITORY | Refills: 1 | Status: SHIPPED | OUTPATIENT
Start: 2018-01-01 | End: 2018-01-01

## 2018-01-01 RX ORDER — M-VIT,TX,IRON,MINS/CALC/FOLIC 27MG-0.4MG
1 TABLET ORAL DAILY
Status: DISCONTINUED | OUTPATIENT
Start: 2018-01-01 | End: 2018-01-01 | Stop reason: HOSPADM

## 2018-01-01 RX ORDER — LORAZEPAM 2 MG/ML
3 CONCENTRATE ORAL EVERY 4 HOURS PRN
Status: DISCONTINUED | OUTPATIENT
Start: 2018-01-01 | End: 2018-01-01

## 2018-01-01 RX ORDER — SODIUM CHLORIDE 0.9 % (FLUSH) 0.9 %
10 SYRINGE (ML) INJECTION PRN
Status: DISCONTINUED | OUTPATIENT
Start: 2018-01-01 | End: 2018-01-01 | Stop reason: SDUPTHER

## 2018-01-01 RX ORDER — METHADONE HYDROCHLORIDE 10 MG/1
5 TABLET ORAL EVERY 6 HOURS
Status: CANCELLED | OUTPATIENT
Start: 2018-01-01

## 2018-01-01 RX ORDER — CEPHALEXIN 500 MG/1
500 CAPSULE ORAL 2 TIMES DAILY
COMMUNITY
End: 2018-01-01 | Stop reason: ALTCHOICE

## 2018-01-01 RX ORDER — PREDNISONE 1 MG/1
5 TABLET ORAL 2 TIMES DAILY
Qty: 60 TABLET | Refills: 2 | Status: SHIPPED | OUTPATIENT
Start: 2018-01-01 | End: 2018-01-01

## 2018-01-01 RX ORDER — LEVOTHYROXINE SODIUM 0.1 MG/1
100 TABLET ORAL DAILY
Qty: 30 TABLET | Refills: 0 | Status: ON HOLD | OUTPATIENT
Start: 2018-01-01 | End: 2018-01-01 | Stop reason: HOSPADM

## 2018-01-01 RX ORDER — BACLOFEN 10 MG/1
10 TABLET ORAL ONCE
Status: COMPLETED | OUTPATIENT
Start: 2018-01-01 | End: 2018-01-01

## 2018-01-01 RX ORDER — SODIUM CHLORIDE 0.9 % (FLUSH) 0.9 %
5 SYRINGE (ML) INJECTION PRN
Status: DISCONTINUED | OUTPATIENT
Start: 2018-01-01 | End: 2018-01-01 | Stop reason: HOSPADM

## 2018-01-01 RX ORDER — PREDNISONE 10 MG/1
10 TABLET ORAL DAILY
Qty: 21 TABLET | Refills: 3 | Status: SHIPPED | OUTPATIENT
Start: 2018-01-01 | End: 2018-01-01

## 2018-01-01 RX ORDER — ONDANSETRON 2 MG/ML
INJECTION INTRAMUSCULAR; INTRAVENOUS PRN
Status: DISCONTINUED | OUTPATIENT
Start: 2018-01-01 | End: 2018-01-01 | Stop reason: SDUPTHER

## 2018-01-01 RX ORDER — POTASSIUM CHLORIDE 20 MEQ/1
40 TABLET, EXTENDED RELEASE ORAL ONCE
Status: COMPLETED | OUTPATIENT
Start: 2018-01-01 | End: 2018-01-01

## 2018-01-01 RX ORDER — IPRATROPIUM BROMIDE AND ALBUTEROL SULFATE 2.5; .5 MG/3ML; MG/3ML
1 SOLUTION RESPIRATORY (INHALATION) ONCE
Status: COMPLETED | OUTPATIENT
Start: 2018-01-01 | End: 2018-01-01

## 2018-01-01 RX ORDER — 0.9 % SODIUM CHLORIDE 0.9 %
500 INTRAVENOUS SOLUTION INTRAVENOUS ONCE
Status: DISCONTINUED | OUTPATIENT
Start: 2018-01-01 | End: 2018-01-01 | Stop reason: HOSPADM

## 2018-01-01 RX ORDER — NITROFURANTOIN 25; 75 MG/1; MG/1
100 CAPSULE ORAL 2 TIMES DAILY
Qty: 14 CAPSULE | Refills: 0 | Status: SHIPPED | OUTPATIENT
Start: 2018-01-01 | End: 2018-01-01

## 2018-01-01 RX ORDER — METHADONE HYDROCHLORIDE 5 MG/1
5 TABLET ORAL EVERY 6 HOURS
Qty: 120 TABLET | Refills: 0 | Status: CANCELLED | OUTPATIENT
Start: 2018-01-01 | End: 2018-12-30

## 2018-01-01 RX ORDER — OYSTER SHELL CALCIUM WITH VITAMIN D 500; 200 MG/1; [IU]/1
1 TABLET, FILM COATED ORAL 2 TIMES DAILY
Status: DISCONTINUED | OUTPATIENT
Start: 2018-01-01 | End: 2018-01-01 | Stop reason: HOSPADM

## 2018-01-01 RX ORDER — SODIUM CHLORIDE 0.9 % (FLUSH) 0.9 %
10 SYRINGE (ML) INJECTION EVERY 12 HOURS SCHEDULED
Status: DISCONTINUED | OUTPATIENT
Start: 2018-01-01 | End: 2018-01-01 | Stop reason: SDUPTHER

## 2018-01-01 RX ORDER — MORPHINE SULFATE 10 MG/5ML
5 SOLUTION ORAL
Status: DISCONTINUED | OUTPATIENT
Start: 2018-01-01 | End: 2018-01-01

## 2018-01-01 RX ORDER — LORAZEPAM 2 MG/ML
1 CONCENTRATE ORAL
Status: DISCONTINUED | OUTPATIENT
Start: 2018-01-01 | End: 2018-01-01 | Stop reason: HOSPADM

## 2018-01-01 RX ORDER — LORAZEPAM 2 MG/ML
2 CONCENTRATE ORAL
Status: DISCONTINUED | OUTPATIENT
Start: 2018-01-01 | End: 2018-01-01 | Stop reason: HOSPADM

## 2018-01-01 RX ORDER — ONDANSETRON 2 MG/ML
4 INJECTION INTRAMUSCULAR; INTRAVENOUS EVERY 6 HOURS PRN
Status: DISCONTINUED | OUTPATIENT
Start: 2018-01-01 | End: 2018-01-01 | Stop reason: SDUPTHER

## 2018-01-01 RX ORDER — SULFAMETHOXAZOLE AND TRIMETHOPRIM 800; 160 MG/1; MG/1
1 TABLET ORAL EVERY 12 HOURS SCHEDULED
Qty: 6 TABLET | Refills: 0 | Status: SHIPPED | OUTPATIENT
Start: 2018-01-01 | End: 2018-01-01

## 2018-01-01 RX ORDER — LABETALOL HYDROCHLORIDE 5 MG/ML
5 INJECTION, SOLUTION INTRAVENOUS EVERY 10 MIN PRN
Status: DISCONTINUED | OUTPATIENT
Start: 2018-01-01 | End: 2018-01-01 | Stop reason: HOSPADM

## 2018-01-01 RX ORDER — LORAZEPAM 2 MG/ML
1.5 CONCENTRATE ORAL
Status: DISCONTINUED | OUTPATIENT
Start: 2018-01-01 | End: 2018-01-01 | Stop reason: HOSPADM

## 2018-01-01 RX ORDER — METHADONE HYDROCHLORIDE 10 MG/1
5 TABLET ORAL EVERY 12 HOURS PRN
Status: DISCONTINUED | OUTPATIENT
Start: 2018-01-01 | End: 2018-01-01 | Stop reason: HOSPADM

## 2018-01-01 RX ORDER — ECHINACEA PURPUREA EXTRACT 125 MG
1 TABLET ORAL PRN
Qty: 1 BOTTLE | Refills: 3 | Status: SHIPPED | OUTPATIENT
Start: 2018-01-01

## 2018-01-01 RX ORDER — MORPHINE SULFATE 10 MG/5ML
25 SOLUTION ORAL EVERY 4 HOURS PRN
Status: DISCONTINUED | OUTPATIENT
Start: 2018-01-01 | End: 2018-01-01 | Stop reason: ALTCHOICE

## 2018-01-01 RX ORDER — MORPHINE SULFATE 10 MG/5ML
30 SOLUTION ORAL EVERY 4 HOURS PRN
Status: DISCONTINUED | OUTPATIENT
Start: 2018-01-01 | End: 2018-01-01 | Stop reason: ALTCHOICE

## 2018-01-01 RX ORDER — DEXAMETHASONE 4 MG/1
TABLET ORAL
Qty: 12 TABLET | Refills: 2 | Status: CANCELLED | OUTPATIENT
Start: 2018-01-01

## 2018-01-01 RX ORDER — FUROSEMIDE 10 MG/ML
INJECTION INTRAMUSCULAR; INTRAVENOUS
Status: DISCONTINUED
Start: 2018-01-01 | End: 2018-01-01 | Stop reason: WASHOUT

## 2018-01-01 RX ORDER — PREDNISONE 1 MG/1
5 TABLET ORAL 2 TIMES DAILY
Status: DISCONTINUED | OUTPATIENT
Start: 2018-01-01 | End: 2018-01-01 | Stop reason: HOSPADM

## 2018-01-01 RX ORDER — LEVOTHYROXINE SODIUM 0.12 MG/1
125 TABLET ORAL DAILY
Qty: 30 TABLET | Refills: 0 | Status: SHIPPED | OUTPATIENT
Start: 2018-01-01

## 2018-01-01 RX ORDER — 0.9 % SODIUM CHLORIDE 0.9 %
1000 INTRAVENOUS SOLUTION INTRAVENOUS ONCE
Status: COMPLETED | OUTPATIENT
Start: 2018-01-01 | End: 2018-01-01

## 2018-01-01 RX ORDER — PSEUDOEPHEDRINE HCL 30 MG
100 TABLET ORAL DAILY
COMMUNITY
Start: 2018-01-01

## 2018-01-01 RX ORDER — 0.9 % SODIUM CHLORIDE 0.9 %
30 INTRAVENOUS SOLUTION INTRAVENOUS ONCE
Status: COMPLETED | OUTPATIENT
Start: 2018-01-01 | End: 2018-01-01

## 2018-01-01 RX ORDER — MORPHINE SULFATE 2 MG/ML
INJECTION, SOLUTION INTRAMUSCULAR; INTRAVENOUS
Status: COMPLETED
Start: 2018-01-01 | End: 2018-01-01

## 2018-01-01 RX ORDER — BISACODYL 10 MG
10 SUPPOSITORY, RECTAL RECTAL PRN
Status: DISCONTINUED | OUTPATIENT
Start: 2018-01-01 | End: 2018-01-01 | Stop reason: HOSPADM

## 2018-01-01 RX ORDER — METHADONE HYDROCHLORIDE 5 MG/1
5 TABLET ORAL EVERY 6 HOURS
Qty: 120 TABLET | Refills: 0 | Status: CANCELLED | OUTPATIENT
Start: 2018-01-01 | End: 2018-01-01

## 2018-01-01 RX ORDER — TC 99M MEDRONATE 20 MG/10ML
26.5 INJECTION, POWDER, LYOPHILIZED, FOR SOLUTION INTRAVENOUS
Status: COMPLETED | OUTPATIENT
Start: 2018-01-01 | End: 2018-01-01

## 2018-01-01 RX ORDER — LORAZEPAM 2 MG/ML
1 CONCENTRATE ORAL EVERY 4 HOURS PRN
Status: DISCONTINUED | OUTPATIENT
Start: 2018-01-01 | End: 2018-01-01

## 2018-01-01 RX ORDER — METHADONE HYDROCHLORIDE 10 MG/1
5 TABLET ORAL EVERY 6 HOURS
Status: DISCONTINUED | OUTPATIENT
Start: 2018-01-01 | End: 2018-01-01 | Stop reason: HOSPADM

## 2018-01-01 RX ORDER — 0.9 % SODIUM CHLORIDE 0.9 %
500 INTRAVENOUS SOLUTION INTRAVENOUS ONCE
Status: CANCELLED
Start: 2018-01-01 | End: 2018-01-01

## 2018-01-01 RX ORDER — TRAMADOL HYDROCHLORIDE 50 MG/1
50 TABLET ORAL EVERY 4 HOURS PRN
Qty: 42 TABLET | Refills: 0 | Status: SHIPPED | OUTPATIENT
Start: 2018-01-01 | End: 2018-01-01

## 2018-01-01 RX ORDER — DIPHENHYDRAMINE HYDROCHLORIDE 50 MG/ML
12.5 INJECTION INTRAMUSCULAR; INTRAVENOUS
Status: DISCONTINUED | OUTPATIENT
Start: 2018-01-01 | End: 2018-01-01 | Stop reason: HOSPADM

## 2018-01-01 RX ORDER — ONDANSETRON 2 MG/ML
4 INJECTION INTRAMUSCULAR; INTRAVENOUS EVERY 30 MIN PRN
Status: DISCONTINUED | OUTPATIENT
Start: 2018-01-01 | End: 2018-01-01 | Stop reason: HOSPADM

## 2018-01-01 RX ORDER — SODIUM CHLORIDE 9 MG/ML
INJECTION, SOLUTION INTRAVENOUS CONTINUOUS
Status: DISCONTINUED | OUTPATIENT
Start: 2018-01-01 | End: 2018-01-01 | Stop reason: HOSPADM

## 2018-01-01 RX ORDER — ASPIRIN 325 MG
325 TABLET ORAL
Status: DISCONTINUED | OUTPATIENT
Start: 2018-01-01 | End: 2018-01-01

## 2018-01-01 RX ORDER — ACETAMINOPHEN 160 MG/5ML
10 SUSPENSION, ORAL (FINAL DOSE FORM) ORAL ONCE
Status: DISCONTINUED | OUTPATIENT
Start: 2018-01-01 | End: 2018-01-01

## 2018-01-01 RX ORDER — LORAZEPAM 2 MG/ML
2.5 CONCENTRATE ORAL
Status: DISCONTINUED | OUTPATIENT
Start: 2018-01-01 | End: 2018-01-01 | Stop reason: HOSPADM

## 2018-01-01 RX ORDER — BICALUTAMIDE 50 MG/1
50 TABLET, FILM COATED ORAL DAILY
COMMUNITY
End: 2018-01-01

## 2018-01-01 RX ORDER — MORPHINE SULFATE 2 MG/ML
2 INJECTION, SOLUTION INTRAMUSCULAR; INTRAVENOUS ONCE
Status: COMPLETED | OUTPATIENT
Start: 2018-01-01 | End: 2018-01-01

## 2018-01-01 RX ORDER — TC 99M MEDRONATE 20 MG/10ML
25 INJECTION, POWDER, LYOPHILIZED, FOR SOLUTION INTRAVENOUS
Status: COMPLETED | OUTPATIENT
Start: 2018-01-01 | End: 2018-01-01

## 2018-01-01 RX ADMIN — CEFTRIAXONE SODIUM 1 G: 1 INJECTION, POWDER, FOR SOLUTION INTRAMUSCULAR; INTRAVENOUS at 14:43

## 2018-01-01 RX ADMIN — SODIUM CHLORIDE 250 ML: 9 INJECTION, SOLUTION INTRAVENOUS at 12:14

## 2018-01-01 RX ADMIN — VITAMIN D, TAB 1000IU (100/BT) 1000 UNITS: 25 TAB at 09:16

## 2018-01-01 RX ADMIN — CEFTRIAXONE SODIUM 1 G: 1 INJECTION, POWDER, FOR SOLUTION INTRAMUSCULAR; INTRAVENOUS at 13:57

## 2018-01-01 RX ADMIN — SODIUM CHLORIDE, PRESERVATIVE FREE 500 UNITS: 5 INJECTION INTRAVENOUS at 14:09

## 2018-01-01 RX ADMIN — SULFAMETHOXAZOLE AND TRIMETHOPRIM 1 TABLET: 800; 160 TABLET ORAL at 08:57

## 2018-01-01 RX ADMIN — IOPAMIDOL 80 ML: 755 INJECTION, SOLUTION INTRAVENOUS at 12:11

## 2018-01-01 RX ADMIN — VITAMIN D, TAB 1000IU (100/BT) 1000 UNITS: 25 TAB at 08:57

## 2018-01-01 RX ADMIN — MORPHINE SULFATE 4 MG: 4 INJECTION, SOLUTION INTRAMUSCULAR; INTRAVENOUS at 14:13

## 2018-01-01 RX ADMIN — METHADONE HYDROCHLORIDE 5 MG: 10 TABLET ORAL at 19:56

## 2018-01-01 RX ADMIN — PYRIDOXINE HCL TAB 50 MG 100 MG: 50 TAB at 09:56

## 2018-01-01 RX ADMIN — Medication 500 UNITS: at 09:40

## 2018-01-01 RX ADMIN — PREDNISONE 10 MG: 10 TABLET ORAL at 08:12

## 2018-01-01 RX ADMIN — Medication 100 MG: at 09:54

## 2018-01-01 RX ADMIN — LEVOTHYROXINE SODIUM 125 MCG: 125 TABLET ORAL at 05:18

## 2018-01-01 RX ADMIN — Medication 20 ML: at 08:16

## 2018-01-01 RX ADMIN — POTASSIUM CHLORIDE 20 MEQ: 20 TABLET, EXTENDED RELEASE ORAL at 11:23

## 2018-01-01 RX ADMIN — SODIUM CHLORIDE 100 ML: 9 INJECTION, SOLUTION INTRAVENOUS at 11:08

## 2018-01-01 RX ADMIN — SODIUM CHLORIDE 250 ML: 9 INJECTION, SOLUTION INTRAVENOUS at 13:36

## 2018-01-01 RX ADMIN — SODIUM CHLORIDE, PRESERVATIVE FREE: 5 INJECTION INTRAVENOUS at 17:38

## 2018-01-01 RX ADMIN — CALCIUM CARBONATE-VITAMIN D TAB 500 MG-200 UNIT 1 TABLET: 500-200 TAB at 10:34

## 2018-01-01 RX ADMIN — SODIUM CHLORIDE 1905 ML: 9 INJECTION, SOLUTION INTRAVENOUS at 13:45

## 2018-01-01 RX ADMIN — ASPIRIN 325 MG: 325 TABLET ORAL at 18:22

## 2018-01-01 RX ADMIN — Medication 10 MG: at 12:58

## 2018-01-01 RX ADMIN — PIPERACILLIN SODIUM AND TAZOBACTAM SODIUM 3.38 G: 3; .375 INJECTION, POWDER, LYOPHILIZED, FOR SOLUTION INTRAVENOUS at 21:02

## 2018-01-01 RX ADMIN — VITAMIN D, TAB 1000IU (100/BT) 1000 UNITS: 25 TAB at 08:16

## 2018-01-01 RX ADMIN — Medication 20 ML: at 10:46

## 2018-01-01 RX ADMIN — Medication 20 ML: at 11:01

## 2018-01-01 RX ADMIN — ONDANSETRON 4 MG: 2 INJECTION INTRAMUSCULAR; INTRAVENOUS at 19:45

## 2018-01-01 RX ADMIN — TBO-FILGRASTIM 300 MCG: 300 INJECTION, SOLUTION SUBCUTANEOUS at 23:20

## 2018-01-01 RX ADMIN — Medication 10 ML: at 10:45

## 2018-01-01 RX ADMIN — Medication 10 ML: at 11:41

## 2018-01-01 RX ADMIN — ATENOLOL 25 MG: 25 TABLET ORAL at 09:34

## 2018-01-01 RX ADMIN — ONDANSETRON 8 MG: 2 INJECTION INTRAMUSCULAR; INTRAVENOUS at 14:34

## 2018-01-01 RX ADMIN — Medication 100 MG: at 09:43

## 2018-01-01 RX ADMIN — DEXAMETHASONE SODIUM PHOSPHATE 10 MG: 4 INJECTION, SOLUTION INTRAMUSCULAR; INTRAVENOUS at 13:35

## 2018-01-01 RX ADMIN — Medication 100 MG: at 08:16

## 2018-01-01 RX ADMIN — Medication 10.9 MILLICURIE: at 07:40

## 2018-01-01 RX ADMIN — Medication 10 ML: at 08:20

## 2018-01-01 RX ADMIN — ENOXAPARIN SODIUM 40 MG: 40 INJECTION SUBCUTANEOUS at 09:57

## 2018-01-01 RX ADMIN — SODIUM CHLORIDE: 9 INJECTION, SOLUTION INTRAVENOUS at 17:27

## 2018-01-01 RX ADMIN — SODIUM CHLORIDE 500 ML: 9 INJECTION, SOLUTION INTRAVENOUS at 00:01

## 2018-01-01 RX ADMIN — PRAVASTATIN SODIUM 40 MG: 40 TABLET ORAL at 21:38

## 2018-01-01 RX ADMIN — ONDANSETRON 4 MG: 2 INJECTION INTRAMUSCULAR; INTRAVENOUS at 10:28

## 2018-01-01 RX ADMIN — Medication 20 ML: at 11:44

## 2018-01-01 RX ADMIN — TBO-FILGRASTIM 300 MCG: 300 INJECTION, SOLUTION SUBCUTANEOUS at 21:54

## 2018-01-01 RX ADMIN — SODIUM CHLORIDE 250 ML: 9 INJECTION, SOLUTION INTRAVENOUS at 13:10

## 2018-01-01 RX ADMIN — CEFTRIAXONE SODIUM 1 G: 1 INJECTION, POWDER, FOR SOLUTION INTRAMUSCULAR; INTRAVENOUS at 15:37

## 2018-01-01 RX ADMIN — HEPARIN SODIUM (PORCINE) LOCK FLUSH IV SOLN 100 UNIT/ML 500 UNITS: 100 SOLUTION at 11:30

## 2018-01-01 RX ADMIN — PRAVASTATIN SODIUM 40 MG: 40 TABLET ORAL at 23:03

## 2018-01-01 RX ADMIN — MULTIPLE VITAMINS W/ MINERALS TAB 1 TABLET: TAB at 08:21

## 2018-01-01 RX ADMIN — MULTIPLE VITAMINS W/ MINERALS TAB 1 TABLET: TAB at 09:48

## 2018-01-01 RX ADMIN — SODIUM CHLORIDE 500 ML: 9 INJECTION, SOLUTION INTRAVENOUS at 12:55

## 2018-01-01 RX ADMIN — POTASSIUM PHOSPHATE, MONOBASIC AND POTASSIUM PHOSPHATE, DIBASIC 15.3 MMOL: 224; 236 INJECTION, SOLUTION, CONCENTRATE INTRAVENOUS at 22:17

## 2018-01-01 RX ADMIN — METHADONE HYDROCHLORIDE 5 MG: 10 TABLET ORAL at 09:44

## 2018-01-01 RX ADMIN — Medication 10 ML: at 13:20

## 2018-01-01 RX ADMIN — Medication 10 ML: at 09:23

## 2018-01-01 RX ADMIN — PREDNISONE 5 MG: 5 TABLET ORAL at 10:34

## 2018-01-01 RX ADMIN — Medication 10 ML: at 14:09

## 2018-01-01 RX ADMIN — Medication 500 UNITS: at 14:30

## 2018-01-01 RX ADMIN — Medication 10 ML: at 22:19

## 2018-01-01 RX ADMIN — DENOSUMAB 120 MG: 120 INJECTION SUBCUTANEOUS at 12:07

## 2018-01-01 RX ADMIN — ONDANSETRON 4 MG: 4 TABLET, ORALLY DISINTEGRATING ORAL at 13:16

## 2018-01-01 RX ADMIN — Medication 10 ML: at 11:08

## 2018-01-01 RX ADMIN — SODIUM CHLORIDE: 9 INJECTION, SOLUTION INTRAVENOUS at 14:28

## 2018-01-01 RX ADMIN — SODIUM CHLORIDE: 9 INJECTION, SOLUTION INTRAVENOUS at 05:30

## 2018-01-01 RX ADMIN — LEUPROLIDE ACETATE 7.5 MG: KIT at 14:15

## 2018-01-01 RX ADMIN — Medication 10 ML: at 08:10

## 2018-01-01 RX ADMIN — PROPOFOL 20 MG: 10 INJECTION, EMULSION INTRAVENOUS at 13:15

## 2018-01-01 RX ADMIN — Medication 10 ML: at 09:44

## 2018-01-01 RX ADMIN — Medication 15 MG: at 18:18

## 2018-01-01 RX ADMIN — Medication 500 UNITS: at 15:31

## 2018-01-01 RX ADMIN — PROPOFOL 20 MG: 10 INJECTION, EMULSION INTRAVENOUS at 13:14

## 2018-01-01 RX ADMIN — ONDANSETRON 4 MG: 2 INJECTION INTRAMUSCULAR; INTRAVENOUS at 09:09

## 2018-01-01 RX ADMIN — ATENOLOL 25 MG: 25 TABLET ORAL at 09:16

## 2018-01-01 RX ADMIN — GADOTERIDOL 10 ML: 279.3 INJECTION, SOLUTION INTRAVENOUS at 20:58

## 2018-01-01 RX ADMIN — VITAMIN D, TAB 1000IU (100/BT) 1000 UNITS: 25 TAB at 10:35

## 2018-01-01 RX ADMIN — SODIUM CHLORIDE: 9 INJECTION, SOLUTION INTRAVENOUS at 03:40

## 2018-01-01 RX ADMIN — HEPARIN SODIUM (PORCINE) LOCK FLUSH IV SOLN 100 UNIT/ML 500 UNITS: 100 SOLUTION at 16:07

## 2018-01-01 RX ADMIN — Medication 10 ML: at 15:31

## 2018-01-01 RX ADMIN — PRAVASTATIN SODIUM 40 MG: 40 TABLET ORAL at 20:53

## 2018-01-01 RX ADMIN — Medication 500 UNITS: at 13:18

## 2018-01-01 RX ADMIN — Medication 500 UNITS: at 11:11

## 2018-01-01 RX ADMIN — PREDNISONE 5 MG: 5 TABLET ORAL at 08:11

## 2018-01-01 RX ADMIN — SODIUM CHLORIDE, PRESERVATIVE FREE 500 UNITS: 5 INJECTION INTRAVENOUS at 14:12

## 2018-01-01 RX ADMIN — Medication 100 MG: at 18:44

## 2018-01-01 RX ADMIN — PYRIDOXINE HCL TAB 50 MG 100 MG: 50 TAB at 10:36

## 2018-01-01 RX ADMIN — VANCOMYCIN HYDROCHLORIDE 1000 MG: 1 INJECTION, POWDER, LYOPHILIZED, FOR SOLUTION INTRAVENOUS at 14:48

## 2018-01-01 RX ADMIN — PRAVASTATIN SODIUM 40 MG: 40 TABLET ORAL at 21:02

## 2018-01-01 RX ADMIN — VITAMIN D, TAB 1000IU (100/BT) 1000 UNITS: 25 TAB at 11:12

## 2018-01-01 RX ADMIN — IOHEXOL 50 ML: 240 INJECTION, SOLUTION INTRATHECAL; INTRAVASCULAR; INTRAVENOUS; ORAL at 11:15

## 2018-01-01 RX ADMIN — Medication 26.5 MILLICURIE: at 11:04

## 2018-01-01 RX ADMIN — DOCUSATE SODIUM 100 MG: 100 CAPSULE, LIQUID FILLED ORAL at 14:02

## 2018-01-01 RX ADMIN — Medication 10 ML: at 11:54

## 2018-01-01 RX ADMIN — Medication 10 ML: at 16:07

## 2018-01-01 RX ADMIN — POTASSIUM CHLORIDE 40 MEQ: 750 TABLET, FILM COATED, EXTENDED RELEASE ORAL at 10:34

## 2018-01-01 RX ADMIN — SODIUM CHLORIDE 46.5 MG: 0.9 INJECTION, SOLUTION INTRAVENOUS at 14:14

## 2018-01-01 RX ADMIN — Medication 10 ML: at 19:55

## 2018-01-01 RX ADMIN — Medication 20 ML: at 11:41

## 2018-01-01 RX ADMIN — SODIUM CHLORIDE 500 ML: 9 INJECTION, SOLUTION INTRAVENOUS at 11:00

## 2018-01-01 RX ADMIN — PREDNISONE 5 MG: 5 TABLET ORAL at 09:54

## 2018-01-01 RX ADMIN — Medication 500 UNITS: at 10:28

## 2018-01-01 RX ADMIN — DOCUSATE SODIUM 100 MG: 100 CAPSULE, LIQUID FILLED ORAL at 08:56

## 2018-01-01 RX ADMIN — Medication 20 ML: at 08:51

## 2018-01-01 RX ADMIN — SODIUM CHLORIDE 500 ML: 9 INJECTION, SOLUTION INTRAVENOUS at 10:25

## 2018-01-01 RX ADMIN — MULTIPLE VITAMINS W/ MINERALS TAB 1 TABLET: TAB at 09:42

## 2018-01-01 RX ADMIN — LEUPROLIDE ACETATE 7.5 MG: KIT at 14:41

## 2018-01-01 RX ADMIN — LORAZEPAM 1.5 MG: 2 SOLUTION, CONCENTRATE ORAL at 19:20

## 2018-01-01 RX ADMIN — CEFAZOLIN SODIUM 1 G: 1 INJECTION, POWDER, FOR SOLUTION INTRAMUSCULAR; INTRAVENOUS at 12:47

## 2018-01-01 RX ADMIN — ONDANSETRON HYDROCHLORIDE 4 MG: 4 INJECTION, SOLUTION INTRAMUSCULAR; INTRAVENOUS at 13:00

## 2018-01-01 RX ADMIN — SODIUM CHLORIDE 1000 ML: 9 INJECTION, SOLUTION INTRAVENOUS at 20:09

## 2018-01-01 RX ADMIN — POLYETHYLENE GLYCOL 3350 17 G: 17 POWDER, FOR SOLUTION ORAL at 10:18

## 2018-01-01 RX ADMIN — Medication 10 ML: at 15:57

## 2018-01-01 RX ADMIN — HEPARIN 500 UNITS: 100 SYRINGE at 15:42

## 2018-01-01 RX ADMIN — SODIUM CHLORIDE 500 ML: 9 INJECTION, SOLUTION INTRAVENOUS at 12:50

## 2018-01-01 RX ADMIN — PROPOFOL 20 MG: 10 INJECTION, EMULSION INTRAVENOUS at 13:05

## 2018-01-01 RX ADMIN — PEGFILGRASTIM 6 MG: KIT SUBCUTANEOUS at 15:23

## 2018-01-01 RX ADMIN — Medication 10 ML: at 14:24

## 2018-01-01 RX ADMIN — HEPARIN 300 UNITS: 100 SYRINGE at 03:39

## 2018-01-01 RX ADMIN — Medication 10 ML: at 14:54

## 2018-01-01 RX ADMIN — DENOSUMAB 120 MG: 120 INJECTION SUBCUTANEOUS at 14:12

## 2018-01-01 RX ADMIN — LEVOTHYROXINE SODIUM 125 MCG: 125 TABLET ORAL at 06:06

## 2018-01-01 RX ADMIN — IPRATROPIUM BROMIDE AND ALBUTEROL SULFATE 1 AMPULE: .5; 3 SOLUTION RESPIRATORY (INHALATION) at 19:55

## 2018-01-01 RX ADMIN — LORAZEPAM 0.5 MG: 2 SOLUTION, CONCENTRATE ORAL at 12:57

## 2018-01-01 RX ADMIN — DEXAMETHASONE SODIUM PHOSPHATE 12 MG: 4 INJECTION, SOLUTION INTRAMUSCULAR; INTRAVENOUS at 12:30

## 2018-01-01 RX ADMIN — Medication 10 ML: at 16:48

## 2018-01-01 RX ADMIN — DENOSUMAB 120 MG: 120 INJECTION SUBCUTANEOUS at 17:19

## 2018-01-01 RX ADMIN — Medication 20 MG: at 22:35

## 2018-01-01 RX ADMIN — HEPARIN 500 UNITS: 100 SYRINGE at 17:00

## 2018-01-01 RX ADMIN — Medication 100 MG: at 08:57

## 2018-01-01 RX ADMIN — METHADONE HYDROCHLORIDE 5 MG: 10 TABLET ORAL at 16:01

## 2018-01-01 RX ADMIN — ONDANSETRON 4 MG: 2 INJECTION INTRAMUSCULAR; INTRAVENOUS at 18:35

## 2018-01-01 RX ADMIN — PREDNISONE 5 MG: 5 TABLET ORAL at 21:02

## 2018-01-01 RX ADMIN — SODIUM CHLORIDE 46.5 MG: 0.9 INJECTION, SOLUTION INTRAVENOUS at 15:25

## 2018-01-01 RX ADMIN — Medication 10 ML: at 10:26

## 2018-01-01 RX ADMIN — PIPERACILLIN SODIUM AND TAZOBACTAM SODIUM 3.38 G: 3; .375 INJECTION, POWDER, LYOPHILIZED, FOR SOLUTION INTRAVENOUS at 12:23

## 2018-01-01 RX ADMIN — HEPARIN SODIUM (PORCINE) LOCK FLUSH IV SOLN 100 UNIT/ML 300 UNITS: 100 SOLUTION at 03:39

## 2018-01-01 RX ADMIN — LEVOTHYROXINE SODIUM 125 MCG: 125 TABLET ORAL at 05:36

## 2018-01-01 RX ADMIN — MORPHINE SULFATE 10 MG: 10 SOLUTION ORAL at 09:22

## 2018-01-01 RX ADMIN — METHADONE HYDROCHLORIDE 5 MG: 10 TABLET ORAL at 19:49

## 2018-01-01 RX ADMIN — Medication 10 ML: at 17:00

## 2018-01-01 RX ADMIN — Medication 500 UNITS: at 15:34

## 2018-01-01 RX ADMIN — SODIUM CHLORIDE, PRESERVATIVE FREE 500 UNITS: 5 INJECTION INTRAVENOUS at 14:24

## 2018-01-01 RX ADMIN — LORAZEPAM 2 MG: 2 SOLUTION, CONCENTRATE ORAL at 23:57

## 2018-01-01 RX ADMIN — DOCETAXEL 137 MG: 10 INJECTION, SOLUTION INTRAVENOUS at 13:11

## 2018-01-01 RX ADMIN — SODIUM CHLORIDE: 9 INJECTION, SOLUTION INTRAVENOUS at 17:53

## 2018-01-01 RX ADMIN — SODIUM CHLORIDE: 9 INJECTION, SOLUTION INTRAVENOUS at 13:35

## 2018-01-01 RX ADMIN — Medication 10 ML: at 13:10

## 2018-01-01 RX ADMIN — TC 99M MEDRONATE 26.7 MILLICURIE: 20 INJECTION, POWDER, LYOPHILIZED, FOR SOLUTION INTRAVENOUS at 12:00

## 2018-01-01 RX ADMIN — HEPARIN 500 UNITS: 100 SYRINGE at 17:07

## 2018-01-01 RX ADMIN — Medication 33.5 MILLICURIE: at 08:45

## 2018-01-01 RX ADMIN — Medication 100 MG: at 09:34

## 2018-01-01 RX ADMIN — MORPHINE SULFATE 2 MG: 2 INJECTION, SOLUTION INTRAMUSCULAR; INTRAVENOUS at 09:09

## 2018-01-01 RX ADMIN — SODIUM CHLORIDE: 9 INJECTION, SOLUTION INTRAVENOUS at 12:22

## 2018-01-01 RX ADMIN — Medication 10 ML: at 11:00

## 2018-01-01 RX ADMIN — Medication 10 ML: at 08:54

## 2018-01-01 RX ADMIN — METHADONE HYDROCHLORIDE 5 MG: 10 TABLET ORAL at 18:41

## 2018-01-01 RX ADMIN — Medication 500 UNITS: at 15:42

## 2018-01-01 RX ADMIN — ATENOLOL 25 MG: 25 TABLET ORAL at 09:54

## 2018-01-01 RX ADMIN — POTASSIUM CHLORIDE: 2 INJECTION, SOLUTION, CONCENTRATE INTRAVENOUS at 11:19

## 2018-01-01 RX ADMIN — ATENOLOL 25 MG: 25 TABLET ORAL at 09:56

## 2018-01-01 RX ADMIN — SODIUM CHLORIDE 500 ML: 9 INJECTION, SOLUTION INTRAVENOUS at 13:00

## 2018-01-01 RX ADMIN — LEVOTHYROXINE SODIUM 125 MCG: 125 TABLET ORAL at 05:37

## 2018-01-01 RX ADMIN — MORPHINE SULFATE 4 MG: 4 INJECTION, SOLUTION INTRAMUSCULAR; INTRAVENOUS at 22:45

## 2018-01-01 RX ADMIN — Medication 500 UNITS: at 15:57

## 2018-01-01 RX ADMIN — POTASSIUM CHLORIDE 40 MEQ: 1500 TABLET, EXTENDED RELEASE ORAL at 09:54

## 2018-01-01 RX ADMIN — Medication 500 UNITS: at 11:01

## 2018-01-01 RX ADMIN — VITAMIN D, TAB 1000IU (100/BT) 1000 UNITS: 25 TAB at 09:34

## 2018-01-01 RX ADMIN — Medication 20 ML: at 09:06

## 2018-01-01 RX ADMIN — Medication 10 ML: at 09:49

## 2018-01-01 RX ADMIN — Medication 20 ML: at 09:22

## 2018-01-01 RX ADMIN — DENOSUMAB 120 MG: 120 INJECTION SUBCUTANEOUS at 14:38

## 2018-01-01 RX ADMIN — PREDNISONE 5 MG: 5 TABLET ORAL at 09:57

## 2018-01-01 RX ADMIN — IOHEXOL 50 ML: 240 INJECTION, SOLUTION INTRATHECAL; INTRAVASCULAR; INTRAVENOUS; ORAL at 17:01

## 2018-01-01 RX ADMIN — PIPERACILLIN SODIUM AND TAZOBACTAM SODIUM 3.38 G: 3; .375 INJECTION, POWDER, LYOPHILIZED, FOR SOLUTION INTRAVENOUS at 04:09

## 2018-01-01 RX ADMIN — PREDNISONE 5 MG: 5 TABLET ORAL at 20:20

## 2018-01-01 RX ADMIN — Medication 10 ML: at 14:12

## 2018-01-01 RX ADMIN — Medication 10 ML: at 10:02

## 2018-01-01 RX ADMIN — LEVOTHYROXINE SODIUM 125 MCG: 125 TABLET ORAL at 06:16

## 2018-01-01 RX ADMIN — Medication 20 ML: at 16:49

## 2018-01-01 RX ADMIN — PRAVASTATIN SODIUM 40 MG: 40 TABLET ORAL at 21:12

## 2018-01-01 RX ADMIN — VITAMIN D, TAB 1000IU (100/BT) 1000 UNITS: 25 TAB at 08:12

## 2018-01-01 RX ADMIN — SODIUM CHLORIDE 500 ML: 9 INJECTION, SOLUTION INTRAVENOUS at 13:28

## 2018-01-01 RX ADMIN — MULTIPLE VITAMINS W/ MINERALS TAB 1 TABLET: TAB at 18:44

## 2018-01-01 RX ADMIN — Medication 10 ML: at 17:11

## 2018-01-01 RX ADMIN — SODIUM CHLORIDE 250 ML: 9 INJECTION, SOLUTION INTRAVENOUS at 10:46

## 2018-01-01 RX ADMIN — Medication 10 MG: at 11:52

## 2018-01-01 RX ADMIN — VITAMIN D, TAB 1000IU (100/BT) 1000 UNITS: 25 TAB at 21:54

## 2018-01-01 RX ADMIN — PRAVASTATIN SODIUM 40 MG: 40 TABLET ORAL at 19:56

## 2018-01-01 RX ADMIN — VITAMIN D, TAB 1000IU (100/BT) 1000 UNITS: 25 TAB at 09:54

## 2018-01-01 RX ADMIN — Medication 10 ML: at 14:29

## 2018-01-01 RX ADMIN — PEGFILGRASTIM 6 MG: KIT SUBCUTANEOUS at 15:44

## 2018-01-01 RX ADMIN — Medication 100 MG: at 11:12

## 2018-01-01 RX ADMIN — ENOXAPARIN SODIUM 40 MG: 40 INJECTION SUBCUTANEOUS at 21:54

## 2018-01-01 RX ADMIN — LEVOTHYROXINE SODIUM 125 MCG: 125 TABLET ORAL at 08:57

## 2018-01-01 RX ADMIN — Medication 10 ML: at 13:55

## 2018-01-01 RX ADMIN — SODIUM CHLORIDE: 9 INJECTION, SOLUTION INTRAVENOUS at 11:11

## 2018-01-01 RX ADMIN — Medication 10 ML: at 10:37

## 2018-01-01 RX ADMIN — ONDANSETRON 4 MG: 2 INJECTION INTRAMUSCULAR; INTRAVENOUS at 00:08

## 2018-01-01 RX ADMIN — PREDNISONE 5 MG: 5 TABLET ORAL at 21:54

## 2018-01-01 RX ADMIN — Medication 20 ML: at 13:21

## 2018-01-01 RX ADMIN — LEVOTHYROXINE SODIUM 125 MCG: 125 TABLET ORAL at 04:41

## 2018-01-01 RX ADMIN — Medication 10 ML: at 11:11

## 2018-01-01 RX ADMIN — SODIUM CHLORIDE: 9 INJECTION, SOLUTION INTRAVENOUS at 11:44

## 2018-01-01 RX ADMIN — VITAMIN D, TAB 1000IU (100/BT) 1000 UNITS: 25 TAB at 09:56

## 2018-01-01 RX ADMIN — Medication 10 ML: at 15:01

## 2018-01-01 RX ADMIN — Medication 10 ML: at 08:15

## 2018-01-01 RX ADMIN — Medication 500 UNITS: at 13:55

## 2018-01-01 RX ADMIN — LEVOTHYROXINE SODIUM 125 MCG: 125 TABLET ORAL at 06:50

## 2018-01-01 RX ADMIN — PRAVASTATIN SODIUM 40 MG: 40 TABLET ORAL at 21:15

## 2018-01-01 RX ADMIN — PYRIDOXINE HCL TAB 50 MG 100 MG: 50 TAB at 09:30

## 2018-01-01 RX ADMIN — MORPHINE SULFATE 4 MG: 2 INJECTION, SOLUTION INTRAMUSCULAR; INTRAVENOUS at 00:08

## 2018-01-01 RX ADMIN — Medication 10 ML: at 10:18

## 2018-01-01 RX ADMIN — METHADONE HYDROCHLORIDE 5 MG: 10 TABLET ORAL at 02:29

## 2018-01-01 RX ADMIN — Medication 10 ML: at 09:10

## 2018-01-01 RX ADMIN — PRAVASTATIN SODIUM 40 MG: 40 TABLET ORAL at 19:50

## 2018-01-01 RX ADMIN — FAMOTIDINE 20 MG: 10 INJECTION, SOLUTION INTRAVENOUS at 14:00

## 2018-01-01 RX ADMIN — METHADONE HYDROCHLORIDE 5 MG: 10 TABLET ORAL at 23:36

## 2018-01-01 RX ADMIN — METHADONE HYDROCHLORIDE 5 MG: 10 TABLET ORAL at 05:23

## 2018-01-01 RX ADMIN — DENOSUMAB 120 MG: 120 INJECTION SUBCUTANEOUS at 15:23

## 2018-01-01 RX ADMIN — LORAZEPAM 1 MG: 2 SOLUTION, CONCENTRATE ORAL at 10:17

## 2018-01-01 RX ADMIN — Medication 20 ML: at 08:11

## 2018-01-01 RX ADMIN — Medication 20 ML: at 11:17

## 2018-01-01 RX ADMIN — Medication 500 UNITS: at 16:49

## 2018-01-01 RX ADMIN — CEFTRIAXONE SODIUM 1 G: 1 INJECTION, POWDER, FOR SOLUTION INTRAMUSCULAR; INTRAVENOUS at 15:18

## 2018-01-01 RX ADMIN — POTASSIUM CHLORIDE: 2 INJECTION, SOLUTION, CONCENTRATE INTRAVENOUS at 15:55

## 2018-01-01 RX ADMIN — IOPAMIDOL 80 ML: 755 INJECTION, SOLUTION INTRAVENOUS at 21:45

## 2018-01-01 RX ADMIN — CALCIUM CARBONATE-VITAMIN D TAB 500 MG-200 UNIT 1 TABLET: 500-200 TAB at 21:02

## 2018-01-01 RX ADMIN — Medication 10 ML: at 20:21

## 2018-01-01 RX ADMIN — CEFTRIAXONE SODIUM 1 G: 1 INJECTION, POWDER, FOR SOLUTION INTRAMUSCULAR; INTRAVENOUS at 15:15

## 2018-01-01 RX ADMIN — PRAVASTATIN SODIUM 40 MG: 40 TABLET ORAL at 21:54

## 2018-01-01 RX ADMIN — Medication 20 ML: at 10:15

## 2018-01-01 RX ADMIN — Medication 20 ML: at 11:31

## 2018-01-01 RX ADMIN — Medication 20 ML: at 10:06

## 2018-01-01 RX ADMIN — Medication 10 ML: at 21:02

## 2018-01-01 RX ADMIN — ATENOLOL 25 MG: 25 TABLET ORAL at 12:30

## 2018-01-01 RX ADMIN — CALCIUM CARBONATE-VITAMIN D TAB 500 MG-200 UNIT 1 TABLET: 500-200 TAB at 21:54

## 2018-01-01 RX ADMIN — SODIUM CHLORIDE, PRESERVATIVE FREE 500 UNITS: 5 INJECTION INTRAVENOUS at 12:20

## 2018-01-01 RX ADMIN — Medication 10 ML: at 17:05

## 2018-01-01 RX ADMIN — CALCIUM CARBONATE-VITAMIN D TAB 500 MG-200 UNIT 1 TABLET: 500-200 TAB at 08:11

## 2018-01-01 RX ADMIN — Medication 20 ML: at 10:19

## 2018-01-01 RX ADMIN — LEUPROLIDE ACETATE 7.5 MG: KIT at 15:20

## 2018-01-01 RX ADMIN — Medication 10 ML: at 11:01

## 2018-01-01 RX ADMIN — SODIUM CHLORIDE 250 ML: 9 INJECTION, SOLUTION INTRAVENOUS at 13:57

## 2018-01-01 RX ADMIN — FAMOTIDINE 20 MG: 10 INJECTION, SOLUTION INTRAVENOUS at 14:29

## 2018-01-01 RX ADMIN — VITAMIN D, TAB 1000IU (100/BT) 1000 UNITS: 25 TAB at 09:48

## 2018-01-01 RX ADMIN — DIPHENHYDRAMINE HYDROCHLORIDE 50 MG: 50 INJECTION, SOLUTION INTRAMUSCULAR; INTRAVENOUS at 14:25

## 2018-01-01 RX ADMIN — Medication 10 ML: at 08:50

## 2018-01-01 RX ADMIN — Medication 10 ML: at 19:50

## 2018-01-01 RX ADMIN — PREDNISONE 10 MG: 10 TABLET ORAL at 09:48

## 2018-01-01 RX ADMIN — METHADONE HYDROCHLORIDE 5 MG: 10 TABLET ORAL at 12:31

## 2018-01-01 RX ADMIN — SODIUM CHLORIDE, PRESERVATIVE FREE 500 UNITS: 5 INJECTION INTRAVENOUS at 17:11

## 2018-01-01 RX ADMIN — DEXAMETHASONE SODIUM PHOSPHATE 12 MG: 4 INJECTION, SOLUTION INTRAMUSCULAR; INTRAVENOUS at 11:45

## 2018-01-01 RX ADMIN — MULTIPLE VITAMINS W/ MINERALS TAB 1 TABLET: TAB at 09:54

## 2018-01-01 RX ADMIN — Medication 10 ML: at 08:12

## 2018-01-01 RX ADMIN — LEUPROLIDE ACETATE 7.5 MG: KIT at 17:14

## 2018-01-01 RX ADMIN — Medication 20 ML: at 08:55

## 2018-01-01 RX ADMIN — LEUPROLIDE ACETATE 7.5 MG: KIT at 12:24

## 2018-01-01 RX ADMIN — DEXAMETHASONE SODIUM PHOSPHATE 12 MG: 4 INJECTION, SOLUTION INTRA-ARTICULAR; INTRALESIONAL; INTRAMUSCULAR; INTRAVENOUS; SOFT TISSUE at 12:33

## 2018-01-01 RX ADMIN — ENOXAPARIN SODIUM 40 MG: 40 INJECTION SUBCUTANEOUS at 21:09

## 2018-01-01 RX ADMIN — Medication 10 ML: at 09:40

## 2018-01-01 RX ADMIN — Medication 20 ML: at 10:30

## 2018-01-01 RX ADMIN — Medication 10 ML: at 09:25

## 2018-01-01 RX ADMIN — PIPERACILLIN SODIUM AND TAZOBACTAM SODIUM 3.38 G: 3; .375 INJECTION, POWDER, LYOPHILIZED, FOR SOLUTION INTRAVENOUS at 04:42

## 2018-01-01 RX ADMIN — LEVOTHYROXINE SODIUM 125 MCG: 125 TABLET ORAL at 18:19

## 2018-01-01 RX ADMIN — PRAVASTATIN SODIUM 40 MG: 40 TABLET ORAL at 20:20

## 2018-01-01 RX ADMIN — FAMOTIDINE 20 MG: 10 INJECTION, SOLUTION INTRAVENOUS at 14:54

## 2018-01-01 RX ADMIN — SODIUM CHLORIDE 46.5 MG: 0.9 INJECTION, SOLUTION INTRAVENOUS at 14:33

## 2018-01-01 RX ADMIN — DEXAMETHASONE SODIUM PHOSPHATE 8 MG: 4 INJECTION, SOLUTION INTRAMUSCULAR; INTRAVENOUS at 14:59

## 2018-01-01 RX ADMIN — Medication 10 ML: at 12:20

## 2018-01-01 RX ADMIN — SODIUM CHLORIDE: 9 INJECTION, SOLUTION INTRAVENOUS at 13:25

## 2018-01-01 RX ADMIN — PIPERACILLIN SODIUM AND TAZOBACTAM SODIUM 3.38 G: 3; .375 INJECTION, POWDER, LYOPHILIZED, FOR SOLUTION INTRAVENOUS at 21:54

## 2018-01-01 RX ADMIN — MULTIPLE VITAMINS W/ MINERALS TAB 1 TABLET: TAB at 09:56

## 2018-01-01 RX ADMIN — Medication 10 MG: at 19:37

## 2018-01-01 RX ADMIN — DOCETAXEL 137 MG: 10 INJECTION, SOLUTION INTRAVENOUS at 13:26

## 2018-01-01 RX ADMIN — PYRIDOXINE HCL TAB 50 MG 100 MG: 50 TAB at 18:19

## 2018-01-01 RX ADMIN — IOPAMIDOL 80 ML: 755 INJECTION, SOLUTION INTRAVENOUS at 16:22

## 2018-01-01 RX ADMIN — PREDNISONE 5 MG: 5 TABLET ORAL at 21:15

## 2018-01-01 RX ADMIN — DIPHENHYDRAMINE HYDROCHLORIDE 25 MG: 50 INJECTION, SOLUTION INTRAMUSCULAR; INTRAVENOUS at 14:56

## 2018-01-01 RX ADMIN — Medication 10 ML: at 11:30

## 2018-01-01 RX ADMIN — Medication 20 ML: at 11:55

## 2018-01-01 RX ADMIN — HEPARIN 500 UNITS: 100 SYRINGE at 14:07

## 2018-01-01 RX ADMIN — VITAMIN D, TAB 1000IU (100/BT) 1000 UNITS: 25 TAB at 18:44

## 2018-01-01 RX ADMIN — PIPERACILLIN SODIUM AND TAZOBACTAM SODIUM 3.38 G: 3; .375 INJECTION, POWDER, LYOPHILIZED, FOR SOLUTION INTRAVENOUS at 14:09

## 2018-01-01 RX ADMIN — FENTANYL CITRATE 50 MCG: 50 INJECTION INTRAMUSCULAR; INTRAVENOUS at 12:59

## 2018-01-01 RX ADMIN — ATENOLOL 25 MG: 25 TABLET ORAL at 11:11

## 2018-01-01 RX ADMIN — DOCUSATE SODIUM 100 MG: 100 CAPSULE, LIQUID FILLED ORAL at 11:11

## 2018-01-01 RX ADMIN — GADOTERIDOL 10 ML: 279.3 INJECTION, SOLUTION INTRAVENOUS at 09:16

## 2018-01-01 RX ADMIN — Medication 10 ML: at 09:05

## 2018-01-01 RX ADMIN — Medication 10 ML: at 09:33

## 2018-01-01 RX ADMIN — DOCETAXEL 137 MG: 10 INJECTION, SOLUTION INTRAVENOUS at 12:30

## 2018-01-01 RX ADMIN — PREDNISONE 10 MG: 10 TABLET ORAL at 21:47

## 2018-01-01 RX ADMIN — GADOXETATE DISODIUM 10 ML: 181.43 INJECTION, SOLUTION INTRAVENOUS at 12:54

## 2018-01-01 RX ADMIN — PRAVASTATIN SODIUM 40 MG: 40 TABLET ORAL at 21:46

## 2018-01-01 RX ADMIN — Medication 100 MG: at 09:48

## 2018-01-01 RX ADMIN — LEVOTHYROXINE SODIUM 125 MCG: 125 TABLET ORAL at 18:47

## 2018-01-01 RX ADMIN — MAGNESIUM HYDROXIDE 30 ML: 400 SUSPENSION ORAL at 17:01

## 2018-01-01 RX ADMIN — DIPHENHYDRAMINE HYDROCHLORIDE 50 MG: 50 INJECTION, SOLUTION INTRAMUSCULAR; INTRAVENOUS at 13:57

## 2018-01-01 RX ADMIN — PYRIDOXINE HCL TAB 50 MG 100 MG: 50 TAB at 08:11

## 2018-01-01 RX ADMIN — HEPARIN 500 UNITS: 100 SYRINGE at 15:01

## 2018-01-01 RX ADMIN — BACLOFEN 10 MG: 10 TABLET ORAL at 15:06

## 2018-01-01 RX ADMIN — Medication 10 ML: at 14:30

## 2018-01-01 RX ADMIN — DOCUSATE SODIUM 100 MG: 100 CAPSULE, LIQUID FILLED ORAL at 08:12

## 2018-01-01 RX ADMIN — ENOXAPARIN SODIUM 40 MG: 40 INJECTION SUBCUTANEOUS at 10:00

## 2018-01-01 RX ADMIN — FENTANYL CITRATE 50 MCG: 50 INJECTION INTRAMUSCULAR; INTRAVENOUS at 12:47

## 2018-01-01 RX ADMIN — Medication 20 ML: at 10:27

## 2018-01-01 RX ADMIN — ATENOLOL 25 MG: 25 TABLET ORAL at 08:12

## 2018-01-01 RX ADMIN — SODIUM CHLORIDE 250 ML: 9 INJECTION, SOLUTION INTRAVENOUS at 12:57

## 2018-01-01 RX ADMIN — Medication 100 MG: at 08:12

## 2018-01-01 RX ADMIN — Medication 20 ML: at 08:21

## 2018-01-01 RX ADMIN — ATENOLOL 25 MG: 25 TABLET ORAL at 08:16

## 2018-01-01 RX ADMIN — Medication 10 ML: at 15:03

## 2018-01-01 RX ADMIN — Medication 10 ML: at 13:18

## 2018-01-01 RX ADMIN — Medication 10 ML: at 11:16

## 2018-01-01 RX ADMIN — LEVOTHYROXINE SODIUM 125 MCG: 125 TABLET ORAL at 05:23

## 2018-01-01 RX ADMIN — PRAVASTATIN SODIUM 40 MG: 40 TABLET ORAL at 20:39

## 2018-01-01 RX ADMIN — Medication 10 ML: at 14:00

## 2018-01-01 RX ADMIN — Medication 500 UNITS: at 16:48

## 2018-01-01 RX ADMIN — Medication 100 MG: at 09:16

## 2018-01-01 RX ADMIN — Medication 10 ML: at 11:40

## 2018-01-01 RX ADMIN — VITAMIN D, TAB 1000IU (100/BT) 1000 UNITS: 25 TAB at 09:43

## 2018-01-01 RX ADMIN — SODIUM CHLORIDE 250 ML: 9 INJECTION, SOLUTION INTRAVENOUS at 12:28

## 2018-01-01 RX ADMIN — METHADONE HYDROCHLORIDE 5 MG: 10 TABLET ORAL at 08:28

## 2018-01-01 RX ADMIN — Medication 10 ML: at 12:23

## 2018-01-01 RX ADMIN — IOPAMIDOL 85 ML: 755 INJECTION, SOLUTION INTRAVENOUS at 11:15

## 2018-01-01 RX ADMIN — DEXAMETHASONE SODIUM PHOSPHATE 4 MG: 4 INJECTION, SOLUTION INTRAMUSCULAR; INTRAVENOUS at 13:00

## 2018-01-01 RX ADMIN — DEXAMETHASONE SODIUM PHOSPHATE 10 MG: 4 INJECTION, SOLUTION INTRA-ARTICULAR; INTRALESIONAL; INTRAMUSCULAR; INTRAVENOUS; SOFT TISSUE at 13:25

## 2018-01-01 RX ADMIN — Medication 20 ML: at 09:11

## 2018-01-01 RX ADMIN — PREDNISONE 10 MG: 10 TABLET ORAL at 12:57

## 2018-01-01 RX ADMIN — Medication 10 ML: at 10:05

## 2018-01-01 RX ADMIN — SODIUM CHLORIDE: 9 INJECTION, SOLUTION INTRAVENOUS at 21:32

## 2018-01-01 RX ADMIN — PROPOFOL 20 MG: 10 INJECTION, EMULSION INTRAVENOUS at 13:19

## 2018-01-01 RX ADMIN — DOCUSATE SODIUM 100 MG: 100 CAPSULE, LIQUID FILLED ORAL at 09:48

## 2018-01-01 RX ADMIN — HEPARIN 500 UNITS: 100 SYRINGE at 17:21

## 2018-01-01 RX ADMIN — SODIUM CHLORIDE: 9 INJECTION, SOLUTION INTRAVENOUS at 14:50

## 2018-01-01 RX ADMIN — SODIUM CHLORIDE, PRESERVATIVE FREE 500 UNITS: 5 INJECTION INTRAVENOUS at 09:20

## 2018-01-01 ASSESSMENT — ENCOUNTER SYMPTOMS
ABDOMINAL PAIN: 0
FACIAL SWELLING: 0
BLOOD IN STOOL: 0
BLOOD IN STOOL: 0
CONSTIPATION: 0
WHEEZING: 0
CONSTIPATION: 0
SHORTNESS OF BREATH: 0
ABDOMINAL PAIN: 0
NAUSEA: 1
RECTAL PAIN: 0
SORE THROAT: 0
RECTAL PAIN: 0
RECTAL PAIN: 0
ABDOMINAL PAIN: 0
DIARRHEA: 0
ALLERGIC/IMMUNOLOGIC NEGATIVE: 1
EYE DISCHARGE: 0
TROUBLE SWALLOWING: 0
COLOR CHANGE: 0
VOMITING: 0
TROUBLE SWALLOWING: 0
CHEST TIGHTNESS: 0
SHORTNESS OF BREATH: 0
SORE THROAT: 0
DIARRHEA: 0
SORE THROAT: 0
WHEEZING: 0
ABDOMINAL DISTENTION: 0
VOMITING: 0
NAUSEA: 1
CHEST TIGHTNESS: 0
COLOR CHANGE: 0
BACK PAIN: 0
NAUSEA: 1
BACK PAIN: 1
FACIAL SWELLING: 0
CONSTIPATION: 1
EYE DISCHARGE: 0
DIARRHEA: 0
BACK PAIN: 0
WHEEZING: 0
CHEST TIGHTNESS: 0
NAUSEA: 1
VOMITING: 0
VOMITING: 1
NAUSEA: 0
RECTAL PAIN: 0
EYE PAIN: 0
DIARRHEA: 0
EYE PAIN: 0
COUGH: 0
PHOTOPHOBIA: 0
FACIAL SWELLING: 0
SHORTNESS OF BREATH: 0
CHEST TIGHTNESS: 0
NAUSEA: 0
ABDOMINAL DISTENTION: 0
COLOR CHANGE: 0
SHORTNESS OF BREATH: 0
BACK PAIN: 0
CHEST TIGHTNESS: 0
CHOKING: 0
VOMITING: 0
NAUSEA: 1
ABDOMINAL PAIN: 0
SORE THROAT: 0
WHEEZING: 0
DIARRHEA: 0
VOICE CHANGE: 0
EYE DISCHARGE: 0
SHORTNESS OF BREATH: 0
VOMITING: 0
RHINORRHEA: 0
BACK PAIN: 1
NAUSEA: 1
PHOTOPHOBIA: 0
ABDOMINAL PAIN: 0
EYE REDNESS: 0
EYE DISCHARGE: 0
SORE THROAT: 0
COLOR CHANGE: 0
SORE THROAT: 0
COUGH: 0
CONSTIPATION: 1
FACIAL SWELLING: 0
CHEST TIGHTNESS: 0
WHEEZING: 0
SORE THROAT: 0
BACK PAIN: 1
COLOR CHANGE: 0
CONSTIPATION: 1
SHORTNESS OF BREATH: 0
DIARRHEA: 0
NAUSEA: 0
BACK PAIN: 1
CONSTIPATION: 1
SORE THROAT: 0
SHORTNESS OF BREATH: 0
EYE DISCHARGE: 0
VOMITING: 0
CONSTIPATION: 1
CONSTIPATION: 1
EYE REDNESS: 0
BLOOD IN STOOL: 0
WHEEZING: 0
CHEST TIGHTNESS: 0
CONSTIPATION: 1
TROUBLE SWALLOWING: 0
BACK PAIN: 1
WHEEZING: 0
FACIAL SWELLING: 0
RECTAL PAIN: 0
VOMITING: 0
ABDOMINAL PAIN: 0
BLOOD IN STOOL: 0
CHEST TIGHTNESS: 0
EYE DISCHARGE: 0
SHORTNESS OF BREATH: 0
BACK PAIN: 1
ABDOMINAL PAIN: 0
CHEST TIGHTNESS: 0
CHEST TIGHTNESS: 0
COLOR CHANGE: 0
COUGH: 0
TROUBLE SWALLOWING: 0
SHORTNESS OF BREATH: 0
EYE ITCHING: 0
EYE DISCHARGE: 0
NAUSEA: 1
BACK PAIN: 1
CHEST TIGHTNESS: 0
VOMITING: 0
COUGH: 0
ABDOMINAL PAIN: 1
VOMITING: 1
ABDOMINAL DISTENTION: 0
EYE DISCHARGE: 0
CHEST TIGHTNESS: 0
FACIAL SWELLING: 0
WHEEZING: 0
BLOOD IN STOOL: 0
COLOR CHANGE: 0
NAUSEA: 0
BACK PAIN: 0
CONSTIPATION: 1
ABDOMINAL PAIN: 0
DIARRHEA: 0
DIARRHEA: 0
ABDOMINAL DISTENTION: 0
COUGH: 0
FACIAL SWELLING: 0
DIARRHEA: 0
NAUSEA: 0
VOMITING: 0
CONSTIPATION: 1
VOMITING: 0
SORE THROAT: 0
ABDOMINAL PAIN: 0
ABDOMINAL PAIN: 0
BLOOD IN STOOL: 0
NAUSEA: 0
VOMITING: 0
TROUBLE SWALLOWING: 0
STRIDOR: 0
COUGH: 0
FACIAL SWELLING: 0
FACIAL SWELLING: 0
ABDOMINAL DISTENTION: 0
ABDOMINAL PAIN: 0
RECTAL PAIN: 0
ABDOMINAL PAIN: 1
SHORTNESS OF BREATH: 0
SHORTNESS OF BREATH: 0
EYE DISCHARGE: 0
SHORTNESS OF BREATH: 0
DIARRHEA: 0
TROUBLE SWALLOWING: 0
NAUSEA: 0
CONSTIPATION: 0
BACK PAIN: 0
FACIAL SWELLING: 0
CONSTIPATION: 0
BACK PAIN: 0
SHORTNESS OF BREATH: 0
COLOR CHANGE: 0
WHEEZING: 0
SINUS PRESSURE: 0
VOMITING: 1
VOMITING: 0
ABDOMINAL DISTENTION: 0
NAUSEA: 1
EYE REDNESS: 0
ABDOMINAL PAIN: 0
BACK PAIN: 1
NAUSEA: 0
EYE DISCHARGE: 0
RECTAL PAIN: 0
BACK PAIN: 0
DIARRHEA: 0
WHEEZING: 0
WHEEZING: 0
DIARRHEA: 0
TROUBLE SWALLOWING: 0
VOICE CHANGE: 0
ABDOMINAL DISTENTION: 0
ABDOMINAL PAIN: 1
NAUSEA: 0
FACIAL SWELLING: 0
COUGH: 0
BACK PAIN: 1
BACK PAIN: 0
DIARRHEA: 0
ABDOMINAL PAIN: 0
RECTAL PAIN: 0
VOMITING: 0
VOMITING: 0
NAUSEA: 1
FACIAL SWELLING: 0
ABDOMINAL PAIN: 0
ABDOMINAL PAIN: 0
VOMITING: 0
SORE THROAT: 0
BACK PAIN: 1
BACK PAIN: 0
DIARRHEA: 0
EYE DISCHARGE: 0
VOICE CHANGE: 0
CONSTIPATION: 1
BACK PAIN: 1
WHEEZING: 0
DIARRHEA: 0
SORE THROAT: 0
RHINORRHEA: 0
ABDOMINAL DISTENTION: 0
BLOOD IN STOOL: 0
ABDOMINAL DISTENTION: 0
COLOR CHANGE: 0
ABDOMINAL DISTENTION: 0
DIARRHEA: 0
TROUBLE SWALLOWING: 0
COLOR CHANGE: 0
RHINORRHEA: 0
ABDOMINAL PAIN: 0
SHORTNESS OF BREATH: 0
RHINORRHEA: 0
TROUBLE SWALLOWING: 0
FACIAL SWELLING: 0
SHORTNESS OF BREATH: 0
EYE DISCHARGE: 0
NAUSEA: 0
FACIAL SWELLING: 0
SHORTNESS OF BREATH: 0
COUGH: 0
VOMITING: 0
NAUSEA: 1
TROUBLE SWALLOWING: 0
SHORTNESS OF BREATH: 0
NAUSEA: 0
RHINORRHEA: 0
VOMITING: 0
FACIAL SWELLING: 0
ABDOMINAL DISTENTION: 0
EYES NEGATIVE: 1
SHORTNESS OF BREATH: 0
FACIAL SWELLING: 0
EYE REDNESS: 0
BACK PAIN: 1
ABDOMINAL PAIN: 0
BLOOD IN STOOL: 0
ABDOMINAL DISTENTION: 0
VOMITING: 0
SORE THROAT: 0
DIARRHEA: 0
SHORTNESS OF BREATH: 0
WHEEZING: 0
EYE DISCHARGE: 0
EYE PAIN: 0
EYE DISCHARGE: 0
WHEEZING: 0
TROUBLE SWALLOWING: 0
CONSTIPATION: 1
BLOOD IN STOOL: 0
EYE REDNESS: 0
COUGH: 0
FACIAL SWELLING: 0
ABDOMINAL PAIN: 0
WHEEZING: 0
CHEST TIGHTNESS: 0
FACIAL SWELLING: 0
EYE REDNESS: 0
ABDOMINAL DISTENTION: 0
COLOR CHANGE: 0
NAUSEA: 0
CONSTIPATION: 0
CONSTIPATION: 1
CHEST TIGHTNESS: 0
NAUSEA: 1
BLOOD IN STOOL: 0
ABDOMINAL DISTENTION: 0
CONSTIPATION: 0
VOMITING: 0
COUGH: 0
PHOTOPHOBIA: 0
SHORTNESS OF BREATH: 0
CONSTIPATION: 1
VOMITING: 0
CHEST TIGHTNESS: 0
ABDOMINAL DISTENTION: 0
CHEST TIGHTNESS: 0
ABDOMINAL PAIN: 0
DIARRHEA: 0
COUGH: 0
COLOR CHANGE: 0
WHEEZING: 0
COUGH: 0
SHORTNESS OF BREATH: 0
SORE THROAT: 0
EYES NEGATIVE: 1
BACK PAIN: 1
VOMITING: 1
CHEST TIGHTNESS: 0
DIARRHEA: 0
ABDOMINAL PAIN: 0
ABDOMINAL DISTENTION: 0
TROUBLE SWALLOWING: 0
COUGH: 0
WHEEZING: 0
COLOR CHANGE: 0
RECTAL PAIN: 0
TROUBLE SWALLOWING: 0
EYES NEGATIVE: 1
DIARRHEA: 0
SORE THROAT: 0
TROUBLE SWALLOWING: 0
BLOOD IN STOOL: 0
VOMITING: 0
BLOOD IN STOOL: 0
COUGH: 0
BACK PAIN: 1
TROUBLE SWALLOWING: 0
ABDOMINAL PAIN: 0
ABDOMINAL PAIN: 0
SHORTNESS OF BREATH: 0
BACK PAIN: 1
BLOOD IN STOOL: 0
COLOR CHANGE: 0
WHEEZING: 0
SORE THROAT: 0
SORE THROAT: 0
SINUS PAIN: 0
CHEST TIGHTNESS: 0
SORE THROAT: 0
VOMITING: 0
NAUSEA: 1
SORE THROAT: 0
SHORTNESS OF BREATH: 0
WHEEZING: 0
SHORTNESS OF BREATH: 0
RECTAL PAIN: 0
SORE THROAT: 0
RECTAL PAIN: 0
COUGH: 0
RECTAL PAIN: 0
APNEA: 0
ABDOMINAL PAIN: 0
SINUS PRESSURE: 0
VOMITING: 0
COLOR CHANGE: 0
WHEEZING: 0
COUGH: 0
ABDOMINAL PAIN: 0
RECTAL PAIN: 0
VOMITING: 0
NAUSEA: 0
RHINORRHEA: 0
COUGH: 0
SHORTNESS OF BREATH: 0
ABDOMINAL PAIN: 0
CONSTIPATION: 1
NAUSEA: 1
SORE THROAT: 0
BLOOD IN STOOL: 0
BLOOD IN STOOL: 0
EYE DISCHARGE: 0
ABDOMINAL DISTENTION: 0
BACK PAIN: 1
WHEEZING: 0
VOMITING: 0
BLOOD IN STOOL: 0
SORE THROAT: 0
VOMITING: 0
SHORTNESS OF BREATH: 0
TROUBLE SWALLOWING: 1
EYE DISCHARGE: 0
WHEEZING: 0
EYE DISCHARGE: 0
NAUSEA: 1
SINUS PRESSURE: 0
NAUSEA: 0
COUGH: 0
FACIAL SWELLING: 0
SORE THROAT: 0
COUGH: 0
SORE THROAT: 0
COUGH: 0
EYE DISCHARGE: 0
TROUBLE SWALLOWING: 0
BLOOD IN STOOL: 0
WHEEZING: 0
CHEST TIGHTNESS: 0
WHEEZING: 1
SORE THROAT: 0
DIARRHEA: 0
ABDOMINAL DISTENTION: 1
EYE DISCHARGE: 0
COUGH: 0
COUGH: 0
ABDOMINAL PAIN: 0
NAUSEA: 0
CHEST TIGHTNESS: 0
CHEST TIGHTNESS: 0
RECTAL PAIN: 0
SORE THROAT: 0
SHORTNESS OF BREATH: 0
NAUSEA: 0
ABDOMINAL PAIN: 0
SHORTNESS OF BREATH: 0
BLOOD IN STOOL: 0
RESPIRATORY NEGATIVE: 1
BLOOD IN STOOL: 0
COLOR CHANGE: 0
ABDOMINAL DISTENTION: 0
BACK PAIN: 1
BLOOD IN STOOL: 0
CHEST TIGHTNESS: 0
CHEST TIGHTNESS: 0
SHORTNESS OF BREATH: 1
SHORTNESS OF BREATH: 0
BLOOD IN STOOL: 0
COLOR CHANGE: 0
NAUSEA: 0
EYE REDNESS: 0
DIARRHEA: 0
WHEEZING: 0
DIARRHEA: 0
EYE DISCHARGE: 0
VOMITING: 0
SHORTNESS OF BREATH: 0
EYE PAIN: 0
RECTAL PAIN: 0
COUGH: 0
COLOR CHANGE: 0
DIARRHEA: 0
BLOOD IN STOOL: 0
RHINORRHEA: 0
ABDOMINAL PAIN: 0
CONSTIPATION: 0
RHINORRHEA: 0
BACK PAIN: 1
DIARRHEA: 0
SHORTNESS OF BREATH: 0
CONSTIPATION: 0
BACK PAIN: 0
COUGH: 0
ABDOMINAL PAIN: 0
COUGH: 0
SORE THROAT: 0
NAUSEA: 1
ABDOMINAL PAIN: 0
RHINORRHEA: 0
EYE REDNESS: 0
CHEST TIGHTNESS: 0
BACK PAIN: 0
BACK PAIN: 1
SHORTNESS OF BREATH: 1
RECTAL PAIN: 0
COLOR CHANGE: 0
RHINORRHEA: 0
EYES NEGATIVE: 1
CONSTIPATION: 1
CHEST TIGHTNESS: 0
WHEEZING: 0
EYE REDNESS: 0
CHEST TIGHTNESS: 0
WHEEZING: 0
EYE DISCHARGE: 0
CHEST TIGHTNESS: 0
COUGH: 0
COLOR CHANGE: 0
NAUSEA: 1
BACK PAIN: 0
SORE THROAT: 0
WHEEZING: 0
EYE DISCHARGE: 0
CHEST TIGHTNESS: 0
VOMITING: 0
SHORTNESS OF BREATH: 0
RHINORRHEA: 0
COUGH: 0
EYE REDNESS: 0
VOMITING: 0
COUGH: 0
BACK PAIN: 1
DIARRHEA: 0

## 2018-01-01 ASSESSMENT — PULMONARY FUNCTION TESTS
PIF_VALUE: 1

## 2018-01-01 ASSESSMENT — PAIN SCALES - GENERAL
PAINLEVEL_OUTOF10: 0
PAINLEVEL_OUTOF10: 10
PAINLEVEL_OUTOF10: 0
PAINLEVEL_OUTOF10: 9
PAINLEVEL_OUTOF10: 6
PAINLEVEL_OUTOF10: 7
PAINLEVEL_OUTOF10: 0
PAINLEVEL_OUTOF10: 3
PAINLEVEL_OUTOF10: 9
PAINLEVEL_OUTOF10: 0
PAINLEVEL_OUTOF10: 8
PAINLEVEL_OUTOF10: 0
PAINLEVEL_OUTOF10: 2
PAINLEVEL_OUTOF10: 0
PAINLEVEL_OUTOF10: 0
PAINLEVEL_OUTOF10: 5
PAINLEVEL_OUTOF10: 8
PAINLEVEL_OUTOF10: 0
PAINLEVEL_OUTOF10: 6
PAINLEVEL_OUTOF10: 2
PAINLEVEL_OUTOF10: 0
PAINLEVEL_OUTOF10: 2
PAINLEVEL_OUTOF10: 5
PAINLEVEL_OUTOF10: 8
PAINLEVEL_OUTOF10: 0
PAINLEVEL_OUTOF10: 0
PAINLEVEL_OUTOF10: 3
PAINLEVEL_OUTOF10: 0
PAINLEVEL_OUTOF10: 0
PAINLEVEL_OUTOF10: 10
PAINLEVEL_OUTOF10: 0
PAINLEVEL_OUTOF10: 0
PAINLEVEL_OUTOF10: 10
PAINLEVEL_OUTOF10: 0
PAINLEVEL_OUTOF10: 10
PAINLEVEL_OUTOF10: 0
PAINLEVEL_OUTOF10: 10

## 2018-01-01 ASSESSMENT — PAIN DESCRIPTION - DESCRIPTORS
DESCRIPTORS: PATIENT UNABLE TO DESCRIBE
DESCRIPTORS: ACHING
DESCRIPTORS: PATIENT UNABLE TO DESCRIBE
DESCRIPTORS: ACHING
DESCRIPTORS: SHARP

## 2018-01-01 ASSESSMENT — PAIN DESCRIPTION - LOCATION
LOCATION: FOOT
LOCATION: LEG
LOCATION: ABDOMEN
LOCATION: ABDOMEN
LOCATION: BUTTOCKS
LOCATION: GENERALIZED
LOCATION: ABDOMEN
LOCATION: SHOULDER
LOCATION: ABDOMEN

## 2018-01-01 ASSESSMENT — PAIN DESCRIPTION - PROGRESSION
CLINICAL_PROGRESSION: NOT CHANGED
CLINICAL_PROGRESSION: GRADUALLY WORSENING
CLINICAL_PROGRESSION: NOT CHANGED

## 2018-01-01 ASSESSMENT — PAIN DESCRIPTION - ORIENTATION
ORIENTATION: MID
ORIENTATION: RIGHT
ORIENTATION: LEFT
ORIENTATION: LEFT

## 2018-01-01 ASSESSMENT — PAIN DESCRIPTION - FREQUENCY
FREQUENCY: INTERMITTENT
FREQUENCY: CONTINUOUS
FREQUENCY: INTERMITTENT
FREQUENCY: CONTINUOUS
FREQUENCY: INTERMITTENT

## 2018-01-01 ASSESSMENT — PAIN DESCRIPTION - PAIN TYPE
TYPE: CHRONIC PAIN
TYPE: ACUTE PAIN
TYPE: CHRONIC PAIN
TYPE: ACUTE PAIN

## 2018-01-01 ASSESSMENT — PAIN DESCRIPTION - ONSET
ONSET: ON-GOING
ONSET: ON-GOING

## 2018-01-08 NOTE — PROGRESS NOTES
[address] [facility] PROFESSIONAL SERVS  [facility]  [address] [facility] [address]  [address] [facility] [address]  [address] [address]  Dept: [phone]  Dept Fax: [phone]  Loc: [phone]     Visit Date: 1/8/2018     Bhaskar Daniel Sr. is a 78 y.o. male who presents today for:   Chief Complaint   Patient presents with    New Patient     METS PROSTATE CA        HPI:  This is a 79 yo AAM diagnosed with prostate cancer in 2011. Prostate cancer s/p RALRP in June 2011 that showed 2 cm moderately differentiated adenocarcinoma, Donis score 7 (3+4), all margins were negative, there was no involvement of seminal vesicles, there was evidence of perineural involvement. His surgery was complicated by incontinence and erectile dysfunction. His 5 months PSA was up from 0.07 after surgery to 0.23. Per Dr. Renata Estrada note the patient did receive delayed RTC sometime in 2014, however there is no Rad Onc note in Epic to confirm that. We will try to obtain records from radiation  Therapy. His PSA was slowly but steadily rising from 2011. In 11/2015 it was 4.98, then in February 2016 it decrease to 0.84. Sometime in 2014/2015 he was started on Firmagon injections due to rising PSA, then the patient was lost to follow up between February 2016 and November 2017 when his PSA was 646.7. On November 10, 2017 the patient presented to ED with constipation and weight loss of a 2 month. Imaging studies showed :  diffuse osseous abnormalities. There is extensive heterogeneous appearance of the osseous framework throughout all structures. This is a significant interval change since the prior exam, and is consistent with diffuse osseous metastatic lesions    until proven otherwise. No evidence of fracture.       Soft tissues are nonspecific. No convincing pathologically enlarged femoral lymph nodes though the femoral lymph nodes are slightly prominent, short axis diameters of approximately 8-9 mm.      Bone scan showed :  Extensive increased activity throughout the axial and appendicular skeleton. There is irregular ill-defined increased activity in the skull, right frontal region.       Mottled increased activity on most of the ribs. Similar finding can be seen with the vertebral bodies particularly from the anterior posterior projection this is well seen in the lower lumbar bodies. Increased activity in the proximal femora bilaterally    in addition to more focal areas of increased activity in the femoral diaphyses findings are consistent with widespread prostatic disease which can be seen on the patient's recent CT scan abdomen pelvis. While in the hospital the patient received Jay Cozad and was started on Casodex. In November 2017 he underwent cystoscopy, no bladder abnormality was found. Sometime in December 2017, January 2018, he received Xgeva injection. S/p thyroidectomy for Graves disease.    HPI   Past Medical History:   Diagnosis Date    Backache     Cancer [facility]) 2011    prostate    Goiter     Hyperlipidemia     Hypertension     Hypothyroidism     Incontinence of urine 2/28/2012    Osteoarthritis       Past Surgical History:   Procedure Laterality Date    COLONOSCOPY      DE COLSC FLX W/RMVL OF TUMOR POLYP LESION SNARE TQ N/A 11/14/2017    COLONOSCOPY POLYPECTOMY SNARE/COLD BIOPSY performed by Nohemi Morel MD at [facility]    DE ESOPHAGOGASTRODUODENOSCOPY TRANSORAL DIAGNOSTIC N/A 11/14/2017    EGD ESOPHAGOGASTRODUODENOSCOPY performed by Nohemi Morel MD at [address] [place] [address]  2011    Dr. Jacki Cintron      Family History   Problem Relation Age of Onset    High Blood Pressure Mother     Diabetes Mother     Cancer Father      throat      Social History   Substance Use Topics    Smoking status: Former Smoker     Types: Cigarettes, Pipe, Cigars     Quit date: 10/4/1981    Smokeless tobacco: Never Used      Comment: social smoker    Alcohol use Yes      Comment: ocassionally      Current intolerance, polydipsia and polyuria. Genitourinary: Positive for difficulty urinating (incontinence). Negative for decreased urine volume, dysuria, flank pain, hematuria and urgency. Musculoskeletal: Positive for back pain. Negative for arthralgias, gait problem, joint swelling, myalgias and neck stiffness. Skin: Negative for color change, rash and wound. Neurological: Positive for weakness. Negative for dizziness, tremors, seizures, speech difficulty, light-headedness, numbness and headaches. Hematological: Negative for adenopathy. Does not bruise/bleed easily. Psychiatric/Behavioral: Negative for confusion and sleep disturbance. The patient is not nervous/anxious. Objective:   Physical Exam   Constitutional: He is oriented to person, place, and time. He appears well-developed and well-nourished. No distress. HENT:   Head: Normocephalic. Mouth/Throat: Oropharynx is clear and moist. No oropharyngeal exudate. Eyes: EOM are normal. Pupils are equal, round, and reactive to light. Right eye exhibits no discharge. Left eye exhibits no discharge. No scleral icterus. Neck: Normal range of motion. Neck supple. No JVD present. No tracheal deviation present. No thyromegaly present. Cardiovascular: Normal rate and normal heart sounds. Exam reveals no gallop and no friction rub. No murmur heard. Pulmonary/Chest: Effort normal and breath sounds normal. No stridor. No respiratory distress. He has no wheezes. He has no rales. He exhibits no tenderness. Abdominal: Soft. Bowel sounds are normal. He exhibits no distension and no mass. There is no tenderness. There is no rebound. Musculoskeletal: Normal range of motion. He exhibits no edema. Good range of motion in all four extremities. Lymphadenopathy:     He has no cervical adenopathy. Neurological: He is alert and oriented to person, place, and time. He has normal reflexes. No cranial nerve deficit. He exhibits normal muscle tone. the past. In addition his social situation and livening condition are worrisome for   Safe administration of therapy that is associated with significant side effects. At this moment the patient should continue current treatment. He needs referral to dentist to be able to continue Ramonita Malling safely, without the risk of jaw osteonecrosis. Return in about 11 days (around 1/19/2018). Orders Placed:   Orders Placed This Encounter   Procedures    POC PANEL BMP W/IOCA     Standing Status:   Future     Standing Expiration Date:   1/8/2019    CBC Auto Differential     Standing Status:   Future     Standing Expiration Date:   1/8/2019    Hepatic Function Panel     Standing Status:   Future     Standing Expiration Date:   1/8/2019        Medications Prescribed:   No orders of the defined types were placed in this encounter. I spent 60 minutes face-to-face with the patient. Greater than 50% of time was spent on counseling and coordinating his care. Face to face counseling included prognosis, risks, benefits of treatment, compliance and risk reduction.        Electronically signed by Andres Angela MD on 1/8/18 at 3:50 PM

## 2018-01-08 NOTE — PROGRESS NOTES
Name: Greg Vargas  : 1950  MRN: [id number]    Oncology Navigation- Initial Note:    Intake-  Contact Type: Medical Oncology    Diagnosis: Prostate - metastatic to bone & lymph nodes  PSA on 2018: 71.18  PSA on 2017:  583.00  Berenda Ayush last dose on 2017  Xgeva on 2017  ADTstarted 2015  Casodex prescribed by [facility], Dr. Seda Rooney, takes 50 mg (1) tab daily  Prostatectomy in 2011; Resistant, requiring XRT -XRT to Prostate Fossa- completed 2014    Next Urology appt on 2018    Home Disposition: Lives alone on [address], [place]  with his \"dog. \"    Has CELL PHONE: [phone], but does not keep the phone on all the time, making it challenging to reach him. .     Patient needs and barriers to care: Coordination of Care, Knowledge Deficit, [address] [place] [address], Transportation and Housing/ Living Alone   -Pt repeatedly stated he \"do everything for myself\". Pt has family, but shares he does NOT want anyone to know about \"my business, except if I am going to die or something, then I will probably want to tell them \".  -Pt does not appear to understand the status of his disease, as evidenced by his saying, \"just tell me like it is\", but is unable to reiterate back what Dr. Serge Rivas shared with him during the consult. -Pt walks everywhere; does not own a car. Discussed transportation needs for appts.  -Pt shares his home that he rents, Pt reports he has \"bug bites on his skin\", on the torso, but \"can't do anything about it. \"   Karthikeyan suggested pt request the landlord to spray the home for the bug infestation. Pt declines to make the request for fear the landlord would evict him. Referral Source: Outpatient    Interventions-   General Interventions: Karthikeyan folder reviewed, including Karthikeyan contact information. Education/Screenings: Reinforced info the Onc shared with pt, that the prostate cancer has spread to his bone & his lymph nodes.       Referrals: Social

## 2018-01-09 NOTE — PROGRESS NOTES
Onc Navigation:    Karthikeyan phoned [facility] [place]: [phone], spoke with Radha Michelle with inquiry about Dental Services. NO dental services are offered through [address] [place] [address], but are available through the [facility]. Karthikeyan directed to call: [phone] ext [phone] to coordinate dental services. Karthikeyan phoned [facility];   message left with request for return phone call. Karthikeyan met with JOE & updated on need to be involved with pt care per request of Med-Onc. JOE verbalized she will make contact with pt, with whom she is familiar with from when he received radiation therapy.

## 2018-01-12 NOTE — TELEPHONE ENCOUNTER
Onc Navigation:    Phoned Urology office, left VM re: need for communication update with Mary Pace. Received return phone call from Mary Pace at Urology Office. Karthikeyan updated her on the conversation with [facility] Naren CASE yesterday & the request for the referral to the Dental Clinic to address pt dentition since he is receiving Xgeva. Sabrina Galloway was started on 12-. Karthikeyan will query Dr. Keith Curling on timing for the dental work, & update Mary Pace on her recommendation. Urology will cont to manage pt POC for prostate cancer tx & will plan to refer pt to Oncology as needed.

## 2018-01-15 NOTE — TELEPHONE ENCOUNTER
Please see Marc Alegria telephone encounter from 1/12/18. Patient was referred to medical oncology per the [facility] so that he had an established oncologist if needed. Urology will continue to follow the patient for the Prostate Cancer diagnosis and if chemotherapy is needed then medical oncology will be consulted. The concern is about the patient's dentition and taking Xgeva. [facility] and they are going to try to have the teeth removed and dentures placed at that time. Opal Peeks can cause jaw osteonecrosis. The patient is being referred for dental care, but while on Xgeva he will not be able to do it. Marc Alegria will consult Dr Ayse Daniels regarding this.

## 2018-01-15 NOTE — TELEPHONE ENCOUNTER
I spoke with Alexa Shipman today regarding this. The patient has poor dentition and is probably going to need full extraction. He is being referred through the [facility] for dental care. Dr Mary Warren has stated that the timing for extraction has to be no sooner than after 6 weeks from the date of the Xgeva injection. Due to the 12/19/17 Xgeva injection the earliest the dental work could be done is 1/30/18. There can be no Xgeva on 1/24/18 if the dental work can be scheduled that soon. Alexa Shipman gave me the , Jenni Godwin, at the [facility]. The phone number is [phone]. We will need to find out when they can get him in for the dental work. Once the extraction is done the Xgeva cannot be restarted until he is cleared from the dental office and his gumline can be completely healed. We will contact the [facility] to discuss this with them. They are closed today due to the holiday.

## 2018-01-16 NOTE — TELEPHONE ENCOUNTER
Yes if pt is having tooth extraction performed 1/30/18 we cannot give him Xgeva and we will need to wait for the area to fully heal before he receives another dose. Please facilitate coordination with the [place].

## 2018-01-22 NOTE — TELEPHONE ENCOUNTER
Noemy I spoke with Burnie Halsted about this patient needing dental care. She was wanting you to be aware of the patient needing this and that it may be awhile before the he can be seen. We will need to go through his PCP to see if he would be able to be seen in [facility]. Maria Del Carmen said she believes he will have to be seen in [address] [facility].  Could you please call and explain this to the pt?

## 2018-01-24 NOTE — TELEPHONE ENCOUNTER
Patient is scheduled for January 29, 2018 @11:30 to go to [facility]. He has voiced understanding and he will be taking the shuttle [place]. He is also scheduled for a meeting with Tiara castro for him to get set up for Ensure for his weight loss.

## 2018-01-25 NOTE — PROGRESS NOTES
Following Onel Paris NP plan of care. FIRMAGON 80 MG GIVEN S.C Left ABDOMEN. Lot Number: [id number]   Expiration Date: 01/2020  [facility] #: [id number]    After Injection was given there were no reactions at injection site and patient was feeling well. Patient was notified that possible side effects from injections include: Redness, swelling and itching at the injection site. Possible side effects of androgen deprivation therapy, including hot flashes, flushing of the skin, increased weight, decreased sex drive, and difficulties with ED. Patient was instructed to call the office with any further questions or concerns.      Patient supplied their own medications No    Pt [address] therapy first initiated on 11/23/2015

## 2018-01-25 NOTE — PROGRESS NOTES
I have personally verified, reviewed, released, signed, authenticated, authorized, confirmed,finalized, and approved the actions of the CMA. Pt left before I could speak to him. Continue monthly Firmagon injections and casodex.

## 2018-01-26 NOTE — TELEPHONE ENCOUNTER
Navigation:    Karthikeyan received return phone call from [address] Ashley Magallon reports the appt on 1-29-18 is for dental evaluation, & IF there is any time available for any necessary exttraction, they will do them. Karthikeyan updated Kya Magallon on pt's XGEVA dose being given on 12-19-17, for the appropriate timing of extraction. Karthikeyan also requested dental clearance AFTER extraction to ensure COMPLETE gum healing BEFORE Maxine Lints is resumed. Kya Magallon verbalized understanding of info shared & will communicate to the dentist.    Karthikeyan also inquired about fitting pt for dentures, IF pt requires full mouth extraction. Kya Magallon informs pt is % Service Connected with the [place], therefore he does NOT qualify for full dental benefits, but bc he requires medications for his prostate cancer, that place him at risk for osteonecrosis of the jaw, they made the exception to evaluate him & extract teeth that need extracted. Karthikeyan requested that someone from the dental clinic update Navigator after the appt on 1-29-18. Kya Magallon voiced understanding. Karthikeyan phoned [address]:  [phone], spoke with Huber Diaz. Karthikeyan inquired about fitting pt's with dentures. Huber Diaz informs that they can, however, pts are responsible for all of the lab fees, which can be from $1,000-$2,000. Karthikeyan phoned Kelly Hargrove, [address] [place] [address]  Coordinator: [phone] ext [phone]. VM message left with inquiry if she has any suggestions to help [place] obtain dentures IF full mouth extraction is needed.

## 2018-02-01 NOTE — PROGRESS NOTES
Navigation:     Karthikeyan phoned [facility] for input from pt's , Tiffani Warner, re: pt's forgetfulness, as evidenced by pt frequent \"walk in\" visits seeking out Navigator, requiring repeat info/education. Karthikeyan query if this is normal behavior for this patient. Karthikeyan questions if an MRI Brain would be indicated to eval for brain mets.  Jul, will discuss with pt's PCP, Dr. Phil Plaza. Karthikeyan does not note any other deficits in conversation with pt, & this was communicated to the [facility]. Jul will update Navigator after speaking with Dr. Phil Plaza.

## 2018-02-07 NOTE — TELEPHONE ENCOUNTER
Spoke with patient and he will  the PSA order today. He will have this done one week prior to his appointment on 2/27/18. He said he tried seeing the PCP at the [facility] this morning and they were difficult to deal with and he \"doesn't have the time to deal with the hassle. \"

## 2018-02-12 NOTE — TELEPHONE ENCOUNTER
Navigation:     Karthikeyan received phone call from Ramin Wyatt CM at [facility]. She updates that pt will have a CT Head on 2-15 at 0900 (changed from 2-13 appt), with Dental Clinic appt to follow on 2-15-18 at [facility]. [employer] shuttle  is coordinated for  at 0600. Karthikeyan informs [facility] will be coordinated to transport pt to the shuttle  location across from the [facility] on 2-15-18.

## 2018-02-12 NOTE — PROGRESS NOTES
Visit Date: 2/12/2018      HPI:     Ben Cordova. is a 78 y.o. male who presents today for:  Chief Complaint   Patient presents with    Follow-up     CKD II       Current Outpatient Prescriptions   Medication Sig Dispense Refill    calcium carbonate-vitamin D (CALCIUM 600 + D) 600-400 MG-UNIT TABS per tab Take 1 tablet by mouth 2 times daily 180 tablet 3    bicalutamide (CASODEX) 50 MG chemo tablet Take 1 tablet by mouth daily 90 tablet 3    acetaminophen (TYLENOL) 325 MG tablet Take 2 tablets by mouth every 4 hours as needed for Pain 120 tablet 3    Multiple Vitamins-Minerals (MENS MULTI VITAMIN & MINERAL PO) Take by mouth      levothyroxine (SYNTHROID) 100 MCG tablet Take 150 mcg by mouth Daily       aspirin 325 MG tablet Take 325 mg by mouth every 48 hours       pravastatin (PRAVACHOL) 40 MG tablet Take 40 mg by mouth daily. No current facility-administered medications for this visit.       No Known Allergies    Past Medical History:   Diagnosis Date    Backache     Cancer [facility]) 2011    prostate    Goiter     Hyperlipidemia     Hypertension     Hypothyroidism     Incontinence of urine 2/28/2012    Osteoarthritis       Past Surgical History:   Procedure Laterality Date    COLONOSCOPY      [place] COLSC FLX W/RMVL OF TUMOR POLYP LESION SNARE TQ N/A 11/14/2017    COLONOSCOPY POLYPECTOMY SNARE/COLD BIOPSY performed by Jim Solares MD at [facility]    MN ESOPHAGOGASTRODUODENOSCOPY TRANSORAL DIAGNOSTIC N/A 11/14/2017    EGD ESOPHAGOGASTRODUODENOSCOPY performed by Jim Solares MD at [address] [place] [address]  2011    Dr. Cassius Singh     Family History   Problem Relation Age of Onset    High Blood Pressure Mother     Diabetes Mother     Cancer Father      throat     Social History   Substance Use Topics    Smoking status: Former Smoker     Types: Cigarettes, Pipe, Cigars     Quit date: 10/4/1981    Smokeless tobacco: Never Used      Comment: social smoker    Alcohol use Yes      Comment: ocassionally        Subjective:      Review of Systems   Constitutional: Negative. HENT: Negative. Respiratory: Negative. Genitourinary:        Metastatic prostate cancer on hormone therapy aand markedly elevevated PSA  Some incontinance   Musculoskeletal: Negative. Skin: Negative. Neurological: Negative. Psychiatric/Behavioral: Negative. Objective:     /72 (Site: Right Arm, Position: Sitting, Cuff Size: Large Adult)   Pulse 91   Wt 181 lb (82.1 kg)   SpO2 99%   BMI 23.24 kg/m²     Physical Exam   Constitutional: He is oriented to person, place, and time. No distress. Neck: No JVD present. Cardiovascular: Normal rate, regular rhythm, normal heart sounds and intact distal pulses. Exam reveals no gallop and no friction rub. No murmur heard. Pulmonary/Chest: Effort normal and breath sounds normal. No respiratory distress. He has no wheezes. He has no rales. He exhibits no tenderness. Abdominal: Soft. Bowel sounds are normal. He exhibits no distension. There is no tenderness. There is no guarding. Musculoskeletal: Normal range of motion. He exhibits no edema or tenderness. Lymphadenopathy:     He has no cervical adenopathy. Neurological: He is alert and oriented to person, place, and time. Skin: Skin is warm. No rash noted. He is not diaphoretic. No pallor. Psychiatric: He has a normal mood and affect.  His behavior is normal. Judgment and thought content normal.     CBC:   Lab Results   Component Value Date    WBC 3.1 (L) 11/29/2017    HGB 7.1 (L) 11/29/2017    HCT 22.5 (L) 11/29/2017    MCV 91.5 11/29/2017     (H) 11/29/2017     BMP:    Lab Results   Component Value Date     11/29/2017     11/16/2017     11/13/2017    K 3.4 (L) 11/29/2017    K 4.3 11/16/2017    K 4.3 11/15/2017     11/29/2017     11/16/2017    CL 98 11/13/2017    CO2 30 11/29/2017    CO2 26 11/16/2017    CO2 25 11/13/2017    BUN 11 11/29/2017    BUN 11 11/16/2017    BUN 14 11/13/2017    CREATININE 0.5 11/29/2017    CREATININE 0.5 11/16/2017    CREATININE 0.5 11/13/2017    GLUCOSE 84 11/29/2017    GLUCOSE 87 11/16/2017    GLUCOSE 79 11/13/2017      Hepatic:   Lab Results   Component Value Date    AST 22 11/11/2017    AST 17 11/10/2017    AST 44 02/03/2014    ALT <5 (L) 11/11/2017    ALT <5 (L) 11/10/2017    ALT 24 02/03/2014    BILITOT 0.4 11/11/2017    BILITOT 0.4 11/10/2017    BILITOT 0.4 02/03/2014    ALKPHOS 1,032 (H) 11/11/2017    ALKPHOS 993 (H) 11/10/2017    ALKPHOS 110 02/03/2014     BNP: No results found for: BNP  Lipids: No results found for: CHOL, HDL  INR:   Lab Results   Component Value Date    INR 1.22 (H) 11/11/2017          URINE:   Lab Results   Component Value Date    PROTUR Negative 11/20/2017                               Lab Results   Component Value Date    NITRU Negative 11/20/2017    COLORU Yellow 11/20/2017    COLORU YELLOW 11/12/2017    PHUR 7.5 11/12/2017    WBCUA 50-75 11/12/2017    RBCUA 0-2 11/12/2017    YEAST NONE SEEN 11/12/2017    BACTERIA MANY 11/12/2017    CLARITYU Clear 10/11/2016    SPECGRAV 1.018 06/09/2014    LEUKOCYTESUR MODERATE 11/12/2017    UROBILINOGEN 0.20 11/20/2017    BILIRUBINUR Negative 11/20/2017    BLOODU Negative 11/20/2017    BLOODU NEGATIVE 11/12/2017    GLUCOSEU Negative 11/20/2017    KETUA Trace 11/20/2017         Microalbumen/Creatinine ratio:  No components found for: RUCREAT    Assessment:   Chronic kidney disease stage 1 stable  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

## 2018-02-22 NOTE — TELEPHONE ENCOUNTER
Navigation:     Karthikeyan phoned [facility], spoke with Nyla Almeida not available. Karthikeyan inquiry on pt's return appt to [facility] Clinic for clearance with complete gumline healing, s/p extraction on 2-8-18. NO FU appt currently in place. Karthikeyan was instructed to return phone call on Monday 2-26-18 to speak with Concepcion for appt scheduling.

## 2018-03-08 NOTE — TELEPHONE ENCOUNTER
Patient returned call, advised that he needs to have his testosterone drawn.  Patient voiced understanding and stated he would go tomorrow

## 2018-03-09 NOTE — TELEPHONE ENCOUNTER
Trying to schedule the patient with Dr. Rachel Hunter for Monday March 26, 2018 @ 2:30 patient will not answer his phone and he has no VM. Will attempt to call later.

## 2018-03-12 NOTE — TELEPHONE ENCOUNTER
Patient is wanting to see Dr. Flako Dockery per Tala May.  Patient is scheduled for next Monday the 19th at 11:45

## 2018-03-14 NOTE — TELEPHONE ENCOUNTER
Navigation:     Karthikeyan phoned [employer]:  Shuttle  to transportation to [facility] is scheduled for 7a tomorrow. Karthikeyan phoned pt to remind him of his appt, & he verbalized he is planning to go. Request for [facility] to Shuttle  location. Karthikeyan phoned [employer], ->transportation coordinated to shuttle  location, with  at 615 am.  Karthikeyan phoned pt back to update him on the above arrangements. Karthikeyan also reminded pt that he has an upcoming appt with Dr. Gabbie Harper scheduled on 3-19-18 at [phone]. Karthikeyan offered availability to pt, to attend the appt with him.

## 2018-03-27 NOTE — PROGRESS NOTES
Following [employer] plan of care. FIRMAGON 80 MG GIVEN S.C Lt ABDOMEN. Lot Number: [id number]  Expiration Date:   [facility] #: [id number]    After Injection was given there were no reactions at injection site and patient was feeling well. Patient was notified that possible side effects from injections include: Redness, swelling and itching at the injection site. Possible side effects of androgen deprivation therapy, including hot flashes, flushing of the skin, increased weight, decreased sex drive, and difficulties with ED. Patient was instructed to call the office with any further questions or concerns. Date of last Calcium/Vit D level: 1-8-18  IIs patient on Calcium/Vit D replacement? Yes  Date of last Dexa Scan: never; last whole body bone scan was 11-13-17  Testosterone Level of 3 done on 3-9-18  Psa level of 132 done on 3-9-18    Patient supplied their own medications No    Pt [address] therapy first initiated on 11/23/2015.
his left upper gumline today. Will hold off on initiation of XGEVA until next month if gumline completely healed. He has been cleared by [facility] dental clinic to receive Gal Burnham. Firmagon injection today. F/u in 1 month for Firmagon and possibly XGEVA injections with PSA prior to appt. Office facilitating Kerwin Villareal.

## 2018-06-20 PROBLEM — C79.51 BONE METASTASES (HCC): Status: ACTIVE | Noted: 2018-01-01

## 2018-06-20 PROBLEM — C61 PROSTATE CANCER (HCC): Status: ACTIVE | Noted: 2018-01-01

## 2018-06-21 NOTE — PLAN OF CARE
Problem: Musculor/Skeletal Functional Status  Intervention: Fall precautions  Discussed fall precautions, fall risks, and instructed patient to ask for assistance with ambulation. Goal: Absence of falls  Outcome: Met This Shift  Free from during injections. Problem: Intellectual/Education/Knowledge Deficit  Intervention: Verbal/written education provided  xgeva and lupron reviewed. Patient verbalized understanding of medications being administered and potential side effects. Goal: Teaching initiated upon admission  Outcome: Met This Shift  Patient verbalizes understanding to verbal information given on xgeva and lupron injections,action and possible side effects. Aware to call MD if develop complications. Problem: Discharge Planning  Intervention: Interaction with patient/family and care team  Provide discharge instructions. Goal: Knowledge of discharge instructions  Knowledge of discharge instructions    Outcome: Met This Shift  Verbalized understanding of discharge instructions, follow-up appointments, and when to call the physician. Comments: Care plan reviewed with patient. Patient verbalize understanding of the plan of care and contribute to goal setting.

## 2018-06-21 NOTE — PROGRESS NOTES
Name: Masood Ruiz  : 1950  MRN: [id number]    Oncology Navigation Follow-Up Note    Contact Type:  Medical Oncology- metastatic prostate cancer    Subjective: \"due for my shots, & need some help\". Pt's neighbor, Yanique Calix present at today's appt. Objective: C/O generalized pain from neck to feet->using tylenol for pain, but is unable to share how often he takes the tylenol. Pt admits he doesn't know what he is supposed to be taking medication wise. Pt having trouble with getting out of bed,  dressing himself & ambulation->all related to his pain complaints. Pt appears very thin; weight: 151# 6.4 oz today. Reports \"not eating, no appetite, only drinking those supplements\". Pt does not cook for himself. (Pt neighbor, Lindy Merrill, shares pt weighed 155# a few weeks ago at Dr. Danielle Godoy office). Pt reports he no longer walks everywhere, \"hurt too much\". Pt's neighbor drives him where he needs to go. Pt appears unkept, & shares he does not bathe or launder his clothing at the [address], routinely, but relates this to his landlord informing him he is on a H2O budget & his difficulty with walking because of his pain. Karthikeyan query to pt if he has ever considered living elsewhere, to which pt replied \"no; not going anywhere bc I have my dog (pitbull), that wouldn't be allowed anywhere else, & my dog is my child, & I won't give him up\". Pt became tearful in talking about his dog. Karthikeyan encouraged pt to communicate IF he ever wants to consider other living arrangements, or desires help. Pt evaluated by Dr. Alison Bishop. She shared his progressively rising PSA & increase in pain,  likely represents disease progression. Med-ONC POC:    XGEVA & Lupron injections today. Pt is to continue with the [facility] for now.      DENTAL FU EXAM EVERY 6 MONTHS WHILE ON XGEVA-> will be due 9/15/2018  Pt last dental exam at [facility] was 3/15/2018    Bone Scan, MRI Chest, ABD & Pelvis. Return to Clinic in 2 weeks to review diagnostic results, & review treatment option. RX: Ultram 1 tab every 6-8 hours for pain   OTC Stool Softener 1 tab per day with 1 prune to avoid constipation with the pain medication. Dr. Serge Rivas also requested Karthikeyan obtain info for Meals on Wheels for pt. She explained she desires his nutritional status to be improved. (Karthikeyan also provided additional samples of nutritional supplements for pt consumption). (Karthikeyan also outreached to [facility], [facility], with inquiry about nutritional supplements, depends, & RX for Ultram coverage, if that is something he would qualify for- TOMA left with request for return phone call). Pt was in agreement with the above POC. Assistance Needed: Pt's neighbor, Abraham May will assist pt with his medication regimen. Karthikeyan discussed option for pillbox, OR designated morning, afternoon, evening baskets that his meds can be placed in to assist with when he is supposed to be taking them. Referrals: JOE kemp for info on Meals on Wheels; info provided. Abraham May will assist pt with coordinating the meal program, IF he decides to proceed with them. [facility] for dental following while receiving Lova Resendez therapy-> Karthikeyan phoned: [phone] ext [phone], TOMA left with request for return phone call for appt coordination in September 2018. Education: Reiterated with pt & his neighbor friend, Abraham May, the need for nutrition, adhering to his medication regimen, & improve his overall hygiene with bathing, to minimize his risk for infection. Notes: Karthikeyan remains available to assist pt as needed. [facility] administered today. XTANDI to continue as previously initiated by [facility].

## 2018-06-22 NOTE — PROGRESS NOTES
Patient assessed for the following post lupron and xgeva injections:     Dizziness   No  Lightheadedness  No      Acute nausea/vomiting No  Headache   No  Chest pain/pressure  No  Rash/itching   No  Shortness of breath  No     Patient tolerated xgeva subq injection and lupron IM injection without any complications. Last vital signs:   /64   Pulse 67   Temp 97.8 °F (36.6 °C) (Oral)   Resp 16   Ht 5' 10\" (1.778 m)   Wt 151 lb 6.4 oz (68.7 kg)   SpO2 92%   BMI 21.72 kg/m²     Patient instructed if experience any of the above symptoms following today's injections, he/she is to notify MD immediately or go to the emergency department. Discharge instructions given to patient. Verbalizes understanding. Ambulated off unit per self with belongings.

## 2018-07-05 NOTE — PROGRESS NOTES
is irregular ill-defined increased activity in the skull, right frontal region.       Mottled increased activity on most of the ribs. Similar finding can be seen with the vertebral bodies particularly from the anterior posterior projection this is well seen in the lower lumbar bodies. Increased activity in the proximal femora bilaterally    in addition to more focal areas of increased activity in the femoral diaphyses findings are consistent with widespread prostatic disease which can be seen on the patient's recent CT scan abdomen pelvis. While in the hospital the patient received Pavel Annie and was started on Casodex. In November 2017 he underwent cystoscopy, no bladder abnormality was found. Sometime in December 2017, January 2018, he started Suresh-barre injection. From January till May 2018 he continued  Firmagon injections. However his PSA was increasing very quickly. His PSA doubled between March 9, 2018 and April 25, 2018. Most recent PSA on May 21, 2018 was 574. Sometime in May the patient was started on XTANDI. On 5/27/18 pt presented to [facility] ER for weakness and pain. Interim history on July 5, 2018: The patient reports that his diffuse bone pain continues to worsen. He continues having poor appetite and his weight continues to decrease. No fever/chills. His neibashvin Yun has been buying him Ensure to get his weight up. The patient presents to the clinic to review results of imaging studies.        HPI   Past Medical History:   Diagnosis Date    Backache     Cancer [facility]) 2011    prostate    Goiter     Hyperlipidemia     Hypertension     Hypothyroidism     Incontinence of urine 2/28/2012    Osteoarthritis       Past Surgical History:   Procedure Laterality Date    COLONOSCOPY      NM COLSC FLX W/RMVL OF TUMOR POLYP LESION SNARE TQ N/A 11/14/2017    COLONOSCOPY POLYPECTOMY SNARE/COLD BIOPSY performed by Flaco Hudson MD at [facility]    NM ESOPHAGOGASTRODUODENOSCOPY osseous metastatic disease. CT of the chest on June 29, 2018 showed:  1. No acute intrathoracic findings. 2. Extensive osseous metastatic disease. Bone scan on June 29, 2000 818 showed:  1. Foci of abnormal radiotracer accumulation throughout the visualized skeleton consistent with diffuse osseous metastatic disease and stable compared to the prior examination. 2. Probable degenerative arthropathic changes as detailed above       Assessment:        Diagnosis Orders   1. CA of prostate (HCC)  Potassium    methadone (DOLOPHINE) 5 MG tablet   2. Bone metastases (HCC)  methadone (DOLOPHINE) 5 MG tablet   3. Cancer related pain  methadone (DOLOPHINE) 5 MG tablet        Plan: 1. Metastatic prostate cancer. The patient is currently receiving second line ADT in the form of [place] after he progressed on Casodex. His PSA continues to increase. It almost doubles every month. The patient didn't have any imaging studies since January 2018. Therefore he had CT of the chest abdomen pelvis and bone scan, they confirmed extensive skeletal involvement and retroperitoneal lymphadenopathy. Treatment for patients with disseminated prostate cancer that has progressed on androgen deprivation therapy and abiraterone is Taxotere chemotherapy in combination with prednisone that has been shown to significantly prolong overall survival. The  potential toxicities associated with Taxotere (myelosuppression, febrile infections, neuropathy) might be difficult to monitor in the patient who was noncompliant in the past.  Currently he has very good social support from his neighbor Mrs. Henderson. Therefore we discussed schedule and side effects from chemotherapy today. In preparation for chemotherapy the patient will need port placement since his peripheral veins are poor. He will attend chemotherapy teaching was the nurses.   He will return to clinic next week to receive first dose cycle of Taxotere and prednisone

## 2018-07-08 NOTE — ED PROVIDER NOTES
shortness of breath and wheezing. Cardiovascular: Negative for chest pain and palpitations. Gastrointestinal: Positive for constipation, nausea and vomiting. Negative for abdominal pain. Genitourinary: Negative for decreased urine volume, difficulty urinating, dysuria, flank pain, frequency, hematuria and urgency. Musculoskeletal: Positive for myalgias. Negative for arthralgias. Skin: Negative for color change, pallor and rash. Allergic/Immunologic: Negative for immunocompromised state. Hematological: Bruises/bleeds easily (ASA daily). PAST MEDICAL HISTORY    has a past medical history of Backache; Cancer ([facility] [address] Goiter; Hyperlipidemia; Hypertension; Hypothyroidism; Incontinence of urine; and Osteoarthritis. SURGICAL HISTORY      has a past surgical history that includes Prostate surgery (2011); Thyroid surgery (2011); Colonoscopy; pr esophagogastroduodenoscopy transoral diagnostic (N/A, 11/14/2017); and pr colsc flx w/rmvl of tumor polyp lesion snare tq (N/A, 11/14/2017). CURRENT MEDICATIONS       Discharge Medication List as of 7/7/2018 11:33 PM      CONTINUE these medications which have NOT CHANGED    Details   methadone (DOLOPHINE) 5 MG tablet Take 1 tablet by mouth every 6 hours as needed for Pain for up to 30 days. ., Disp-90 tablet, R-0Normal      vitamin B-6 (PYRIDOXINE) 100 MG tablet Take 100 mg by mouth dailyHistorical Med      atenolol (TENORMIN) 25 MG tablet Take 25 mg by mouth dailyHistorical Med      cephALEXin (KEFLEX) 500 MG capsule Take 500 mg by mouth 2 times dailyHistorical Med      Cholecalciferol (VITAMIN D-3) 1000 units CAPS Take 1 capsule by mouth dailyHistorical Med      calcium carbonate-vitamin D (CALCIUM 600 + D) 600-400 MG-UNIT TABS per tab Take 1 tablet by mouth 2 times daily, Disp-180 tablet, R-3Normal      acetaminophen (TYLENOL) 325 MG tablet Take 2 tablets by mouth every 4 hours as needed for Pain, Disp-120 tablet, R-3OTC      Multiple Vitamins-Minerals (MENS MULTI VITAMIN & MINERAL PO) Take by mouth      levothyroxine (SYNTHROID) 100 MCG tablet Take 125 mcg by mouth Daily Historical Med      aspirin 325 MG tablet Take 325 mg by mouth every 48 hours       pravastatin (PRAVACHOL) 40 MG tablet Take 40 mg by mouth daily. ALLERGIES     has No Known Allergies. FAMILY HISTORY     indicated that his mother is . He indicated that his father is . He indicated that his sister is alive. He indicated that his brother is alive. family history includes Cancer in his father; Diabetes in his mother; High Blood Pressure in his mother. SOCIAL HISTORY      reports that he quit smoking about 36 years ago. His smoking use included Cigarettes, Pipe, and Cigars. He has never used smokeless tobacco. He reports that he drinks alcohol. He reports that he does not use drugs. PHYSICAL EXAM       ED Triage Vitals   BP Temp Temp Source Pulse Resp SpO2 Height Weight   18   93/64 98.1 °F (36.7 °C) Oral 93 18 97 % 5' 10\" (1.778 m) 143 lb (64.9 kg)      Physical Exam   Constitutional: He is oriented to person, place, and time. Non-toxic appearance. No distress. HENT:   Head: Normocephalic and atraumatic. Right Ear: Hearing normal.   Left Ear: Hearing normal.   Nose: Nose normal. No rhinorrhea. Mouth/Throat: Uvula is midline and oropharynx is clear and moist. Mucous membranes are dry. Eyes: Conjunctivae and EOM are normal. Pupils are equal, round, and reactive to light. No scleral icterus. Neck: No muscular tenderness present. No neck rigidity. No tracheal deviation present. Cardiovascular: Normal rate, regular rhythm and normal heart sounds. No murmur heard. Pulmonary/Chest: Effort normal and breath sounds normal. No stridor. No respiratory distress. He has no decreased breath sounds. He has no wheezes. Abdominal: Soft.  Normal appearance and bowel sounds are normal. He exhibits no distension and no pulsatile midline mass. There is no tenderness. There is no rigidity, no rebound, no guarding, no CVA tenderness, no tenderness at McBurney's point and negative Santacruz's sign. No hernia. Musculoskeletal: Normal range of motion. Lymphadenopathy:     He has no cervical adenopathy. Neurological: He is alert and oriented to person, place, and time. He has normal strength. No sensory deficit. Gait normal.   Skin: Skin is warm, dry and intact. No rash noted. He is not diaphoretic. No pallor. Psychiatric: He has a normal mood and affect. His speech is normal and behavior is normal. Thought content normal.   Nursing note and vitals reviewed. DIFFERENTIAL DIAGNOSIS:   Gastritis, duodenitis, pancreatitis, cholecystitis, nausea and vomiting secondary to chemotherapy treatment    DIAGNOSTIC RESULTS     EKG: All EKG's are interpreted by the Emergency Department Physician who either signs or Co-signs this chart in the absence of a cardiologist.  None    RADIOLOGY: non-plain film images(s) such as CT, Ultrasound and MRI are read by the radiologist.  XR ABDOMEN (KUB) (SINGLE AP VIEW)   Final Result   Constipation. No acute process. Extensive osseous metastatic disease. **This report has been created using voice recognition software. It may contain minor errors which are inherent in voice recognition technology. **      Final report electronically signed by Dr. Fang Lowe on 7/7/2018 8:46 PM          LABS:   Results for orders placed or performed during the hospital encounter of 07/07/18   CBC auto differential   Result Value Ref Range    WBC 5.1 4.8 - 10.8 thou/mm3    RBC 2.99 (L) 4.70 - 6.10 mill/mm3    Hemoglobin 8.2 (L) 14.0 - 18.0 gm/dl    Hematocrit 25.9 (L) 42.0 - 52.0 %    MCV 86.6 80.0 - 94.0 fL    MCH 27.4 26.0 - 33.0 pg    MCHC 31.7 (L) 32.2 - 35.5 gm/dl    RDW-CV 20.1 (H) 11.5 - 14.5 %    RDW-SD 62.9 (H) 35.0 - 45.0 fL    Platelets 652 130 - 400 thou/mm3    MPV 9.6 9.4 - 12.4 fL    Seg Neutrophils 62.9 %    Lymphocytes 27.6 %    Monocytes 6.9 %    Eosinophils 0.2 %    Basophils 0.4 %    Immature Granulocytes 2.0 %    Segs Absolute 3.2 1.8 - 7.7 thou/mm3    Lymphocytes # 1.4 1.0 - 4.8 thou/mm3    Monocytes # 0.4 0.4 - 1.3 thou/mm3    Eosinophils # 0.0 0.0 - 0.4 thou/mm3    Basophils # 0.0 0.0 - 0.1 thou/mm3    Immature Grans (Abs) 0.10 (H) 0.00 - 0.07 thou/mm3    nRBC 5 /100 wbc   Basic Metabolic Panel   Result Value Ref Range    Sodium 139 135 - 145 meq/L    Potassium 3.4 (L) 3.5 - 5.2 meq/L    Chloride 95 (L) 98 - 111 meq/L    CO2 24 23 - 33 meq/L    Glucose 128 (H) 70 - 108 mg/dL    BUN 35 (H) 7 - 22 mg/dL    CREATININE 1.2 0.4 - 1.2 mg/dL    Calcium 8.4 (L) 8.5 - 10.5 mg/dL   Hepatic function panel   Result Value Ref Range    Alb 3.9 3.5 - 5.1 g/dL    Total Bilirubin 0.5 0.3 - 1.2 mg/dL    Bilirubin, Direct <0.2 0.0 - 0.3 mg/dL    Alkaline Phosphatase 348 (H) 38 - 126 U/L    AST 54 (H) 5 - 40 U/L    ALT 7 (L) 11 - 66 U/L    Total Protein 7.7 6.1 - 8.0 g/dL   Lipase   Result Value Ref Range    Lipase 37.8 5.6 - 51.3 U/L   Anion Gap   Result Value Ref Range    Anion Gap 20.0 (H) 8.0 - 16.0 meq/L   Glomerular Filtration Rate, Estimated   Result Value Ref Range    Est, Glom Filt Rate 73 (A) ml/min/1.73m2   Osmolality   Result Value Ref Range    Osmolality Calc 287.2 275.0 - 300 mOsmol/kg       EMERGENCY DEPARTMENT COURSE:   Vitals:    Vitals:    07/07/18 1952 07/07/18 2105 07/07/18 2216 07/07/18 2319   BP:  104/69 108/69 106/69   Pulse: 93 87 90 88   Resp:  18 18 16   Temp: 98.1 °F (36.7 °C)      TempSrc: Oral      SpO2: 97% 99% 99% 100%   Weight:       Height:           Orders Placed This Encounter   Medications    0.9 % sodium chloride bolus    DISCONTD: ondansetron (ZOFRAN) injection 4 mg    DISCONTD: 0.9 % sodium chloride bolus    polyethylene glycol (MIRALAX) packet     Sig: Take 17 g by mouth daily as needed for Constipation     Dispense: 14 each     Refill:  0    ondansetron (ZOFRAN) 4 MG tablet     Sig: Take 1 tablet by mouth every 8 hours as needed for Nausea or Vomiting     Dispense:  15 tablet     Refill:  0       Patient was seen history and physical exam was performed. Patient remained stable here in the emergency department. Patient was resting on cart during examination. Patient did not appear in any distress. On exam, patient's oral mucosa appears dry. Remaining examination was benign. Patient was given IV fluids and Zofran with symptom improvement. Labs were ordered and reviewed. Imaging was ordered and reviewed. KUB showed constipation without other acute process. There is presence of extensive osseous metastatic disease. Lab and imaging results were discussed with patient. Repeat physical examination remained benign. The patient will be sent home with Mirala. The patient was comfortable with the plan of discharge home and to follow up with his PCP in the next week. Anticipatory guidance was given. The patient is instructed to return to the emergency department for any worsening of their symptoms. Patient was discharged from the emergency department in good condition with all questions answered. See disposition below      CRITICAL CARE:  None    CONSULTS:  None    PROCEDURES:  None. FINAL IMPRESSION      1. Non-intractable vomiting with nausea, unspecified vomiting type    2. Dehydration    3.  Other constipation          DISPOSITION/PLAN   Discharge    PATIENT REFERRED TO:  Sherry Kaur, [facility], [facility] [address] [facility] [address]  [phone]    Schedule an appointment as soon as possible for a visit in 1 week        DISCHARGE MEDICATIONS:  Discharge Medication List as of 7/7/2018 11:33 PM      START taking these medications    Details   polyethylene glycol (MIRALAX) packet Take 17 g by mouth daily as needed for Constipation, Disp-14 each, R-0Print      ondansetron (ZOFRAN) 4 MG tablet Take 1 tablet by mouth every 8 hours as needed for Nausea or Vomiting, Disp-15 tablet, R-0Print                 Scribe:   7/7/18 8:15 PM [facility] for and in the presence of Lamont Ramos MD.    Signed by: Kathy Parsons, 07/09/18 1:41 AM    Provider:  I personally performed the services described in the documentation, reviewed and edited the documentation which was dictated to the scribe in my presence, and it accurately records my words and actions.     Lamont Ramos MD 7/7/18 1:41 AM            Lamont Ramos MD  07/09/18 [id number]

## 2018-07-09 NOTE — TELEPHONE ENCOUNTER
Navigation:     Received phone call from Astrid-pt neighbor sharing pt was in ER on Saturday with nausea, constipation, pain, dehydration. Pt was given IVF & discharged with Miralax. Pt cont to have minimal intake. Karthikeyan encouraged Astrid to promote the nutritional supplements to pt. Purnima Griffin shares pt appt with Dr. María Echeverria tomorrow for discussion about port placement for chemotherapy. Port scheduled to be placed on 7-12, followed by appt at [facility] for his 1st Chemo tx. Astrid inquiry about RX for pain med that Dr. Wong Enter discussed at his appt last week. Karthikeyan notes Methadone RX was e-scribed to [facility] on [address] [place] [address] to  today. Karthikeyan remains available to assist as needed.

## 2018-07-10 NOTE — PROGRESS NOTES
[facility] PROFESSIONAL SERVS  [facility]  [address]  [address] [address]  Dept: [phone]  Dept Fax: [phone]  Loc: [phone]    Visit Date: 7/10/2018    Wilbur Murphy Sr. is a 78 y.o. male who presents today for:  Chief Complaint   Patient presents with    Surgical Consult     Est Pt- Ref. [facility] Placement- Chemo begins 7/12/18        HPI:     HPI 61-year-old black male who I seen in 2011 performing a total thyroidectomy for Graves' disease the patient has developed prostate cancer had a robotic prostatectomy but now has metastatic prostate cancer to his bones.   He has seen oncology is in need of chemotherapy and has been referred for a MediPort placement he was actually referred to Stanislaw Lang or Feliciano Whelan but since I performed the surgery previously, I see him today to discuss MediPort placement should be noted Suha Henderson cancer nurse coordinator is present in the room also    Past Medical History:   Diagnosis Date    Backache     Cancer [facility]) 2011    prostate    Goiter     Hyperlipidemia     Hypertension     Hypothyroidism     Incontinence of urine 2/28/2012    Osteoarthritis       Past Surgical History:   Procedure Laterality Date    COLONOSCOPY      MI COLSC FLX W/RMVL OF TUMOR POLYP LESION SNARE TQ N/A 11/14/2017    COLONOSCOPY POLYPECTOMY SNARE/COLD BIOPSY performed by Bautista Kaba MD at [facility]    MI ESOPHAGOGASTRODUODENOSCOPY TRANSORAL DIAGNOSTIC N/A 11/14/2017    EGD ESOPHAGOGASTRODUODENOSCOPY performed by Bautista Kaba MD at [address] [place] [address]  2011    Dr. Jaleesa Albrecht     Family History   Problem Relation Age of Onset    High Blood Pressure Mother     Diabetes Mother     Cancer Father         throat     Social History   Substance Use Topics    Smoking status: Former Smoker     Types: Cigarettes, Pipe, Cigars     Quit date: 10/4/1981    Smokeless tobacco: Never Used      Comment: social smoker breath, wheezing and stridor. Cardiovascular: Negative for chest pain, palpitations and leg swelling. Endocrine: Negative for cold intolerance, heat intolerance, polydipsia, polyphagia and polyuria. Genitourinary: Positive for difficulty urinating. Negative for decreased urine volume, discharge, dysuria, enuresis, flank pain, frequency, genital sores, hematuria, penile pain, penile swelling, scrotal swelling, testicular pain and urgency. Musculoskeletal: Positive for arthralgias and back pain. Negative for gait problem, joint swelling, myalgias, neck pain and neck stiffness. Skin: Negative for color change, pallor, rash and wound. Allergic/Immunologic: Negative for environmental allergies, food allergies and immunocompromised state. Neurological: Negative for dizziness, tremors, seizures, syncope, facial asymmetry, speech difficulty, weakness, light-headedness, numbness and headaches. Hematological: Negative for adenopathy. Does not bruise/bleed easily. Psychiatric/Behavioral: Negative for agitation, behavioral problems, confusion, decreased concentration, dysphoric mood, hallucinations, self-injury, sleep disturbance and suicidal ideas. The patient is not nervous/anxious and is not hyperactive.         Objective:   BP 90/62 (Site: Left Arm, Position: Sitting, Cuff Size: Medium Adult)   Pulse 96   Temp 97.4 °F (36.3 °C) (Tympanic)   Resp 18   Ht 6' (1.829 m)   Wt 145 lb (65.8 kg)   SpO2 100%   BMI 19.67 kg/m²     Physical Exam    Results for orders placed or performed during the hospital encounter of 07/07/18   CBC auto differential   Result Value Ref Range    WBC 5.1 4.8 - 10.8 thou/mm3    RBC 2.99 (L) 4.70 - 6.10 mill/mm3    Hemoglobin 8.2 (L) 14.0 - 18.0 gm/dl    Hematocrit 25.9 (L) 42.0 - 52.0 %    MCV 86.6 80.0 - 94.0 fL    MCH 27.4 26.0 - 33.0 pg    MCHC 31.7 (L) 32.2 - 35.5 gm/dl    RDW-CV 20.1 (H) 11.5 - 14.5 %    RDW-SD 62.9 (H) 35.0 - 45.0 fL    Platelets 835 952 - 031 thou/mm3 MD on 7/10/2018 at 1:16 PM

## 2018-07-10 NOTE — PROGRESS NOTES
Navigation:     Karthikeyan met with pt at his pre-op appt with Dr. Ailyn Baer. Plan for mediport placement tomorrow in Same Day Surgery; pt arrival at 11a;  NPO after midnight tonight, however was directed to take his antiemetic & BP med with a swallow of H2O prior to coming to hosp for the procedure. Pt neighbor, Rock Cabral, present at appt, & will assist in reminding pt of the NPO status, taking his meds, & transport him for the procedure tomorrow. Pt appt at [facility] at 3p 7-11-18 for chemo teaching, with plan to start chemo on 7-12-18. Karthikeyan remains available to assist as needed.

## 2018-07-10 NOTE — H&P
 Alcohol use Yes      Comment: ocassionally       Current Outpatient Prescriptions   Medication Sig Dispense Refill    polyethylene glycol (MIRALAX) packet Take 17 g by mouth daily as needed for Constipation 14 each 0    ondansetron (ZOFRAN) 4 MG tablet Take 1 tablet by mouth every 8 hours as needed for Nausea or Vomiting 15 tablet 0    methadone (DOLOPHINE) 5 MG tablet Take 1 tablet by mouth every 6 hours as needed for Pain for up to 30 days. . 90 tablet 0    vitamin B-6 (PYRIDOXINE) 100 MG tablet Take 100 mg by mouth daily      atenolol (TENORMIN) 25 MG tablet Take 25 mg by mouth daily      Cholecalciferol (VITAMIN D-3) 1000 units CAPS Take 1 capsule by mouth daily      calcium carbonate-vitamin D (CALCIUM 600 + D) 600-400 MG-UNIT TABS per tab Take 1 tablet by mouth 2 times daily 180 tablet 3    Multiple Vitamins-Minerals (MENS MULTI VITAMIN & MINERAL PO) Take by mouth      levothyroxine (SYNTHROID) 100 MCG tablet Take 125 mcg by mouth Daily       aspirin 325 MG tablet Take 325 mg by mouth every 48 hours       pravastatin (PRAVACHOL) 40 MG tablet Take 40 mg by mouth daily.  acetaminophen (TYLENOL) 325 MG tablet Take 2 tablets by mouth every 4 hours as needed for Pain 120 tablet 3     No current facility-administered medications for this visit. No Known Allergies    Subjective:      Review of Systems   Constitutional: Positive for appetite change. Negative for activity change, chills, diaphoresis, fatigue, fever and unexpected weight change. HENT: Positive for trouble swallowing. Negative for congestion, dental problem, drooling, ear discharge, ear pain, facial swelling, hearing loss, mouth sores, nosebleeds, postnasal drip, rhinorrhea, sinus pain, sinus pressure, sneezing, sore throat, tinnitus and voice change. Eyes: Negative for photophobia, pain, discharge, redness, itching and visual disturbance.    Respiratory: Negative for apnea, cough, choking, chest tightness, shortness of breath, wheezing and stridor. Cardiovascular: Negative for chest pain, palpitations and leg swelling. Endocrine: Negative for cold intolerance, heat intolerance, polydipsia, polyphagia and polyuria. Genitourinary: Positive for difficulty urinating. Negative for decreased urine volume, discharge, dysuria, enuresis, flank pain, frequency, genital sores, hematuria, penile pain, penile swelling, scrotal swelling, testicular pain and urgency. Musculoskeletal: Positive for arthralgias and back pain. Negative for gait problem, joint swelling, myalgias, neck pain and neck stiffness. Skin: Negative for color change, pallor, rash and wound. Allergic/Immunologic: Negative for environmental allergies, food allergies and immunocompromised state. Neurological: Negative for dizziness, tremors, seizures, syncope, facial asymmetry, speech difficulty, weakness, light-headedness, numbness and headaches. Hematological: Negative for adenopathy. Does not bruise/bleed easily. Psychiatric/Behavioral: Negative for agitation, behavioral problems, confusion, decreased concentration, dysphoric mood, hallucinations, self-injury, sleep disturbance and suicidal ideas. The patient is not nervous/anxious and is not hyperactive.         Objective:   BP 90/62 (Site: Left Arm, Position: Sitting, Cuff Size: Medium Adult)   Pulse 96   Temp 97.4 °F (36.3 °C) (Tympanic)   Resp 18   Ht 6' (1.829 m)   Wt 145 lb (65.8 kg)   SpO2 100%   BMI 19.67 kg/m²     Physical Exam    Results for orders placed or performed during the hospital encounter of 07/07/18   CBC auto differential   Result Value Ref Range    WBC 5.1 4.8 - 10.8 thou/mm3    RBC 2.99 (L) 4.70 - 6.10 mill/mm3    Hemoglobin 8.2 (L) 14.0 - 18.0 gm/dl    Hematocrit 25.9 (L) 42.0 - 52.0 %    MCV 86.6 80.0 - 94.0 fL    MCH 27.4 26.0 - 33.0 pg    MCHC 31.7 (L) 32.2 - 35.5 gm/dl    RDW-CV 20.1 (H) 11.5 - 14.5 %    RDW-SD 62.9 (H) 35.0 - 45.0 fL    Platelets 239 101 - 052 thou/mm3 MPV 9.6 9.4 - 12.4 fL    Seg Neutrophils 62.9 %    Lymphocytes 27.6 %    Monocytes 6.9 %    Eosinophils 0.2 %    Basophils 0.4 %    Immature Granulocytes 2.0 %    Segs Absolute 3.2 1.8 - 7.7 thou/mm3    Lymphocytes # 1.4 1.0 - 4.8 thou/mm3    Monocytes # 0.4 0.4 - 1.3 thou/mm3    Eosinophils # 0.0 0.0 - 0.4 thou/mm3    Basophils # 0.0 0.0 - 0.1 thou/mm3    Immature Grans (Abs) 0.10 (H) 0.00 - 0.07 thou/mm3    nRBC 5 /100 wbc   Basic Metabolic Panel   Result Value Ref Range    Sodium 139 135 - 145 meq/L    Potassium 3.4 (L) 3.5 - 5.2 meq/L    Chloride 95 (L) 98 - 111 meq/L    CO2 24 23 - 33 meq/L    Glucose 128 (H) 70 - 108 mg/dL    BUN 35 (H) 7 - 22 mg/dL    CREATININE 1.2 0.4 - 1.2 mg/dL    Calcium 8.4 (L) 8.5 - 10.5 mg/dL   Hepatic function panel   Result Value Ref Range    Alb 3.9 3.5 - 5.1 g/dL    Total Bilirubin 0.5 0.3 - 1.2 mg/dL    Bilirubin, Direct <0.2 0.0 - 0.3 mg/dL    Alkaline Phosphatase 348 (H) 38 - 126 U/L    AST 54 (H) 5 - 40 U/L    ALT 7 (L) 11 - 66 U/L    Total Protein 7.7 6.1 - 8.0 g/dL   Lipase   Result Value Ref Range    Lipase 37.8 5.6 - 51.3 U/L   Anion Gap   Result Value Ref Range    Anion Gap 20.0 (H) 8.0 - 16.0 meq/L   Glomerular Filtration Rate, Estimated   Result Value Ref Range    Est, Glom Filt Rate 73 (A) ml/min/1.73m2   Osmolality   Result Value Ref Range    Osmolality Calc 287.2 275.0 - 300 mOsmol/kg       Assessment:     Metastatic prostate cancer in need of MediPort discussed procedure with the patient he would like to proceed so chemotherapy can start    Plan:     1. Schedule [place] for MediPort placement  2. The risks, benefits and alternatives were discussed with [facility]. He understands and wishes to proceed with surgical intervention. 3. Restrictions discussed with [place] and he expresses understanding. 4. He is advised to call back directly if there are further questions/concerns, or if his symptoms worsen prior to surgery.             Electronically signed by [facility] [address],

## 2018-07-11 PROBLEM — C79.9 METASTATIC CANCER (HCC): Status: ACTIVE | Noted: 2018-01-01

## 2018-07-11 NOTE — ANESTHESIA POSTPROCEDURE EVALUATION
Department of Anesthesiology  Postprocedure Note    Patient: Apoorva Pedroza MRN: [id number]  YOB: 1950  Date of evaluation: 7/11/2018  Time:  6:13 PM     Procedure Summary     Date:  07/11/18 Room / Location:  [address] [facility]    Anesthesia Start:  1244 Anesthesia Stop:  0735    Procedure:  SINGLE LUMEN MEDIPORT INSERTION (N/A Chest) Diagnosis:  (PROSTATE CA)    Surgeon: Hussain Portillo MD Responsible Provider:  Forest Apley, MD    Anesthesia Type:  MAC ASA Status:  3          Anesthesia Type: MAC    Adamaris Phase I: Adamaris Score: 10    Adamaris Phase II: Adamaris Score: 10    Last vitals: Reviewed and per EMR flowsheets.        Anesthesia Post Evaluation    Patient location during evaluation: bedside  Patient participation: complete - patient participated  Level of consciousness: awake and alert  Airway patency: patent  Nausea & Vomiting: no vomiting and no nausea  Complications: no  Cardiovascular status: hemodynamically stable  Respiratory status: acceptable  Hydration status: stable

## 2018-07-11 NOTE — ANESTHESIA PRE PROCEDURE
Department of Anesthesiology  Preprocedure Note       Name:  Anjana Vargas.   Age:  78 y.o.  :  1950                                          MRN:  [id number]         Date:  2018      Surgeon: Jessica Beaulieu):  Leon Herzog MD    Procedure: Procedure(s):  SINGLE LUMEN MEDIPORT INSERTION    Medications prior to admission:   Prior to Admission medications    Medication Sig Start Date End Date Taking? Authorizing Provider   polyethylene glycol (MIRALAX) packet Take 17 g by mouth daily as needed for Constipation 18  Hair Piper MD   ondansetron [facility]) 4 MG tablet Take 1 tablet by mouth every 8 hours as needed for Nausea or Vomiting 18   Hair Piper MD   methadone (DOLOPHINE) 5 MG tablet Take 1 tablet by mouth every 6 hours as needed for Pain for up to 30 days. . 18  Carrol Angel MD   vitamin B-6 (PYRIDOXINE) 100 MG tablet Take 100 mg by mouth daily    Historical Provider, MD   atenolol (TENORMIN) 25 MG tablet Take 25 mg by mouth daily    Historical Provider, MD   Cholecalciferol (VITAMIN D-3) 1000 units CAPS Take 1 capsule by mouth daily    Historical Provider, MD   calcium carbonate-vitamin D (CALCIUM 600 + D) 600-400 MG-UNIT TABS per tab Take 1 tablet by mouth 2 times daily 17   MILAD Kruse - CNP   acetaminophen (TYLENOL) 325 MG tablet Take 2 tablets by mouth every 4 hours as needed for Pain 17   Milagros Danielle MD   Multiple Vitamins-Minerals (MENS MULTI VITAMIN & MINERAL PO) Take by mouth    Historical Provider, MD   levothyroxine (SYNTHROID) 100 MCG tablet Take 125 mcg by mouth Daily     Historical Provider, MD   aspirin 325 MG tablet Take 325 mg by mouth every 48 hours     Historical Provider, MD   pravastatin (PRAVACHOL) 40 MG tablet Take 40 mg by mouth daily.     Historical Provider, MD       Current medications:    Current Facility-Administered Medications   Medication Dose Route Frequency Provider Last Rate Last Dose    COLONOSCOPY POLYPECTOMY SNARE/COLD BIOPSY performed by John Bhat MD at [facility]    AK ESOPHAGOGASTRODUODENOSCOPY TRANSORAL DIAGNOSTIC N/A 11/14/2017    EGD ESOPHAGOGASTRODUODENOSCOPY performed by John Bhat MD at [address] [facility]  2011    Dr. Mary Ann Baxter       Social History:    Social History   Substance Use Topics    Smoking status: Former Smoker     Types: Cigarettes, Pipe, Cigars     Quit date: 10/4/1981    Smokeless tobacco: Never Used      Comment: social smoker    Alcohol use Yes      Comment: ocassionally                                Counseling given: Not Answered      Vital Signs (Current):   Vitals:    07/11/18 1136   Resp: 16   Temp: 97.6 °F (36.4 °C)   TempSrc: Oral                                              BP Readings from Last 3 Encounters:   07/10/18 90/62   07/07/18 106/69   07/05/18 (!) 97/55       NPO Status:                                                                                 BMI:   Wt Readings from Last 3 Encounters:   07/10/18 145 lb (65.8 kg)   07/07/18 143 lb (64.9 kg)   07/05/18 143 lb 12.8 oz (65.2 kg)     There is no height or weight on file to calculate BMI.    CBC:   Lab Results   Component Value Date    WBC 5.1 07/07/2018    RBC 2.99 07/07/2018    RBC 4.29 01/23/2012    HGB 8.2 07/07/2018    HCT 25.9 07/07/2018    MCV 86.6 07/07/2018    RDW 16.1 06/21/2018     07/07/2018       CMP:   Lab Results   Component Value Date     07/07/2018    K 3.4 07/07/2018    CL 95 07/07/2018    CO2 24 07/07/2018    BUN 35 07/07/2018    CREATININE 1.2 07/07/2018    LABGLOM 73 07/07/2018    GLUCOSE 128 07/07/2018    GLUCOSE 92 10/08/2011    PROT 7.7 07/07/2018    CALCIUM 8.4 07/07/2018    BILITOT 0.5 07/07/2018    ALKPHOS 348 07/07/2018    AST 54 07/07/2018    ALT 7 07/07/2018       POC Tests: No results for input(s): POCGLU, POCNA, POCK, POCCL, POCBUN, POCHEMO, POCHCT in the last 72 hours.     Coags:   Lab Results Component Value Date    INR 1.22 11/11/2017    APTT 23.7 11/11/2017       HCG (If Applicable): No results found for: PREGTESTUR, PREGSERUM, HCG, HCGQUANT     ABGs: No results found for: PHART, PO2ART, EVK5PDY, XXK9PDL, BEART, H4MDWJUD     Type & Screen (If Applicable):  Lab Results   Component Value Date    [address] POS 11/11/2017       Anesthesia Evaluation  Patient summary reviewed and Nursing notes reviewed no history of anesthetic complications:   Airway: Mallampati: III  TM distance: >3 FB   Neck ROM: full  Mouth opening: > = 3 FB Dental:    (+) upper dentures      Pulmonary:Negative Pulmonary ROS                              Cardiovascular:    (+) hypertension: moderate,     (-) past MI, CAD, dysrhythmias,  angina and  CHF    ECG reviewed               Beta Blocker:  Dose within 24 Hrs         Neuro/Psych:                ROS comment: Metastatic prostate CA to bone GI/Hepatic/Renal: Neg GI/Hepatic/Renal ROS            Endo/Other:    (+) hypothyroidism::., .                 Abdominal:           Vascular:                                        Anesthesia Plan      MAC     ASA 3       Induction: intravenous. MIPS: Prophylactic antiemetics administered. Anesthetic plan and risks discussed with patient. Plan discussed with CRNA. Ailyn Ahmadi MD   7/11/2018

## 2018-07-11 NOTE — PROGRESS NOTES
Patient admitted to [facility] room 12. Fall band placed. rut sCDS on. Patient and friend oriented to room and call light.  Pain goal after surgery is 4/10

## 2018-07-12 NOTE — PROGRESS NOTES
Patient and caregiver taylor instructed on chemotherapy specifically the drug Taxotere and  chemotherapy regimen. They were also instructed to take prednisone twice a day with food . The [employer] folder along with the following - chemotherapy drug information sheets,chemotherapy side effects handouts,Understanding your blood counts, [place] diet,Importance to hydration,when to call physician sheet,reduce risk infection sheet,bleeding precaution,neutropenia precaution,Chemotherapy and You and Eating Hints books was included and reviewed. All questions answered satisfactory and support given. . Patient navigator Evonne Lazo is all ready meet the patient and is helping him with needs . Patient and caregiver received teaching and review through out the stay here.  Thermometer also given to them

## 2018-07-12 NOTE — PLAN OF CARE
Problem: Musculor/Skeletal Functional Status  Intervention: Fall precautions  Patient and neighbor aware of fall precautions for here and at home -call light in reach while here     Goal: Absence of falls  Outcome: Met This Shift  No falls this admission    Problem: Intellectual/Education/Knowledge Deficit  Intervention: Verbal/written education provided  Discharge instruction sheets     Goal: Teaching initiated upon admission  Outcome: Met This Shift  Chemotherapy Teaching     What is Chemotherapy   Drug action [x]   Method of Administration [x]   Handouts given [x]     Side Effects  Nausea/vomiting [x]   Diarrhea [x]   Fatigue [x]   Signs / Symptoms of infection [x]   Neutropenia [x]   Thrombocytopenia [x]   Alopecia [x]   neuropathy [x]   Gibson diet &  the importance of fluids [x]       Micellaneous  Importance of nutrition [x]   Importance of oral hygiene [x]   When to call the MD [x]   Monitoring labs [x]   Use of supportive services [x]     Explanation of Drug Regimen / Frequency  taxotere and prednisone every three weeks     Comments  Verbalized understanding to drug,action,side effects and when to call MD     Goal: Written Disposition Instruction form completed  Outcome: Met This Shift  Discharge instructions given and reviewed with patient. All questions answered. Patient verbalized understanding    Problem: Discharge Planning  Intervention: Interaction with patient/family and care team  Patient and neighbor denies any needs or concerns   Intervention: Discharge to appropriate level of care  Discharge home with neighbor assisting in care as needed and will be checking up on him    Goal: Knowledge of discharge instructions  Knowledge of discharge instructions    Outcome: Met This Shift  Patient and neighbor  able to teach back follow up appointments and when to call the doctor.  Patient offers no questions at this time    Problem: Infection - Central Venous Catheter-Associated Bloodstream Infection:  Intervention: Central line needs assessment  Patient currently starting chemotherapy   Intervention: Infection risk assessment  Patient aware that is of increased risk for infection due to receiving chemotherapy and having a central venous catheter. Patient aware of signs and symptoms of infection and when to call the doctor    Goal: Will show no infection signs and symptoms  Will show no infection signs and symptoms  Outcome: Met This Shift  No signs of infection noted at mediport site     Comments: Care plan reviewed with patient and neighbor . Patient and neighbor verbalize understanding of the plan of care and contribute to goal setting.

## 2018-07-12 NOTE — TELEPHONE ENCOUNTER
Called to check on pt post op. Pt states he slept pretty good.  Pt has chemo teaching today at [address] [place] [address].

## 2018-07-12 NOTE — PROGRESS NOTES
Ramon8 Shavon Null requires a bedside commode due to being confined to one level of the home, and is physically incapable of utilizing regular toilet facilities. Current body weight is Weight: 146 lb 9.6 oz (66.5 kg).

## 2018-07-12 NOTE — PROGRESS NOTES
irregular ill-defined increased activity in the skull, right frontal region.       Mottled increased activity on most of the ribs. Similar finding can be seen with the vertebral bodies particularly from the anterior posterior projection this is well seen in the lower lumbar bodies. Increased activity in the proximal femora bilaterally    in addition to more focal areas of increased activity in the femoral diaphyses findings are consistent with widespread prostatic disease which can be seen on the patient's recent CT scan abdomen pelvis. While in the hospital the patient received [place] Veronika and was started on Casodex. In November 2017 he underwent cystoscopy, no bladder abnormality was found. Sometime in December 2017, January 2018, he started Carmelo Slot injection. From January till May 2018 he continued  Firmagon injections. However his PSA was increasing very quickly. His PSA doubled between March 9, 2018 and April 25, 2018. Most recent PSA on May 21, 2018 was 574. Sometime in May the patient was started on XTANDI. On 5/27/18 pt presented to [facility] ER for weakness and pain. Interim history on July 12, 2018: The patient reports that his diffuse bone pain is improved with methadone. He is still constipated didn't have bowel movement for almost 1 week. He also developed nausea. On July 7, 2018 he went to the emergency room with nausea/constipation. X-ray of the abdomen showed moderate amount of stool in the colon. He continues having poor appetite and his weight continues to decrease. No fever/chills. His neibeour  Meliton Baer has been buying him Ensure to get his weight up. The patient underwent successful and uncomplicated port placement yesterday by mouth. He presents to the medical oncology clinic to start therapy palliative chemotherapy with Taxotere.     HPI   Past Medical History:   Diagnosis Date    Backache     Cancer [facility]) 2011    prostate    Goiter     Hyperlipidemia     Hypertension TABS per tab Take 1 tablet by mouth 2 times daily 180 tablet 3    acetaminophen (TYLENOL) 325 MG tablet Take 2 tablets by mouth every 4 hours as needed for Pain 120 tablet 3    Multiple Vitamins-Minerals (MENS MULTI VITAMIN & MINERAL PO) Take by mouth      levothyroxine (SYNTHROID) 100 MCG tablet Take 125 mcg by mouth Daily       aspirin 325 MG tablet Take 325 mg by mouth every 48 hours       pravastatin (PRAVACHOL) 40 MG tablet Take 40 mg by mouth daily.  Misc. Devices (COMMODE BEDSIDE) MISC 1 Units by Does not apply route daily 1 each 0    dexamethasone (DECADRON) 4 MG tablet Take 2 tabs (8 mg) by mouth 2 times daily for three days beginning 1 day prior to scheduled DOCEtaxel administration. 12 tablet 2     No current facility-administered medications for this visit.       Facility-Administered Medications Ordered in Other Visits   Medication Dose Route Frequency Provider Last Rate Last Dose    sodium chloride flush 0.9 % injection 20 mL  20 mL Intercatheter PRN Rohan Sherman MD   10 mL at 07/12/18 1111    heparin flush 100 UNIT/ML injection 500 Units  500 Units Intercatheter PRN Rohan Sherman MD        heparin flush 100 UNIT/ML injection 500 Units  500 Units Intercatheter PRN Rohan Sherman MD   500 Units at 07/12/18 1501    sodium chloride flush 0.9 % injection 10 mL  10 mL Intravenous PRN Rohan Sherman MD   10 mL at 07/12/18 1501      No Known Allergies   Health Maintenance   Topic Date Due    Hepatitis C screen  1950    DTaP/Tdap/Td vaccine (1 - Tdap) 10/25/1970    Lipid screen  10/25/1991    Shingles Vaccine (1 of 2 - 2 Dose Series) 10/25/2001    Pneumococcal low/med risk (1 of 2 - PCV13) 10/25/2016    Flu vaccine (1) 09/01/2018    TSH testing  11/11/2018    Creatinine monitoring  07/07/2019    Potassium monitoring  07/11/2019    Colon cancer screen colonoscopy  11/14/2027    AAA screen  Completed        Subjective:   Review of Systems   Constitutional: Positive for appetite change, throughout the visualized skeleton consistent with diffuse osseous metastatic disease and stable compared to the prior examination. 2. Probable degenerative arthropathic changes as detailed above       Assessment:        Diagnosis Orders   1. Prostate cancer (HCC)  Ferritin    Iron    Iron Binding Capacity    DISCONTINUED: 0.9 % sodium chloride infusion    DISCONTINUED: dexamethasone (DECADRON) 12 mg in sodium chloride 0.9 % IVPB    DISCONTINUED: DOCEtaxel (TAXOTERE) 137 mg in sodium chloride 0.9 % 250 mL chemo IVPB    DISCONTINUED: sodium chloride flush 0.9 % injection 10 mL    DISCONTINUED: heparin flush 100 UNIT/ML injection 500 Units   2. Cancer, metastatic to bone [facility])  DISCONTINUED: potassium chloride 20 mEq in sodium chloride 0.9 % 500 mL infusion   3. [facility] Therapy     4. Microcytic anemia  Ferritin    Iron    Iron Binding Capacity    Iron   5. Nausea     6. Drug-induced constipation     7. Encounter for chemotherapy management          Plan: 1. Metastatic prostate cancer. The patient has progressed on two lines ADT with Casodex and recently XTANDI. His PSA almost doubles every month. The patient didn't have any imaging studies since January 2018. Therefore he had CT of the chest abdomen pelvis and bone scan, they confirmed extensive skeletal involvement and retroperitoneal lymphadenopathy. Treatment for patients with disseminated prostate cancer that has progressed on androgen deprivation therapy and abiraterone is Taxotere chemotherapy in combination with prednisone that has been shown to significantly prolong overall survival. The  potential toxicities associated with Taxotere (myelosuppression, febrile infections, neuropathy) might be difficult to monitor in the patient who was noncompliant in the past.  Currently he has very good social support from his neighbor Mrs. Henderson. Therefore we discussed schedule and side effects from chemotherapy today.    In preparation for chemotherapy the patient underwent successful and uncomplicated port placement since his peripheral veins are poor. In combination with Taxotere the patient will be taking prednisone 5 mg by mouth twice a day. He will receive Xgeva and Lupron today. 2.  Poorly controlled cancer related pain. The patient reports that methadone 5 mg every 6 hours  Is controlling his pain much better. [facility] prescription monitoring report for this patient was reviewed today. No signs of potential drug abuse or divergent fashion identified. 3.  Microcytic anemia. The patient will have iron studies next visit. 4.  Nausea. Prescription given for Zofran  5. Constipation. The patient received prescription for Dulcolax suppositories, he was instructed to continue Colace and Miralax. Return in about 1 week (around 7/19/2018). Orders Placed:   Orders Placed This Encounter   Procedures    Ferritin     Standing Status:   Future     Standing Expiration Date:   7/12/2019    Iron     Standing Status:   Future     Standing Expiration Date:   7/12/2019     Order Specific Question:   Is Patient Fasting? Answer:   no     Order Specific Question:   No of Hours? Answer:   no    Iron Binding Capacity     Standing Status:   Future     Standing Expiration Date:   7/12/2019    Iron     Standing Status:   Future     Standing Expiration Date:   7/12/2019     Order Specific Question:   Is Patient Fasting? Answer:   No     Order Specific Question:   No of Hours?      Answer:   No        Medications Prescribed:   Orders Placed This Encounter   Medications    ondansetron (ZOFRAN) 4 MG tablet     Sig: Take 1 tablet by mouth every 8 hours as needed for Nausea or Vomiting     Dispense:  15 tablet     Refill:  0    predniSONE (DELTASONE) 5 MG tablet     Sig: Take 1 tablet by mouth 2 times daily     Dispense:  60 tablet     Refill:  2    bisacodyl (BISAC-EVAC) 10 MG suppository     Sig: Place 1 suppository rectally as needed for Constipation

## 2018-07-12 NOTE — PROGRESS NOTES
Patient and caregiver taylor both given chemotherapy instruction on Taxotere and the need to take prednisone twice a day with food. Copies of drug information given to both as well. When asked to patient if he understands that he is getting chemotherapy and that these medications have side effects: he said yes.

## 2018-07-12 NOTE — PROGRESS NOTES
Patient assessed for the following post chemotherapy:    Dizziness   No  Lightheadedness  No      Acute nausea/vomiting No  Headache   No  Chest pain/pressure  No  Rash/itching   No  Shortness of breath  No    Patient kept for 20 minutes observation post infusion chemotherapy. Patient tolerated chemotherapy treatment with taxoltere  without any complications. Last vital signs:   /76   Pulse 80   Temp 98.2 °F (36.8 °C) (Oral)   Resp 16   Wt 146 lb 9.6 oz (66.5 kg) Comment: with boots on   SpO2 99%   BMI 18.82 kg/m²         Patient instructed if experience any of the above symptoms following today's infusion,he is to notify MD immediately or go to the emergency department. Discharge instructions given to patient. Verbalizes understanding. Ambulated off unit with caregiver taylor who had his belongings.

## 2018-07-12 NOTE — PROGRESS NOTES
Patient sat up in chair and started vomitting. Patient states this is what I do at home and things just dont mix in my belly - I am glad you saw this so you can tell taylor that this is what happens at home when I try to drink or eat to much. Patient vomitted 200 ml of light green liquid, Dr Suad Varela aware and carmen ordered.

## 2018-07-12 NOTE — PROGRESS NOTES
Patient able to keep sips of water down now and states that his stomach doesn't feel upset. Patient is agreeable to home. caregiver taylor verbalized under standing that if patient continues to vomit and or not able to eat or drink at least 48 ounces of water she is to call the office immediately.  Patient and care giver taylor both verbalized understanding

## 2018-07-12 NOTE — TELEPHONE ENCOUNTER
[address] is requesting a refill of the following medication(s); Requested Prescriptions     Pending Prescriptions Disp Refills    Misc.  Devices (COMMODE BEDSIDE) MISC 1 each 0     Si Units by Does not apply route daily       Last filled;

## 2018-07-13 NOTE — TELEPHONE ENCOUNTER
Navigation:     Referral made to Palliative Care to assist with sx management. Referral form emailed. Karthikeyan phoned Nandoyane Clark, to update on the referral to palliative care for assistance with sx management. Karthikeyan inquiry about Medicaid; pt receptive to filing an application, however, Elder Frost reports pt sister shared that pt has a pension in [place] that he has not drawn from. Karthikeyan informs Elder Frost that there will be a need to know the value of the pension before he can file a Medicaid application. Elder Frost will outreach to pt's sister for the info. Karthikeyan remains available to assist as needed.

## 2018-07-13 NOTE — TELEPHONE ENCOUNTER
Navigation:     Karthikeyan received phone call from maru Richards, with inquiry about bedside commode. Karthikeyan updated Winnie Carrasco that the RX for commode was e-scribed to [facility] on [address] will pick commode up today for pt use. Karthikeyan inquiry about pt tolerance to chemotherapy yest;  Winnie Carrasco reports pt experience 1 emesis during tx, received antiemetic, & has had no further vomiting. Astrid further reports that pt's family (sister, son & grandchildren) are coming from [place] today for a visit. Pt returns to [place] [address] office on Thurs, 7/19/18.

## 2018-07-15 NOTE — ED PROVIDER NOTES
[facility] EMERGENCY DEPT      CHIEF COMPLAINT       Chief Complaint   Patient presents with    Other     Pain where port was placed       Nurses Notes reviewed and I agree except as noted in the HPI. HISTORY OF PRESENT ILLNESS    Pettibone ROSHAN Shook. is a 78 y.o. male who presents for evaluation of pain to his port site. The patient states his port was placed by Dr. Jeronimo Lewis 3 days ago, on 7/11/18. He states he was reaching up to screw in a light bulb with his right arm when the pain began earlier today. 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 He denies any shortness of breath, fever, or chills. No further complaints at the time of the initial encounter. Onset: Today. Duration: Several hours. Timing: Constant. Location of Pain: Right chest wall. Intesity/severity: Moderate. Modifying Factors: Worse with movement. REVIEW OF SYSTEMS      Review of Systems   Constitutional: Negative for fever, chills, diaphoresis and fatigue. HENT: Negative for congestion, drooling, facial swelling and sore throat. Eyes: Negative for photophobia, pain and discharge. Respiratory: Negative for cough, shortness of breath, wheezing and stridor. Cardiovascular: Negative for chest pain, palpitations and leg swelling. Gastrointestinal: Negative for abdominal pain, blood in stool and abdominal distention. Endocrine: Negative for cold intolerance, heat intolerance, polydipsia and polyuria. Genitourinary: Negative for dysuria, urgency, hematuria and difficulty urinating. Musculoskeletal: Right chest wall pain. Negative for gait problem, neck pain and neck stiffness. Allergic/Immunologic: Negative for environmental allergies, food allergies and immunocompromised state. Skin; No rash, No itching  Neurological: Negative for seizures, weakness and numbness. Hematological: Negative for adenopathy. Does not bruise/bleed easily.    Psychiatric/Behavioral: Negative for hallucinations, confusion and agitation. PAST MEDICAL HISTORY    has a past medical history of Backache; Cancer ([address] Goiter; Hyperlipidemia; Hypertension; Hypothyroidism; Incontinence of urine; and Osteoarthritis. SURGICAL HISTORY      has a past surgical history that includes Prostate surgery (2011); Thyroid surgery (2011); Colonoscopy; pr esophagogastroduodenoscopy transoral diagnostic (N/A, 11/14/2017); pr colsc flx w/rmvl of tumor polyp lesion snare tq (N/A, 11/14/2017); and pr insj tunneled ctr vad w/subq port age 12 yr/> (N/A, 7/11/2018). CURRENT MEDICATIONS       Previous Medications    ACETAMINOPHEN (TYLENOL) 325 MG TABLET    Take 2 tablets by mouth every 4 hours as needed for Pain    ASPIRIN 325 MG TABLET    Take 325 mg by mouth every 48 hours     ATENOLOL (TENORMIN) 25 MG TABLET    Take 25 mg by mouth daily    BISACODYL (BISAC-EVAC) 10 MG SUPPOSITORY    Place 1 suppository rectally as needed for Constipation    CALCIUM CARBONATE-VITAMIN D (CALCIUM 600 + D) 600-400 MG-UNIT TABS PER TAB    Take 1 tablet by mouth 2 times daily    CHOLECALCIFEROL (VITAMIN D-3) 1000 UNITS CAPS    Take 1 capsule by mouth daily    DEXAMETHASONE (DECADRON) 4 MG TABLET    Take 2 tabs (8 mg) by mouth 2 times daily for three days beginning 1 day prior to scheduled DOCEtaxel administration. LEVOTHYROXINE (SYNTHROID) 100 MCG TABLET    Take 125 mcg by mouth Daily     METHADONE (DOLOPHINE) 5 MG TABLET    Take 1 tablet by mouth every 6 hours as needed for Pain for up to 30 days. Emir Alonzo MISC. DEVICES (COMMODE BEDSIDE) MISC    1 Units by Does not apply route daily    MULTIPLE VITAMINS-MINERALS (MENS MULTI VITAMIN & MINERAL PO)    Take by mouth    ONDANSETRON (ZOFRAN) 4 MG TABLET    Take 1 tablet by mouth every 8 hours as needed for Nausea or Vomiting    POLYETHYLENE GLYCOL (MIRALAX) PACKET    Take 17 g by mouth daily as needed for Constipation    PRAVASTATIN (PRAVACHOL) 40 MG TABLET    Take 40 mg by mouth daily.     PREDNISONE CRITICAL CARE:   None    CONSULTS:  None    PROCEDURES:  None    FINAL IMPRESSION      1. Chest wall pain          DISPOSITION/PLAN   Decision To Discharge    PATIENT REFERRED TO:  Li Ferguson, [address]  [address] [place] [id number] [phone]      As needed, RE-CHECK AND FURTHER TESTING AS NEEDED      DISCHARGE MEDICATIONS:  New Prescriptions    No medications on file       (Please note that portions of this note were completed with a voice recognition program.  Efforts were made to edit the dictations but occasionally words are mis-transcribed.)    Scribe:  Pita Conley 7/14/18 10:19 PM Scribing for and in the presence of Helga Stanley DO. Signed by: Kathy Reyes, 7/14/18 11:40 PM    Provider:  I personally performed the services described in the documentation, reviewed and edited the documentation which was dictated to the scribe in my presence, and it accurately records my words and actions.     Helga Stanley DO 07/14/18 11:40 PM     Betty Nazario [address] KEVON Null DO  07/15/18 5819

## 2018-07-15 NOTE — ED NOTES
Pt comes to the ED with c/o pain of his right shoulder after having a port placed for chemo tx. Port was placed 7/11/18. Pt states that he was reaching up with his right arm to screw in a light bulb. Pt also scratched his port site earlier today. Pt's port looks clean and dry. No signs of infection noted.       Yann Plaza RN  07/14/18 [id number]

## 2018-07-17 NOTE — TELEPHONE ENCOUNTER
Navigation:     Karthikeyan received phone call from [address], updating Karthikeyan that pt was seen in ER over the weekend d/t \"chest pain complaints\"; however, \"heart issue was ruled out\", & \"deemed the pain was related to pt's new Mediport placement. Pt discharged home. Karthikeyan voiced appreciation of info communicated. Karthikeyan following for pt needs.

## 2018-07-17 NOTE — TELEPHONE ENCOUNTER
Spoke with Dr Narayan Welch and patient is coming in tomorrow and patient is still supposed to continue with the nausea medicine.

## 2018-07-18 NOTE — PLAN OF CARE
Problem: Intellectual/Education/Knowledge Deficit  Intervention: Verbal/written education provided  Patient educated blood product transfusion protocol:    Patient receiving 2 units blood:      - Blood product transfusion information sheet given: questions answered and consent signed  - Take vital signs/ monitor lungs sound prior to transfusion  - Monitor patient for 15 minutes after transfusion started  - Take vital signs / monitor lungs sound in 15 minutes and post transfusion  - Assess IV site   - Monitor patient closely for potential transfusion reaction    Call MD if develop complications once discharged. Lupron and xgeva injections reviewed, patient verbalizes understanding of medication being administered and potential side effects. Goal: Teaching initiated upon admission  Outcome: Met This Shift  Patient verbalize understanding to  verbal and written information given  on blood transfusion,procedure ,and possible reaction. Instructed to call MD if develop any complications once discharged. Patient verbalizes understanding to verbal information given on lupron and xgeva injection,action and possible side effects. Aware to call MD if develop complications. Problem: Discharge Planning  Intervention: Interaction with patient/family and care team  Discuss understanding of discharge instructions,follow-up appointments, and when to call the physician. Goal: Knowledge of discharge instructions  Knowledge of discharge instructions     Outcome: Met This Shift  Verbalized understanding of discharge instructions, follow-up appointments, and when to call the physician. Problem: Infection - Central Venous Catheter-Associated Bloodstream Infection:  Intervention: Infection risk assessment  Instructed to monitor for signs/symptoms of infection at [address] [facility] and call MD if problems develop.     Goal: Will show no infection signs and symptoms  Will show no infection signs and symptoms   Outcome: Met This Shift  Mediport site with no redness or warmth. Skin over port site intact with no signs of breakdown noted. Patient verbalizes signs/symptoms of port infection and when to notify the physician. Problem: Musculor/Skeletal Functional Status  Intervention: Fall precautions  Verbalized understanding of fall prevention to ask for assistance with ambulation. Call light within reach. Goal: Absence of falls  No falls occurred with visit today. Comments: Care plan reviewed with patient and care giver. Patient and care giver verbalize understanding of the plan of care and contribute to goal setting.

## 2018-07-18 NOTE — PROGRESS NOTES
Ambulated off unit to examination room for appointment with Dr. Felecia Gowers escorted by Thee Alejo.

## 2018-07-18 NOTE — PROGRESS NOTES
Patient assessed for the following post blood transfusions and hydration fluids:    Dizziness   No  Lightheadedness  No      Acute nausea/vomiting No  Headache   No  Chest pain/pressure  No  Rash/itching   No  Shortness of breath  No    Patient kept for 20 minutes observation post blood transfusions. Patient tolerated blood transfusions hydration fluids with K, lupron injection, xgeva injection without any complications. Last vital signs:   /63   Pulse 94   Temp 98.3 °F (36.8 °C) (Oral)   Resp 18   Ht 6' 2\" (1.88 m)   Wt 140 lb 12.8 oz (63.9 kg)   SpO2 99%   BMI 18.08 kg/m²         Patient instructed if experience any of the above symptoms following today's infusion,he/she is to notify MD immediately or go to the emergency department. Discharge instructions given to patient. Verbalizes understanding. Transported off unit in wheelchair with belongings accompanied by neighbor.

## 2018-07-18 NOTE — PATIENT INSTRUCTIONS
1.  2 units of PRBC today  2.  20 mEq of potassium in 500 MLS of normal saline today  3. Lupron and Xgeva injection today  4. RTC in one week to see me in 4 labs  5.   RTC labs: CBC, BMP,

## 2018-07-18 NOTE — PROGRESS NOTES
MULTI VITAMIN & MINERAL PO) Take by mouth      levothyroxine (SYNTHROID) 100 MCG tablet Take 125 mcg by mouth Daily       aspirin 325 MG tablet Take 325 mg by mouth every 48 hours       pravastatin (PRAVACHOL) 40 MG tablet Take 40 mg by mouth daily. No current facility-administered medications for this visit. Facility-Administered Medications Ordered in Other Visits   Medication Dose Route Frequency Provider Last Rate Last Dose    sodium chloride flush 0.9 % injection 10 mL  10 mL Intercatheter SREEKANTH Simpson MD   10 mL at 07/18/18 1700    sodium chloride flush 0.9 % injection 20 mL  20 mL Intercatheter PRJESUS Simpson MD        heparin flush 100 UNIT/ML injection 500 Units  500 Units Intercatheter SREEKANTH Simpson MD   500 Units at 07/18/18 1700      No Known Allergies   Health Maintenance   Topic Date Due    Hepatitis C screen  1950    DTaP/Tdap/Td vaccine (1 - Tdap) 10/25/1970    Lipid screen  10/25/1991    Shingles Vaccine (1 of 2 - 2 Dose Series) 10/25/2001    Pneumococcal low/med risk (1 of 2 - PCV13) 10/25/2016    Flu vaccine (1) 09/01/2018    TSH testing  11/11/2018    Creatinine monitoring  07/07/2019    Potassium monitoring  07/11/2019    Colon cancer screen colonoscopy  11/14/2027    AAA screen  Completed        Subjective:   Review of Systems   Constitutional: Positive for appetite change, fatigue and unexpected weight change. Negative for activity change and fever. HENT: Negative for congestion, dental problem, facial swelling, hearing loss, mouth sores, nosebleeds, sore throat, tinnitus and trouble swallowing. Eyes: Negative for discharge and visual disturbance. Respiratory: Negative for cough, chest tightness, shortness of breath and wheezing. Cardiovascular: Negative for chest pain, palpitations and leg swelling. Gastrointestinal: Positive for constipation and nausea.  Negative for abdominal distention, abdominal pain, blood in stool, diarrhea, rectal pain and vomiting. Endocrine: Negative for cold intolerance, polydipsia and polyuria. Genitourinary: Positive for difficulty urinating (incontinence). Negative for decreased urine volume, dysuria, flank pain, hematuria and urgency. Musculoskeletal: Positive for back pain. Negative for arthralgias, gait problem, joint swelling, myalgias and neck stiffness. Skin: Negative for color change, rash and wound. Neurological: Positive for weakness. Negative for dizziness, tremors, seizures, speech difficulty, light-headedness, numbness and headaches. Hematological: Negative for adenopathy. Does not bruise/bleed easily. Psychiatric/Behavioral: Negative for confusion and sleep disturbance. The patient is not nervous/anxious. Objective:   Physical Exam   Constitutional: He is oriented to person, place, and time. He appears well-developed and well-nourished. No distress. HENT:   Head: Normocephalic. Mouth/Throat: Oropharynx is clear and moist. No oropharyngeal exudate. Eyes: EOM are normal. Pupils are equal, round, and reactive to light. Right eye exhibits no discharge. Left eye exhibits no discharge. No scleral icterus. Neck: Normal range of motion. Neck supple. No JVD present. No tracheal deviation present. No thyromegaly present. Cardiovascular: Normal rate and normal heart sounds. Exam reveals no gallop and no friction rub. No murmur heard. Pulmonary/Chest: Effort normal and breath sounds normal. No stridor. No respiratory distress. He has no wheezes. He has no rales. He exhibits no tenderness. Abdominal: Soft. Bowel sounds are normal. He exhibits no distension and no mass. There is no tenderness. There is no rebound. Musculoskeletal: Normal range of motion. He exhibits no edema. Good range of motion in all four extremities. Lymphadenopathy:     He has no cervical adenopathy. Neurological: He is alert and oriented to person, place, and time. He has normal reflexes.  No cranial nerve studies since January 2018. Therefore he had CT of the chest abdomen pelvis and bone scan, they confirmed extensive skeletal involvement and retroperitoneal lymphadenopathy. Treatment for patients with disseminated prostate cancer that has progressed on androgen deprivation therapy and abiraterone is Taxotere chemotherapy in combination with prednisone that has been shown to significantly prolong overall survival. The  potential toxicities associated with Taxotere (myelosuppression, febrile infections, neuropathy) might be difficult to monitor in the patient who was noncompliant in the past.  Currently he has very good social support from his neighbor Mrs. Henderson. 2.  Palliative chemotherapy with Taxotere. First cycle given on July 12, 2018. He continues a prednisone twice a day. He will receive Xgeva and Lupron today. 3.  Cancer related pain. The patient reports that methadone 5 mg every 6 hours  Is controlling his pain much better. [facility]ARS prescription monitoring report for this patient was reviewed today. No signs of potential drug abuse or divergent fashion identified. 3.  Microcytic anemia. His hemoglobin dropped to 6.9 today he will receive 2 units of PRBCs today. 4.  Nausea. The patient toook Zofran today and so far he is able to keep food and liquids. 5.  Hypokalemia. The patient will receive 20 mEq of potassium chloride in 500 months of normal saline today         Return in about 1 week (around 7/25/2018).      Orders Placed:   Orders Placed This Encounter   Procedures    POC PANEL BMP W/IOCA     Standing Status:   Future     Number of Occurrences:   1     Standing Expiration Date:   7/19/2019    CBC Auto Differential     Standing Status:   Future     Number of Occurrences:   1     Standing Expiration Date:   7/19/2019    Type And Screen     Standing Status:   Future     Number of Occurrences:   1     Standing Expiration Date:   7/18/2019        Medications Prescribed:   No orders of the defined types were placed in this encounter.

## 2018-07-19 NOTE — ED NOTES
Urine specimen obtained. Port accessed. Pt hooked up to telemetry. Call light within reach.      Zoie Quiñonez RN  07/19/18 6700

## 2018-07-19 NOTE — ED PROVIDER NOTES
[facility]  eMERGENCY dEPARTMENT eNCOUnter          [address] [facility]       Chief Complaint   Patient presents with    Toe Pain     Feet Pain       Nurses Notes reviewed and I agree except as noted in the HPI. HISTORY OF PRESENT ILLNESS    Darrington ROSHAN Woodson. is a 78 y.o. male who presents to the Emergency Department via EMS transport for the evaluation of bilateral foot pain. Patient reports a history of prostate cancer which has metastasized to his bone, hypertension, and hyperlipidemia. He states that his dog jumped into his bed this morning, and that he has been experiencing bilateral foot pain since. He rates his current pain at a 9/10 in severity. Patient denies experiencing any chest pain, shortness of breath, abdominal pain, fever, chills, weakness, numbness, or tingling. Patient denies further complaints at initial encounter. The HPI was provided by the patient. REVIEW OF SYSTEMS     Review of Systems   Constitutional: Negative for appetite change, chills, diaphoresis, fatigue and fever. HENT: Negative for congestion, rhinorrhea and sore throat. Respiratory: Negative for cough, chest tightness, shortness of breath and wheezing. Cardiovascular: Negative for chest pain, palpitations and leg swelling. Gastrointestinal: Negative for abdominal pain, blood in stool, constipation, diarrhea, nausea and vomiting. Genitourinary: Negative for difficulty urinating, dysuria and hematuria. Musculoskeletal: Positive for arthralgias (Bilateral foot pain). Negative for back pain, joint swelling, neck pain and neck stiffness. Skin: Negative for pallor and rash. Neurological: Negative for dizziness, syncope, weakness, light-headedness, numbness and headaches. Hematological: Negative for adenopathy. Psychiatric/Behavioral: Negative for confusion and suicidal ideas. The patient is not nervous/anxious.         PAST MEDICAL HISTORY    has a past medical history of Backache; Cancer Musculoskeletal: Normal range of motion. Right foot: Normal. There is normal range of motion, no tenderness, no bony tenderness, no swelling, normal capillary refill, no crepitus and no deformity. Left foot: Normal. There is normal range of motion, no tenderness, no bony tenderness, no swelling, normal capillary refill, no crepitus and no deformity. Neurological: He is alert and oriented to person, place, and time. He displays no atrophy and no tremor. Skin: Skin is warm and dry. No rash noted. Psychiatric: He has a normal mood and affect. His speech is normal and behavior is normal.   Nursing note and vitals reviewed. DIAGNOSTIC RESULTS     EKG: All EKG's are interpreted by the Emergency Department Physician who either signs or Co-signs this chart in the absence of a cardiologist.  EKG interpreted by Kristie Zacarias MD:    [place]. Rate: 107 bpm  NE interval: 192 ms  QRS duration: 94 ms  QTc: 499 ms  P-R-T axes: 99, 23, 68  Sinus tachycardia  T wave abnormality, consider inferolateral ischemia  No STEMI  Compared to old EKG on November- which revealed new T wave inversions    RADIOLOGY: non-plain film images(s) such as CT, Ultrasound and MRI are read by the radiologist.    XR TOE LEFT (MIN 2 VIEWS)   Final Result   Mild degenerative changes without acute fracture or dislocation. **This report has been created using voice recognition software. It may contain minor errors which are inherent in voice recognition technology. **      Final report electronically signed by Dr. Buddy Mckeon on 7/19/2018 2:12 PM      XR TOE RIGHT (MIN 2 VIEWS)   Final Result   Mild degenerative changes without acute fracture or dislocation. **This report has been created using voice recognition software. It may contain minor errors which are inherent in voice recognition technology. **      Final report electronically signed by Dr. Buddy Mckeon on 7/19/2018 2:10 PM      XR CHEST 1 VW patient was seen and evaluated. MDM:  Clinically patient did not look like in gout but patient's uric acid level was elevated. Patient already is on allopurinol which he was advised to continue. Patient also was advised to drink more water. Patient did have urinary tract infection for which she is going to be treated. Since white count is in the lower side, but that is chronic. Patient's x-ray of the toes was negative for any fracture or dislocation    CRITICAL CARE:   None     CONSULTS:      PROCEDURES:  None     FINAL IMPRESSION      1. Pain in toes of both feet    2. Hyperuricemia    3. Urinary tract infection without hematuria, site unspecified          DISPOSITION/PLAN   Patient was discharged in stable condition    PATIENT REFERRED TO:  Socorro De La Rosa, [address] [place] [address] [address] [address]  [phone]    Go to   If symptoms worsen    [facility] EMERGENCY DEPT  [address]  [phone]  Go to   If symptoms worsen      DISCHARGE MEDICATIONS:  New Prescriptions    SULFAMETHOXAZOLE-TRIMETHOPRIM (BACTRIM DS) 800-160 MG PER TABLET    Take 1 tablet by mouth 2 times daily for 7 days       (Please note that portions of this note were completed with a voice recognition program.  Efforts were made to edit the dictations but occasionally words are mis-transcribed.)    Scribe:  Ana Plunkett 7/19/18 1:07 PM Scribing for and in the presence of Ari Ferguson MD.    Signed by: Kathy Kelley, 07/19/18 5:07 PM    Provider:  I personally performed the services described in the documentation, reviewed and edited the documentation which was dictated to the scribe in my presence, and it accurately records my words and actions.     Ari Ferguson MD 7/19/18 5:07 PM       Ari Ferguson MD  07/19/18 2003

## 2018-07-20 PROBLEM — R11.2 NAUSEA & VOMITING: Status: ACTIVE | Noted: 2018-01-01

## 2018-07-20 PROBLEM — D70.9 NEUTROPENIA (HCC): Status: ACTIVE | Noted: 2018-01-01

## 2018-07-20 PROBLEM — D61.818 PANCYTOPENIA (HCC): Status: ACTIVE | Noted: 2018-01-01

## 2018-07-20 NOTE — TELEPHONE ENCOUNTER
Pati called and stated that when she checked on [place] this morning he stated that he has been throwing up most of the night and early morning. Patient was given Zofran and threw that up. Please advise and thanks.

## 2018-07-20 NOTE — ED NOTES
Patient presents to the emergency dept with report of vomiting.  Patient is a cancer patient and his neighbor who helps him with his care and his medication spoke to his doctor and it was recommended that he come to the emergency dept for admission      Jonathan Orlando RN  07/20/18 5431

## 2018-07-20 NOTE — ED PROVIDER NOTES
[facility]  eMERGENCY dEPARTMENT eNCOUnter          CHIEF COMPLAINT       Chief Complaint   Patient presents with    Dehydration    Cancer       Nurses Notes reviewed and I agree except as noted in the HPI. HISTORY OF PRESENT ILLNESS    [place] ROSHAN Hope. is a 78 y.o. male who presents to the Emergency Department accompanied by a caregiver for the evaluation of intractable vomiting. The patient's caregiver acting as the primary historian states that the patient was evaluated in the ED yesterday after a dog fell on his foot. He was discharged following a secondary diagnosis of a UTI with a prescription for bactrim. The patient then started vomiting intractably. The patient's caregiver called the patient's oncologist, Dr. Maksim Molina, who stated that the patient should come to the ED for admission for IV antibiotics. The patient has a history of prostate cancer with metastasis to the bones and the forehead. The patient denies any abdominal pain, or any other signs or symptoms at this time. The patient has a prescription for methadone which is for 1 dose every 6 hours. The patient's caregiver reports that within the past week his WBC has been low. The HPI was provided by the patient. REVIEW OF SYSTEMS     Review of Systems   Constitutional: Negative for activity change, appetite change, chills, fatigue and fever. HENT: Negative for congestion, ear pain, nosebleeds, rhinorrhea and sore throat. Eyes: Negative for pain, discharge, redness and visual disturbance. Respiratory: Negative for cough, chest tightness, shortness of breath and wheezing. Cardiovascular: Negative for chest pain, palpitations and leg swelling. Gastrointestinal: Positive for nausea and vomiting. Negative for abdominal pain, blood in stool and diarrhea. Endocrine: Negative for polydipsia and polyuria.    Genitourinary: Negative for decreased urine volume, difficulty urinating, dysuria, enuresis, flank pain, 0.9 (*)     Monocytes # 0.3 (*)     All other components within normal limits   CBC WITH AUTO DIFFERENTIAL - Abnormal; Notable for the following:     WBC 2.0 (*)     RBC 2.54 (*)     Hemoglobin 7.3 (*)     Hematocrit 21.8 (*)     RDW-CV 18.7 (*)     RDW-SD 58.4 (*)     MPV 9.2 (*)     Segs Absolute 0.5 (*)     Monocytes # 0.2 (*)     All other components within normal limits   BASIC METABOLIC PANEL W/ REFLEX TO MG FOR LOW K  - Abnormal; Notable for the following:     Potassium reflex Magnesium 3.3 (*)     Calcium 6.9 (*)     All other components within normal limits   GLOMERULAR FILTRATION RATE, ESTIMATED - Abnormal; Notable for the following:     Est, Glom Filt Rate 81 (*)     All other components within normal limits   CULTURE BLOOD #1    Narrative:     Source: blood-Adult-suboptimal <5.5oz./set volume       Site: Peripheral Vein(single bottle)            Current Antibiotics: none   CULTURE BLOOD #2    Narrative:     Source: blood-Adult-suboptimal <5.5oz./set volume       Site: Peripheral Vein            Current Antibiotics: not stated   LACTATE, SEPSIS   LACTATE, SEPSIS   SCAN OF BLOOD SMEAR   ANION GAP   MAGNESIUM   POCT GLUCOSE       EMERGENCY DEPARTMENT COURSE:   Vitals:    Vitals:    07/20/18 1455 07/20/18 1617 07/20/18 2030 07/21/18 0415   BP: 115/73 118/75 109/68 103/65   Pulse: 85 73 84 74   Resp: 16 18 18 16   Temp:  98.6 °F (37 °C) 98.3 °F (36.8 °C) 97.9 °F (36.6 °C)   TempSrc:  Oral Oral Oral   SpO2: 98% 98% 100% 100%   Weight:  145 lb 9.6 oz (66 kg)     Height:  6' 2\" (1.88 m)         1:07 PM: The patient was seen and evaluated. MDM:  Dr. Meet Bowden:   none    CONSULTS:  Dr Kirk Heimlich Dr Marko Malady, Hospitalist for admission    PROCEDURES:  none     FINAL IMPRESSION      1. Neutropenic fever ([address]    2. Prostate cancer ([address]    3. Metastatic cancer ([address]    4.  Intractable vomiting without nausea, unspecified vomiting type          DISPOSITION/PLAN   admission    PATIENT REFERRED

## 2018-07-20 NOTE — H&P
SURGERY  2011    THYROID SURGERY  2011    Dr. Quintana Major       Medications Prior to Admission:      Prior to Admission medications    Medication Sig Start Date End Date Taking? Authorizing Provider   sulfamethoxazole-trimethoprim (BACTRIM DS) 800-160 MG per tablet Take 1 tablet by mouth 2 times daily for 7 days 7/19/18 7/26/18 Yes Spencer Hess MD   ondansetron (ZOFRAN) 4 MG tablet Take 1 tablet by mouth every 8 hours as needed for Nausea or Vomiting 7/12/18  Yes Jay Brooks MD   predniSONE (DELTASONE) 5 MG tablet Take 1 tablet by mouth 2 times daily 7/12/18 8/11/18 Yes Jay Brooks MD   bisacodyl (BISAC-EVAC) 10 MG suppository Place 1 suppository rectally as needed for Constipation 7/12/18 8/11/18 Yes Jay Brooks MD   Misc. Devices (COMMODE BEDSIDE) MISC 1 Units by Does not apply route daily 7/12/18  Yes Jay Brooks MD   dexamethasone (DECADRON) 4 MG tablet Take 2 tabs (8 mg) by mouth 2 times daily for three days beginning 1 day prior to scheduled DOCEtaxel administration. 7/12/18  Yes Jay Brooks MD   methadone (DOLOPHINE) 5 MG tablet Take 1 tablet by mouth every 6 hours as needed for Pain for up to 30 days. . 7/5/18 8/4/18 Yes Jay Brooks MD   vitamin B-6 (PYRIDOXINE) 100 MG tablet Take 100 mg by mouth daily   Yes Historical Provider, MD   atenolol (TENORMIN) 25 MG tablet Take 25 mg by mouth daily   Yes Historical Provider, MD   Cholecalciferol (VITAMIN D-3) 1000 units CAPS Take 1 capsule by mouth daily   Yes Historical Provider, MD   calcium carbonate-vitamin D (CALCIUM 600 + D) 600-400 MG-UNIT TABS per tab Take 1 tablet by mouth 2 times daily 12/19/17  Yes MILAD Patton - CNP   acetaminophen (TYLENOL) 325 MG tablet Take 2 tablets by mouth every 4 hours as needed for Pain 11/16/17  Yes Milagros Danielle MD   Multiple Vitamins-Minerals (MENS MULTI VITAMIN & MINERAL PO) Take by mouth   Yes Historical Provider, MD   levothyroxine (SYNTHROID) 100 MCG tablet Take 125 mcg by mouth Daily    Yes Historical non-focal.  Psychiatric:  Alert and oriented, thought content appropriate, normal insight  Capillary Refill: Brisk,< 3 seconds   Peripheral Pulses: +2 palpable, equal bilaterally       Labs:     Recent Labs      07/18/18   0905  07/19/18   1435   WBC  2.0*  1.0*   HGB  6.9*  10.2*   HCT  20.9*  30.1*   PLT  237  171     Recent Labs      07/18/18   0905  07/19/18   1435   NA  131*  135   K  3.0*  3.2*   CL   --   94*   CO2   --   24   BUN   --   14   CREATININE  1.1  0.8   CALCIUM   --   8.0*     Recent Labs      07/19/18   1435   AST  59*   ALT  <5*   BILITOT  1.0   ALKPHOS  300*     No results for input(s): INR in the last 72 hours. No results for input(s): Maryam Sara in the last 72 hours. Urinalysis:      Lab Results   Component Value Date    NITRU POSITIVE 07/19/2018    WBCUA 15-25 07/19/2018    BACTERIA MANY 07/19/2018    RBCUA 0-2 07/19/2018    BLOODU TRACE 07/19/2018    SPECGRAV 1.025 05/27/2018    GLUCOSEU NEGATIVE 07/19/2018       Intake & Output:  No intake/output data recorded. No intake/output data recorded. Radiology:         XR CHEST PORTABLE   Final Result   No acute findings               **This report has been created using voice recognition software. It may contain minor errors which are inherent in voice recognition technology. **      Final report electronically signed by Dr. Christiane Paez on 7/20/2018 1:21 PM               DVT prophylaxis: [] Lovenox                                 [] SCDs                                 [x] SQ Heparin                                 [] Encourage ambulation           [] Already on Anticoagulation    Code Status: Prior      PT/OT Eval Status: pending    Disposition:    [x] Home       [] TCU       [] Rehab       [] Psych       [] SNF       [] [place]       [] Other-    ASSESSMENT:    C/Jerome Madrigal [address] Problems    Diagnosis Date Noted    Nausea & vomiting [R11.2] 07/20/2018    Neutropenia ([facility] [D70.9] 07/20/2018    Pancytopenia ([facility]

## 2018-07-20 NOTE — ED NOTES
Patient resting in bed, friend at bedside, patient watching tv, no concerns voiced     Tammi Jacobo, ISSA  07/20/18 8292

## 2018-07-21 PROBLEM — E43 SEVERE MALNUTRITION (HCC): Status: ACTIVE | Noted: 2018-01-01

## 2018-07-21 NOTE — PROGRESS NOTES
assess  · Oral Nutrition Supplement (ONS) Orders:  (pt declines all nutrtional supplements. Per  pt he has tried them & does not like then)  · ONS intake: Refused  · Anthropometric Measures:  · Ht: 6' 2\" (188 cm)   · Current Body Wt: 145 lb 8.1 oz (66 kg) (7/20/18 no edema)  · Admission Body Wt: 145 lb 8.1 oz (66 kg) (7/20/18 no edema)  · Usual Body Wt: 151 lb 7.3 oz (68.7 kg) (6/21/18)  · % Weight Change: 3.9% weight loss,  1 month  · Ideal Body Wt: 190 lb (86.2 kg), % Ideal Body 76%  · BMI Classification: BMI 18.5 - 24.9 Normal Weight (18.7)  · Comparative Standards (Estimated Nutrition Needs):  · Estimated Daily Total Kcal: ~2310 kcals + ( 35 kcals/kg on admit wt of 66 kg)  · Estimated Daily Protein (g): ~92 grams ( 1.4 grams protein/kg on admit wt of 66 kg) . Monitor reanl status    Estimated Intake vs Estimated Needs: Intake Less Than Needs    Nutrition Risk Level: High    Nutrition Interventions:    (Diet per Dr)  Continued Inpatient Monitoring, Education Initiated (best effort po intake)    Nutrition Evaluation:   · Evaluation: Goals set   · Goals: 75% or more of meal intake during LOS    · Monitoring: Meal Intake, Diet Tolerance, Skin Integrity, Weight, Pertinent Labs    See Adult Nutrition Doc Flowsheet for more detail.      Electronically signed by Erik Cheung RD, MATEO on 7/21/18 at 11:45 AM    Contact Number: [phone]

## 2018-07-21 NOTE — CONSULTS
magnesium replacement protocol   Other RX Placeholder    sodium chloride flush  10 mL Intravenous 2 times per day    calcium-vitamin D  1 tablet Oral BID    vitamin D  1 tablet Oral Daily    levothyroxine  125 mcg Oral Daily    pravastatin  40 mg Oral Nightly    predniSONE  5 mg Oral BID    vitamin B-6  100 mg Oral Daily    enoxaparin  40 mg Subcutaneous Q24H    tbo-filgrastim  300 mcg Subcutaneous Q24H    piperacillin-tazobactam  3.375 g Intravenous Q8H     Continuous Infusions:  PRN Meds:sodium chloride flush, methadone  Allergies:   ALLERGIES: Patient has no known allergies. SOCIAL HISTORY:     TOBACCO:   reports that he quit smoking about 36 years ago. His smoking use included Cigarettes, Pipe, and Cigars. He has never used smokeless tobacco.     ETOH:   reports that he does not drink alcohol. Patient currently lives alone. His neighbour help him      FAMILY HISTORY:     Family History   Problem Relation Age of Onset    High Blood Pressure Mother     Diabetes Mother     Cancer Father         throat       REVIEW OF SYSTEMS:     Constitutional: no fever, no night sweats, +fatigue. Head: no head ache , no head injury, no migranes. Eye: no blurring of vision, no double vision.   Ears: no hearing difficulty, no tinnitus  Mouth/throat: no ulceration, Lungs: no cough no chest pain  CVS: no palpitation, no chest pain, no shortness of breath  GI: no abdominal pain, + nausea , +vomiting, no constipation  DELTA: no dysuria, frequency and urgency, no hematuria, no kidney stones  Musculoskeletal: no joint pain, swelling , stiffness,  Endocrine: no polyuria, polydipsia, no cold or heat intolerance  Hematology: no anemia, no easy brusing or bleeding, no hx of clotting disorder  Dermatology: no skin rash, no eczema, no pruritis,      PHYSICAL EXAM:     /64   Pulse 86   Temp 98.1 °F (36.7 °C) (Oral)   Resp 20   Ht 6' 2\" (1.88 m)   Wt 145 lb 9.6 oz (66 kg)   SpO2 99%   BMI 18.69 kg/m²

## 2018-07-21 NOTE — PROGRESS NOTES
Hospitalist Progress Note    Patient:  Kat Macedo Sr.      Unit/Bed:5K-09/009-A    YOB: 1950    MRN: [id number]       Acct: [de-identified]     PCP: Anil Olivera DO    Date of Admission: 7/20/2018    Chief Complaint: DISJOINTED HX FROM PT- MAINLY OBTAINED FROM ER NOTES. Pt had 2 ER visits; first one for foot pain secondary to his dog landing on his foot. He was put on Bactrim for incidental asxatic bacteriuria then returned with nausea and emesis. He has a hx of metastatic prostate ca with multiple skeletal mets    Hospital Course: Just arrived. Subjective:  Feels ok this am; showed me an inguinal hernia. Medications:  Reviewed    Infusion Medications   Scheduled Medications    potassium (CARDIAC) replacement protocol   Other RX Placeholder    magnesium replacement protocol   Other RX Placeholder    sodium chloride flush  10 mL Intravenous 2 times per day    aspirin  325 mg Oral Q48H    calcium-vitamin D  1 tablet Oral BID    vitamin D  1 tablet Oral Daily    levothyroxine  125 mcg Oral Daily    pravastatin  40 mg Oral Nightly    predniSONE  5 mg Oral BID    vitamin B-6  100 mg Oral Daily    enoxaparin  40 mg Subcutaneous Q24H    tbo-filgrastim  300 mcg Subcutaneous Q24H    piperacillin-tazobactam  3.375 g Intravenous Q8H     PRN Meds: sodium chloride flush, methadone      Intake/Output Summary (Last 24 hours) at 07/21/18 0817  Last data filed at 07/21/18 0423   Gross per 24 hour   Intake             3860 ml   Output                0 ml   Net             3860 ml       Diet:  DIET GENERAL; Low Microbial    Exam:  /65   Pulse 74   Temp 97.9 °F (36.6 °C) (Oral)   Resp 16   Ht 6' 2\" (1.88 m)   Wt 145 lb 9.6 oz (66 kg)   SpO2 100%   BMI 18.69 kg/m²     General appearance: No apparent distress, appears stated age and cooperative. Chronically ill appearing    HEENT: Pupils equal, round, and reactive to light. Conjunctivae/corneas clear.   Neck:

## 2018-07-21 NOTE — PLAN OF CARE
Problem: Nutrition  Goal: Optimal nutrition therapy  Outcome: Ongoing  Nutrition Problem: Severe malnutrition  Intervention: Food and/or Nutrient Delivery:  (Diet per Dr)  Nutritional Goals: 75% or more of meal intake during LOS

## 2018-07-22 NOTE — DISCHARGE SUMMARY
intake/output:  Intake/Output Summary (Last 24 hours) at 07/22/18 1245  Last data filed at 07/22/18 0446   Gross per 24 hour   Intake             2118 ml   Output                0 ml   Net             2118 ml         General appearance:  No apparent distress, appears stated age and cooperative. Chronically ill appearing    HEENT:  Normal cephalic, atraumatic without obvious deformity. Pupils equal, round, and reactive to light. Extra ocular muscles intact. Conjunctivae/corneas clear. Neck: Supple, with full range of motion. No jugular venous distention. Trachea midline. Respiratory:  Normal respiratory effort. Clear to auscultation, bilaterally without Rales/Wheezes/Rhonchi. Cardiovascular:  Regular rate and rhythm with normal S1/S2 without murmurs, rubs or gallops. Abdomen: Soft, non-tender, non-distended with normal bowel sounds. Musculoskeletal:  No clubbing, cyanosis or edema bilaterally. Full range of motion without deformity. Skin: Skin color, texture, turgor normal.  No rashes or lesions. Neurologic:  Neurovascularly intact without any focal sensory/motor deficits. Cranial nerves: II-XII intact, grossly non-focal.  Psychiatric:  Alert and oriented, thought content appropriate, normal insight  Capillary Refill: Brisk,< 3 seconds   Peripheral Pulses: +2 palpable, equal bilaterally       Labs:  For convenience and continuity at follow-up the following most recent labs are provided:      CBC:    Lab Results   Component Value Date    WBC 2.3 07/22/2018    HGB 7.2 07/22/2018    HCT 21.3 07/22/2018     07/22/2018       Renal:  Lab Results   Component Value Date     07/21/2018    K 3.7 07/22/2018    K 3.3 07/21/2018    CL 98 07/21/2018    CO2 23 07/21/2018    BUN 18 07/21/2018    CREATININE 1.1 07/21/2018    CALCIUM 6.9 07/21/2018         Significant Diagnostic Studies    Radiology:   XR CHEST PORTABLE   Final Result   No acute findings               **This report has been created using voice recognition software. It may contain minor errors which are inherent in voice recognition technology. **      Final report electronically signed by Dr. Karen Benavides on 7/20/2018 1:21 PM             Consults:     PHARMACY TO DOSE VANCOMYCIN  IP CONSULT TO INFECTIOUS DISEASES  IP CONSULT TO SOCIAL WORK  IP CONSULT TO ONCOLOGY    Disposition:    [x] Home       [] TCU       [] Rehab       [] Psych       [] SNF       [] [facility]       [] Other-    Condition at Discharge: Stable    Code Status:  Full Code     Patient Instructions:    Discharge lab work: Activity: activity as tolerated  Diet: DIET GENERAL; Low Microbial      Follow-up visits:   Santiago Patino DO  [address]  [address] [facility]    In 1 week      Saintclair Senate, MD  [address] [facility]  [phone]    In 1 week           Discharge Medications:      Manny Persaud, [address] Medication Instructions JENN:[id number]    Printed on:07/22/18 8189   Medication Information                      acetaminophen (TYLENOL) 325 MG tablet  Take 2 tablets by mouth every 4 hours as needed for Pain             atenolol (TENORMIN) 25 MG tablet  Take 25 mg by mouth daily             bisacodyl (BISAC-EVAC) 10 MG suppository  Place 1 suppository rectally as needed for Constipation             Cholecalciferol (VITAMIN D-3) 1000 units CAPS  Take 1 capsule by mouth daily             ciprofloxacin (CIPRO) 500 MG tablet  Take 1 tablet by mouth 2 times daily for 10 days             dexamethasone (DECADRON) 4 MG tablet  Take 2 tabs (8 mg) by mouth 2 times daily for three days beginning 1 day prior to scheduled DOCEtaxel administration. levothyroxine (SYNTHROID) 100 MCG tablet  Take 125 mcg by mouth Daily              methadone (DOLOPHINE) 5 MG tablet  Take 1 tablet by mouth every 6 hours as needed for Pain for up to 30 days. .             Misc.  Devices (COMMODE BEDSIDE) MISC  1 Units by Does not apply route daily             Multiple Vitamins-Minerals (MENS MULTI VITAMIN & MINERAL PO)  Take by mouth             ondansetron (ZOFRAN) 4 MG tablet  Take 1 tablet by mouth every 8 hours as needed for Nausea or Vomiting             pravastatin (PRAVACHOL) 40 MG tablet  Take 40 mg by mouth daily. predniSONE (DELTASONE) 5 MG tablet  Take 1 tablet by mouth 2 times daily             vitamin B-6 (PYRIDOXINE) 100 MG tablet  Take 100 mg by mouth daily                 Time Spent on discharge is more than 45 minutes in the examination, evaluation, counseling and review of medications and discharge plan. Signed: Thank you Sherry Kaur DO for the opportunity to be involved in this patient's care.     Electronically signed by Lc Osullivan MD on 7/22/2018 at 12:45 PM

## 2018-07-22 NOTE — PROGRESS NOTES
Progress note: Infectious diseases    Patient - Rober Linton,  Age - 78 y.o.    - 1950      Room Number - 0U-61/083-Q   MRN -  [id number]   [facility]  Date of Admission -  2018 12:41 PM    SUBJECTIVE:   No new issues  Plan for discharge today  OBJECTIVE   VITALS    height is 6' 2\" (1.88 m) and weight is 145 lb 9.6 oz (66 kg). His oral temperature is 97.6 °F (36.4 °C). His blood pressure is 112/72 and his pulse is 67. His respiration is 16 and oxygen saturation is 100%.        Wt Readings from Last 3 Encounters:   18 145 lb 9.6 oz (66 kg)   18 140 lb 12.8 oz (63.9 kg)   18 140 lb 12.8 oz (63.9 kg)       I/O (24 Hours)    Intake/Output Summary (Last 24 hours) at 18 1213  Last data filed at 18 0446   Gross per 24 hour   Intake             2118 ml   Output                0 ml   Net             2118 ml       General Appearance  Awake, alert, oriented,   HEENT - normocephalic, atraumatic, slighlty pale conjunctiva,  anicteric sclera  Neck - Supple, no mass  Lungs -  Bilateral  air entry, no rhonchi, no wheeze  Cardiovascular - Heart sounds are normal.    Abdomen - soft, not distended, nontender, right inguinal swelling(hernia)  Neurologic -oriented  Skin - No bruising or bleeding  Extremities - No edema, no cyanosis, clubbing , long dystrophic nails    MEDICATIONS:      potassium (CARDIAC) replacement protocol   Other RX Placeholder    magnesium replacement protocol   Other RX Placeholder    sodium chloride flush  10 mL Intravenous 2 times per day    calcium-vitamin D  1 tablet Oral BID    vitamin D  1 tablet Oral Daily    levothyroxine  125 mcg Oral Daily    pravastatin  40 mg Oral Nightly    predniSONE  5 mg Oral BID    vitamin B-6  100 mg Oral Daily    enoxaparin  40 mg Subcutaneous Q24H    tbo-filgrastim  300 mcg Subcutaneous Q24H    piperacillin-tazobactam 3.375 g Intravenous Q8H       heparin flush (100 units/mL), sodium chloride flush, methadone      LABS:     CBC:   Recent Labs      07/20/18   1335  07/21/18   0441  07/22/18   0440   WBC  2.3*  2.0*  2.3*   HGB  8.3*  7.3*  7.2*   PLT  183  152  147     BMP:  Recent Labs      07/19/18   1435  07/21/18   0441  07/22/18   0440   NA  135  136   --    K  3.2*  3.3*  3.7   CL  94*  98   --    CO2  24  23   --    BUN  14  18   --    CREATININE  0.8  1.1   --    GLUCOSE  81  72   --      Calcium:  Recent Labs      07/21/18   0441   CALCIUM  6.9*     Ionized Calcium:No results for input(s): IONCA in the last 72 hours. Magnesium:  Recent Labs      07/21/18   0441   MG  1.7     Phosphorus:No results for input(s): PHOS in the last 72 hours. BNP:No results for input(s): BNP in the last 72 hours. Glucose:  Recent Labs      07/21/18   1703  07/21/18   1959  07/22/18   0818   POCGLU  103  112*  87     HgbA1C: No results for input(s): LABA1C in the last 72 hours. INR: No results for input(s): INR in the last 72 hours. Hepatic: Recent Labs      07/19/18   1435   ALKPHOS  300*   ALT  <5*   AST  59*   PROT  6.5   BILITOT  1.0   LABALBU  3.5     Amylase and Lipase:  Recent Labs      07/19/18   1435   LACTA  0.7     Lactic Acid:   Recent Labs      07/19/18   1435   LACTA  0.7     Troponin: No results for input(s): CKTOTAL, CKMB, TROPONINI in the last 72 hours. BNP: No results for input(s): BNP in the last 72 hours.     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

## 2018-07-22 NOTE — PLAN OF CARE
Problem: Falls - Risk of:  Goal: Will remain free from falls  Will remain free from falls   Outcome: Ongoing  Pt remains free from falls. Pt using call light appropriately. Bed alarm on, nonskid footwear on, bed rails up x2, hourly rounding, call light in reach, fall sign posted. Problem: Risk for Impaired Skin Integrity  Goal: Tissue integrity - skin and mucous membranes  Structural intactness and normal physiological function of skin and  mucous membranes. Outcome: Ongoing  Pt shows no sign of skin breakdown. Skin assessments ongoing. Pt encouraged to turn and reposition every 2 hours.

## 2018-07-22 NOTE — PROGRESS NOTES
NA    Goals:  Patient goals : To go home today    Short term goals  Time Frame for Short term goals: NA pt safe for d/c home and plans discharge this date    Evaluation Complexity: Based on the findings of patient history, examination, clinical presentation, and decision making during this evaluation, this patient is of low complexity. OT G-codes  Functional Assessment Tool Used: [facility]  Score: 23  Functional Limitation: Self care  Self Care Current Status (): At least 1 percent but less than 20 percent impaired, limited or restricted  Self Care Goal Status (): At least 1 percent but less than 20 percent impaired, limited or restricted  Self Care Discharge Status ():  At least 1 percent but less than 20 percent impaired, limited or restricted  AM-PAC Inpatient Daily Activity Raw Score: 23  AM-PAC Inpatient ADL T-Scale Score : 51.12  ADL Inpatient CMS 0-100% Score: 15.86  ADL Inpatient CMS G-Code Modifier : CI

## 2018-07-23 NOTE — TELEPHONE ENCOUNTER
Navigation:     Karthikeyan outreach to Eileen Hernandez, pt neighbor with query re: pt status since admission to hospital 7-20-18 to  7-22-18, per Dr. Sagar Sawant request.    Pt admitted with N/V, which was related to Bactrim RX given by ER for UTI +SERRATIA. Pt with dehydration, & received IVF hydration, AB changed to Cipro 500 mg BID x10 days. Eileen Hernandez reports pt doing \"alot better\" since being home; walking around, & getting out & about, which he wasn't prior to being hospitalized. \"    Pt FU appt is Wed 7-25-18 with Dr Sagar Sawant. Karthikeyan remains available to assist as needed.

## 2018-07-24 NOTE — TELEPHONE ENCOUNTER
Navigation:     Karthikeyan received phone call from pt neighbor, Nicolas Bautista. She inquires if the labwork the [facility] needs to eval pt thyroid function to continue with synthroid rx coverage. Karthikeyan shared that the [facility] needs to call [facility], provide the order request & where the labwork needs faxed, so pt can have the testing completed with his other labwork when he comes to office for appt tomorrow. Astrid verbalized understanding & will updated [facility] accordingly. FU tomorrow as scheduled.

## 2018-07-25 NOTE — TELEPHONE ENCOUNTER
Navigation:     Karthikeyan received phone call from DON at [facility], Jul 3 Chrissie Finley. She informs that Dr. Dailey Courser needs a TSH drawn with pt's next visit to [facility] [address]  so they can cont with synthroid coverage. Karthikeyan transferred her to [facility] to provide the order.      Karthikeyan following to assist.

## 2018-07-25 NOTE — TELEPHONE ENCOUNTER
MEGHA FROM [facility] CALLED THAT  HAS A  DISCREPENCY WITH SYNTHROID DOSE AND IF WE COULD DRAW A TSH WITH HIS NEXT OFFICE VISIT.

## 2018-07-25 NOTE — PROGRESS NOTES
Name: Princess Reddy  : 1950  MRN: [id number]    Oncology Navigation Follow-Up Note    Contact Type:  Medical Oncology - metastatic prostate cancer    Subjective: \"See doctor\"    Objective: Palliative Chemo with Taxotere with 1st cycle given 18; prednisone cont. Methadone 5 mg every 6 hours for pain & is effective in controlling pain   +Appetite->eating well with neighbor bringing pt home cooked meals. Nausea controlled with Zofran  WBC 15.3 ANC 8.4  HGB 8.1 today  HCT 23.2     BUN 10  Crea 0.7  Total PRO 6.2  K+ 3.5    Cont with Cipro therapy for recent UTI with serratia. .    Assistance Needed: N/A today    Referrals: N/A today    Education: N/A today    Notes: Pt doing well. No complaints. Karthikeyan cont to follow for needs.

## 2018-07-25 NOTE — PROGRESS NOTES
[facility] Discharge instructions given to patient-verbalizes understanding. Ambulated off unit per self with belongings.

## 2018-07-25 NOTE — PATIENT INSTRUCTIONS
1.  No hydration, no transfusion today  2. Venous Doppler of the left leg  3. RTC in one week to see me, for labs, and for cycle #2 of Taxotere  4.   RTC labs: CBC, BMP, LFTs

## 2018-07-25 NOTE — PROGRESS NOTES
Lab specimen obtained from [facility] without any problems.  Ambulated off unit per self to examination room for appointment with Dr. Heather Ovalle accompanied by caregiver escorted by Ursula Feldman

## 2018-07-25 NOTE — PROGRESS NOTES
[address] [facility] PROFESSIONAL SERVS  [facility]  [address] [facility] [address]  [address] [facility] [address]  [address] [address]  Dept: [phone]  Dept Fax: [phone]  Loc: [phone]     Visit Date: 7/25/2018     Elza Chavis Sr. is a 78 y.o. male who presents today for:   Chief Complaint   Patient presents with    Follow-up     METS PROSTATE        HPI:  This is a 79 yo AAM diagnosed with prostate cancer in 2011. He underwent RALRP in June 2011 that showed 2 cm moderately differentiated adenocarcinoma, Elmo score 7 (3+4), all margins were negative, there was no involvement of seminal vesicles, there was evidence of perineural involvement. His surgery was complicated by incontinence and erectile dysfunction. His 5 months PSA was up from 0.07 after surgery to 0.23. Per Dr. Camelia Myers note the patient did receive delayed RTC sometime in 2014, however there is no Rad Onc note in Epic to confirm that. His PSA was slowly but steadily rising from 2011. In 11/2015 it was 4.98, then in February 2016 it decrease to 0.84. Sometime in 2014/2015 he was started on Firmagon injections due to rising PSA, then the patient was lost to follow up between February 2016 and November 2017 when his PSA was 646.7. On November 10, 2017 the patient presented to ED with constipation and weight loss of a 2 month. Imaging studies showed :  diffuse osseous abnormalities. There is extensive heterogeneous appearance of the osseous framework throughout all structures. This is a significant interval change since the prior exam, and is consistent with diffuse osseous metastatic lesions    until proven otherwise. No evidence of fracture.       Soft tissues are nonspecific. No convincing pathologically enlarged femoral lymph nodes though the femoral lymph nodes are slightly prominent, short axis diameters of approximately 8-9 mm. Bone scan showed :  Extensive increased activity throughout the axial and appendicular skeleton.  There is irregular ill-defined increased activity in the skull, right frontal region.       Mottled increased activity on most of the ribs. Similar finding can be seen with the vertebral bodies particularly from the anterior posterior projection this is well seen in the lower lumbar bodies. Increased activity in the proximal femora bilaterally    in addition to more focal areas of increased activity in the femoral diaphyses findings are consistent with widespread prostatic disease which can be seen on the patient's recent CT scan abdomen pelvis. While in the hospital the patient received Felisha Timothy and was started on Casodex. In November 2017 he underwent cystoscopy, no bladder abnormality was found. Sometime in December 2017, January 2018, he started Ward-barre injection. From January till May 2018 he continued  Firmagon injections. However his PSA was increasing very quickly. His PSA doubled between March 9, 2018 and April 25, 2018. Most recent PSA on May 21, 2018 was 574. Sometime in May the patient was started on XTANDI. On 5/27/18 pt presented to [facility] ER for weakness and pain. Palliative chemotherapy with Taxotere started on July 12, 2018. Interim history on July 25, 2018:  Last weekend the patient developed uncontrolled nausea and vomiting after being placed on Bactrim for UTI. He was hospitalized for 48 hours ,hydrated and his treatment was switched from Bactrim to Cipro. Today the patient reports that he doesn't have any nausea. His pain is well controlled. His  food intake is improved. The patient reports that his diffuse bone pain is improved with methadone. He is less constipated, as he has bowel movements every other day.        HPI   Past Medical History:   Diagnosis Date    Backache     Cancer [facility]) 2011    prostate    Goiter     Hyperlipidemia     Hypertension     Hypothyroidism     Incontinence of urine 2/28/2012    Osteoarthritis       Past Surgical History:   Procedure Laterality Date    COLONOSCOPY      [place] COLSC FLX W/RMVL OF TUMOR POLYP LESION SNARE TQ N/A 11/14/2017    COLONOSCOPY POLYPECTOMY SNARE/COLD BIOPSY performed by Lara Mcduffie [facility] at [facility]    [place] ESOPHAGOGASTRODUODENOSCOPY TRANSORAL DIAGNOSTIC N/A 11/14/2017    EGD ESOPHAGOGASTRODUODENOSCOPY performed [facility]    [place] INSJ TUNNELED CTR VAD W/SUBQ PORT AGE 5 YR/> N/A 7/11/2018    SINGLE LUMEN MEDIPORT INSERTION performed by Missy Carver MD at [address] [facility]  2011    Dr. Francisca Freed      Family History   Problem Relation Age of Onset    High Blood Pressure Mother     Diabetes Mother     Cancer Father         throat      Social History   Substance Use Topics    Smoking status: Former Smoker     Types: Cigarettes, Pipe, Cigars     Quit date: 10/4/1981    Smokeless tobacco: Never Used      Comment: social smoker    Alcohol use No      Current Outpatient Prescriptions   Medication Sig Dispense Refill    ondansetron (ZOFRAN) 4 MG tablet Take 1 tablet by mouth every 8 hours as needed for Nausea or Vomiting 15 tablet 0    methadone (DOLOPHINE) 5 MG tablet Take 1 tablet by mouth every 6 hours as needed for Pain for up to 30 days. Fuentes Kayser Date: 7/25/18 90 tablet 0    predniSONE (DELTASONE) 5 MG tablet Take 1 tablet by mouth 2 times daily 60 tablet 2    lidocaine-prilocaine (EMLA) 2.5-2.5 % cream Apply topically as needed. 30 g 1    ciprofloxacin (CIPRO) 500 MG tablet Take 1 tablet by mouth 2 times daily for 10 days 10 tablet 0    bisacodyl (BISAC-EVAC) 10 MG suppository Place 1 suppository rectally as needed for Constipation 5 suppository 1    Misc. Devices (COMMODE BEDSIDE) MISC 1 Units by Does not apply route daily 1 each 0    dexamethasone (DECADRON) 4 MG tablet Take 2 tabs (8 mg) by mouth 2 times daily for three days beginning 1 day prior to scheduled DOCEtaxel administration.  12 tablet 2    vitamin B-6 (PYRIDOXINE) 100 MG tablet Take 100 mg by mouth daily      atenolol (TENORMIN) 25 MG tablet Take 25 mg by mouth daily      Cholecalciferol (VITAMIN D-3) 1000 units CAPS Take 1 capsule by mouth daily      acetaminophen (TYLENOL) 325 MG tablet Take 2 tablets by mouth every 4 hours as needed for Pain 120 tablet 3    Multiple Vitamins-Minerals (MENS MULTI VITAMIN & MINERAL PO) Take by mouth      levothyroxine (SYNTHROID) 100 MCG tablet Take 125 mcg by mouth Daily       pravastatin (PRAVACHOL) 40 MG tablet Take 40 mg by mouth daily. No current facility-administered medications for this visit. Facility-Administered Medications Ordered in Other Visits   Medication Dose Route Frequency Provider Last Rate Last Dose    sodium chloride flush 0.9 % injection 10 mL  10 mL Intercatheter PRN Neeraj Manuel MD   10 mL at 07/25/18 0820    sodium chloride flush 0.9 % injection 20 mL  20 mL Intercatheter PRN Neeraj Manuel MD   20 mL at 07/25/18 0821    heparin flush 100 UNIT/ML injection 500 Units  500 Units Intercatheter PRN Neeraj Manuel MD   500 Units at 07/25/18 0920      No Known Allergies   Health Maintenance   Topic Date Due    Hepatitis C screen  1950    DTaP/Tdap/Td vaccine (1 - Tdap) 10/25/1970    Lipid screen  10/25/1991    Shingles Vaccine (1 of 2 - 2 Dose Series) 10/25/2001    Pneumococcal high/highest risk (1 of 2 - PCV13) 10/25/2016    Flu vaccine (1) 09/01/2018    TSH testing  11/11/2018    Creatinine monitoring  07/21/2019    Potassium monitoring  07/22/2019    Colon cancer screen colonoscopy  11/14/2027    AAA screen  Completed        Subjective:   Review of Systems   Constitutional: Positive for appetite change, fatigue and unexpected weight change. Negative for activity change and fever. HENT: Negative for congestion, dental problem, facial swelling, hearing loss, mouth sores, nosebleeds, sore throat, tinnitus and trouble swallowing. Eyes: Negative for discharge and visual disturbance.    Respiratory: Negative for cough, chest tightness, shortness of breath and wheezing. Cardiovascular: Negative for chest pain, palpitations and leg swelling. Gastrointestinal: Positive for constipation and nausea. Negative for abdominal distention, abdominal pain, blood in stool, diarrhea, rectal pain and vomiting. Endocrine: Negative for cold intolerance, polydipsia and polyuria. Genitourinary: Positive for difficulty urinating (incontinence). Negative for decreased urine volume, dysuria, flank pain, hematuria and urgency. Musculoskeletal: Positive for back pain. Negative for arthralgias, gait problem, joint swelling, myalgias and neck stiffness. Skin: Negative for color change, rash and wound. Neurological: Positive for weakness. Negative for dizziness, tremors, seizures, speech difficulty, light-headedness, numbness and headaches. Hematological: Negative for adenopathy. Does not bruise/bleed easily. Psychiatric/Behavioral: Negative for confusion and sleep disturbance. The patient is not nervous/anxious. Objective:   Physical Exam   Constitutional: He is oriented to person, place, and time. He appears well-developed and well-nourished. No distress. HENT:   Head: Normocephalic. Mouth/Throat: Oropharynx is clear and moist. No oropharyngeal exudate. Eyes: EOM are normal. Pupils are equal, round, and reactive to light. Right eye exhibits no discharge. Left eye exhibits no discharge. No scleral icterus. Neck: Normal range of motion. Neck supple. No JVD present. No tracheal deviation present. No thyromegaly present. Cardiovascular: Normal rate and normal heart sounds. Exam reveals no gallop and no friction rub. No murmur heard. Pulmonary/Chest: Effort normal and breath sounds normal. No stridor. No respiratory distress. He has no wheezes. He has no rales. He exhibits no tenderness. Abdominal: Soft. Bowel sounds are normal. He exhibits no distension and no mass. There is no tenderness.  There is no tablet    US DOPPLER VENOUS LEG LEFT   3. Bone metastases (HCC)  methadone (DOLOPHINE) 5 MG tablet    US DOPPLER VENOUS LEG LEFT   4. Cancer related pain  methadone (DOLOPHINE) 5 MG tablet    US DOPPLER VENOUS LEG LEFT   5. Microcytic anemia     6. Acute cystitis without hematuria          Plan: 1. Metastatic prostate cancer. The patient has progressed on two lines ADT with Casodex and recently XTANDI. His PSA almost doubles every month. The patient didn't have any imaging studies since January 2018. Therefore he had CT of the chest abdomen pelvis and bone scan, they confirmed extensive skeletal involvement and retroperitoneal lymphadenopathy. Treatment for patients with disseminated prostate cancer that has progressed on androgen deprivation therapy and abiraterone is Taxotere chemotherapy in combination with prednisone that has been shown to significantly prolong overall survival. The  potential toxicities associated with Taxotere (myelosuppression, febrile infections, neuropathy) might be difficult to monitor in the patient who was noncompliant in the past.  Currently he has very good social support from his neighbor Mrs. Henderson. 2.  Palliative chemotherapy with Taxotere. First cycle given on July 12, 2018. He continues a prednisone twice a day. He is also on Xgeva and Lupron. 3.  Cancer related pain. The patient reports that methadone 5 mg every 6 hours  Is controlling his pain very well. 4.  Microcytic anemia. The patient received blood transfusion on July 18, 2018. His hemoglobin has improved from 6.9 to 8.1 today. Denies active bleeding will continue monitoring  5. Nausea. Well controlled with Zofran. 6.  Hypokalemia. Resolved potassium is 3.5 today. 7.  UTI. Urine culture positive for serratia marcescens >100,000 CFU/mL, sensitive to Cipro. Patient will complete 1 week course of Cipro August 1, 2018        Return in about 1 week (around 8/1/2018).      Orders Placed:   Orders Placed This Encounter   Procedures    US DOPPLER VENOUS LEG LEFT     Standing Status:   Future     Standing Expiration Date:   9/23/2018     Scheduling Instructions:      Left leg swelling, rule out DVT        Medications Prescribed:   Orders Placed This Encounter   Medications    ondansetron (ZOFRAN) 4 MG tablet     Sig: Take 1 tablet by mouth every 8 hours as needed for Nausea or Vomiting     Dispense:  15 tablet     Refill:  0    methadone (DOLOPHINE) 5 MG tablet     Sig: Take 1 tablet by mouth every 6 hours as needed for Pain for up to 30 days. Terry Burns Date: 7/25/18     Dispense:  90 tablet     Refill:  0    predniSONE (DELTASONE) 5 MG tablet     Sig: Take 1 tablet by mouth 2 times daily     Dispense:  60 tablet     Refill:  2    lidocaine-prilocaine (EMLA) 2.5-2.5 % cream     Sig: Apply topically as needed.      Dispense:  30 g     Refill:  1

## 2018-07-25 NOTE — PLAN OF CARE
Problem: Infection - Central Venous Catheter-Associated Bloodstream Infection:  Intervention: Infection risk assessment  Instructed to monitor for signs/symptoms of infection at [address] [facility] [address] [facility] and call MD if problems develop. Goal: Will show no infection signs and symptoms  Will show no infection signs and symptoms   Outcome: Met This Shift  Mediport site with no redness or warmth. Skin over port site intact with no signs of breakdown noted. Patient verbalizes signs/symptoms of port infection and when to notify the physician. Problem: Discharge Planning  Intervention: Interaction with patient/family and care team  Discuss understanding of discharge instructions,follow-up appointments, and when to call the physician. Goal: Knowledge of discharge instructions  Knowledge of discharge instructions     Outcome: Met This Shift  Verbalized understanding of discharge instructions, follow-up appointments, and when to call the physician. Problem: Musculor/Skeletal Functional Status  Intervention: Fall precautions  Verbalized understanding of fall prevention to ask for assistance with ambulation. Call light within reach. Goal: Absence of falls  Outcome: Met This Shift  No falls occurred with visit today. Comments: Care plan reviewed with patient and caregiver. Patient and caregiver verbalize understanding of the plan of care and contribute to goal setting.

## 2018-07-26 NOTE — TELEPHONE ENCOUNTER
Spoke with Ishan Allison and per Dr Kishore Maria request Methadone should be 1 tablet every 8 hrs and dispense 90 tablets.

## 2018-07-26 NOTE — TELEPHONE ENCOUNTER
ROCKY  FROM [facility] ASKING FOR CLARIFICATION ON METHADONE. ROCKY STATED THAT IT SHOULD BE AROUND THE CLOCK INSTEAD OF EVERY 6 HRS AS NEEDED. PLEASE ADVISE AND THANKS.

## 2018-07-27 NOTE — TELEPHONE ENCOUNTER
Navigation:     Karthikeyan spoke with Nicky Carson, pt neighbor. She reports she collected the stool specimen & was bringing it to [facility]. She has the order for the testing. Karthikeyan query about pt leg swelling, & Nicky Carson reports the swelling is about the same in the left leg as yesterday, up to calf region, but some swelling has also started in Rt Leg. Karthikeyan emphasized importance of pt elevating his legs when sitting, & reminded Nicky Villarreala that [address] suspects the swelling is related to the adenopathy from cancer involvement. DOPPLER negative for DVT. Karthikeyan shared that if leg becomes painful, red, & warm to seek medical evaluation. Astrid voiced understanding.

## 2018-08-01 NOTE — PLAN OF CARE
Problem: Intellectual/Education/Knowledge Deficit  Intervention: Verbal/written education provided  Chemotherapy Teaching     What is Chemotherapy   Drug action [x]   Method of Administration [x]   Handouts given []     Side Effects  Nausea/vomiting [x]   Diarrhea [x]   Fatigue [x]   Signs / Symptoms of infection [x]   Neutropenia [x]   Thrombocytopenia [x]   Alopecia [x]   neuropathy [x]   [place] diet &  the importance of fluids [x]       Micellaneous  Importance of nutrition [x]   Importance of oral hygiene [x]   When to call the MD [x]   Monitoring labs [x]   Use of supportive services []     Explanation of Drug Regimen / Frequency  Taxotere     Comments  Verbalized understanding to drug,action,side effects and when to call MD     Patient educated blood product transfusion protocol:    Patient receiving 1 unit PRBC's:      - Blood product transfusion information sheet given: questions answered and consent signed  - Take vital signs/ monitor lungs sound prior to transfusion  - Monitor patient for 15 minutes after transfusion started  - Take vital signs / monitor lungs sound in 15 minutes and post transfusion  - Assess IV site   - Monitor patient closely for potential transfusion reaction    Call MD if develop complications once discharged. Goal: Teaching initiated upon admission  Outcome: Met This Shift  Patient verbalizes understanding to verbal information given on taxotere and 1 unit PRBC's,action and possible side effects. Aware to call MD if develop complications. Problem: Discharge Planning  Intervention: Interaction with patient/family and care team  Discuss understanding of discharge instructions,follow-up appointments, and when to call the physician. Goal: Knowledge of discharge instructions  Knowledge of discharge instructions     Outcome: Met This Shift  Verbalized understanding of discharge instructions, follow-up appointments, and when to call the physician.     Problem: Infection - Central Venous Catheter-Associated Bloodstream Infection:  Intervention: Central line needs assessment  Mediport site with no redness or warmth. Skin over port site intact with no signs of breakdown noted. Patient verbalizes signs/symptoms of port infection and when to notify the physician. Goal: Will show no infection signs and symptoms  Will show no infection signs and symptoms   Outcome: Met This Shift  Instructed to monitor for signs/symptoms of infection at medi-port site and call MD if problems develop. Comments: Care plan reviewed with patient and caregiver. Patient and caregiver verbalize understanding of the plan of care and contribute to goal setting.

## 2018-08-01 NOTE — PROGRESS NOTES
Patient assessed for the following post chemotherapy:    Dizziness   No  Lightheadedness  No      Acute nausea/vomiting No  Headache   No  Chest pain/pressure  No  Rash/itching   No  Shortness of breath  No    Patient kept for 20 minutes observation post infusion chemotherapy. Patient tolerated chemotherapy treatment Taxotere without any complications. Also received 1 unit PRBC's without complications. Last vital signs:   /64   Pulse 75   Temp 98.6 °F (37 °C) (Oral)   Resp 18   Ht 6' 2\" (1.88 m)   SpO2 100%       Patient instructed if experience any of the above symptoms following today's infusion,he is to notify MD immediately or go to the emergency department. Discharge instructions given to patient. Verbalizes understanding. Ambulated off unit in stable condition per self accompanied by caegiver with belongings.

## 2018-08-01 NOTE — PROGRESS NOTES
[address] [place] [address] PROFESSIONAL SERVS  [facility]  [address] [facility] [address]  [address] [facility] [address]  [place] [address]  Dept: [phone]  Dept Fax: [phone]  Loc: [phone]     Visit Date: 8/1/2018     Sergio Duque Sr. is a 78 y.o. male who presents today for:   Chief Complaint   Patient presents with    Follow-up     METS PROSTAE CA        HPI:  This is a 79 yo AAM diagnosed with prostate cancer in 2011. He underwent RALRP in June 2011 that showed 2 cm moderately differentiated adenocarcinoma, Huron score 7 (3+4), all margins were negative, there was no involvement of seminal vesicles, there was evidence of perineural involvement. His surgery was complicated by incontinence and erectile dysfunction. His 5 months PSA was up from 0.07 after surgery to 0.23. Per Dr. Re Laws note the patient did receive delayed RTC sometime in 2014, however there is no Rad Onc note in Epic to confirm that. His PSA was slowly but steadily rising from 2011. In 11/2015 it was 4.98, then in February 2016 it decrease to 0.84. Sometime in 2014/2015 he was started on Firmagon injections due to rising PSA, then the patient was lost to follow up between February 2016 and November 2017 when his PSA was 646.7. On November 10, 2017 the patient presented to ED with constipation and weight loss of a 2 month. Imaging studies showed :  diffuse osseous abnormalities. There is extensive heterogeneous appearance of the osseous framework throughout all structures. This is a significant interval change since the prior exam, and is consistent with diffuse osseous metastatic lesions    until proven otherwise. No evidence of fracture.       Soft tissues are nonspecific. No convincing pathologically enlarged femoral lymph nodes though the femoral lymph nodes are slightly prominent, short axis diameters of approximately 8-9 mm. Bone scan showed :  Extensive increased activity throughout the axial and appendicular skeleton.  There is irregular Osteoarthritis       Past Surgical History:   Procedure Laterality Date    COLONOSCOPY      MN COLSC FLX W/RMVL OF TUMOR POLYP LESION SNARE TQ N/A 11/14/2017    COLONOSCOPY POLYPECTOMY SNARE/COLD BIOPSY performed by Payton Dockery MD at [facility]    MN ESOPHAGOGASTRODUODENOSCOPY TRANSORAL DIAGNOSTIC N/A 11/14/2017    EGD ESOPHAGOGASTRODUODENOSCOPY performed by Payton Dockery MD at [facility]    MN INSJ TUNNELED CTR VAD W/SUBQ PORT AGE 5 YR/> N/A 7/11/2018    SINGLE LUMEN MEDIPORT INSERTION performed by Mago Hopper MD at [address] [place], [place] [address]is [place]ter  2011    Dr. Janay Carlson      Family History   Problem Relation Age of Onset    High Blood Pressure Mother     Diabetes Mother     Cancer Father         throat      Social History   Substance Use Topics    Smoking status: Former Smoker     Types: Cigarettes, Pipe, Cigars     Quit date: 10/4/1981    Smokeless tobacco: Never Used      Comment: social smoker    Alcohol use No      Current Outpatient Prescriptions   Medication Sig Dispense Refill    ondansetron (ZOFRAN) 4 MG tablet Take 1 tablet by mouth every 8 hours as needed for Nausea or Vomiting 15 tablet 0    methadone (DOLOPHINE) 5 MG tablet Take 1 tablet by mouth every 6 hours as needed for Pain for up to 30 days. [place] Date: 7/25/18 90 tablet 0    predniSONE (DELTASONE) 5 MG tablet Take 1 tablet by mouth 2 times daily 60 tablet 2    lidocaine-prilocaine (EMLA) 2.5-2.5 % cream Apply topically as needed. 30 g 1    ciprofloxacin (CIPRO) 500 MG tablet Take 1 tablet by mouth 2 times daily for 10 days 10 tablet 0    bisacodyl (BISAC-EVAC) 10 MG suppository Place 1 suppository rectally as needed for Constipation 5 suppository 1    Misc.  Devices (COMMODE BEDSIDE) MISC 1 Units by Does not apply route daily 1 each 0    dexamethasone (DECADRON) 4 MG tablet Take 2 tabs (8 mg) by mouth 2 times daily for three days beginning 1 day prior to scheduled DOCEtaxel administration. 12 tablet 2    vitamin B-6 (PYRIDOXINE) 100 MG tablet Take 100 mg by mouth daily      atenolol (TENORMIN) 25 MG tablet Take 25 mg by mouth daily      Cholecalciferol (VITAMIN D-3) 1000 units CAPS Take 1 capsule by mouth daily      Multiple Vitamins-Minerals (MENS MULTI VITAMIN & MINERAL PO) Take by mouth      levothyroxine (SYNTHROID) 100 MCG tablet Take 125 mcg by mouth Daily       pravastatin (PRAVACHOL) 40 MG tablet Take 40 mg by mouth daily.  acetaminophen (TYLENOL) 325 MG tablet Take 2 tablets by mouth every 4 hours as needed for Pain 120 tablet 3     No current facility-administered medications for this visit.       Facility-Administered Medications Ordered in Other Visits   Medication Dose Route Frequency Provider Last Rate Last Dose    sodium chloride flush 0.9 % injection 10 mL  10 mL Intercatheter PRN Gabino Denise MD   10 mL at 08/01/18 1154    sodium chloride flush 0.9 % injection 20 mL  20 mL Intercatheter PRN Gabino Denise MD   20 mL at 08/01/18 1155    heparin flush 100 UNIT/ML injection 500 Units  500 Units Intercatheter PRN Gabino Denise MD        0.9 % sodium chloride bolus  250 mL Intravenous Once Gabino Denise MD 20 mL/hr at 08/01/18 1228 250 mL at 08/01/18 1228    0.9 % sodium chloride infusion   Intravenous Once Gabino Denise MD        DOCEtaxel (TAXOTERE) 137 mg in sodium chloride 0.9 % 250 mL chemo IVPB  75 mg/m2 (Treatment Plan Recorded) Intravenous Once Gabino Denise .7 mL/hr at 08/01/18 1326 137 mg at 08/01/18 1326      No Known Allergies   Health Maintenance   Topic Date Due    Hepatitis C screen  1950    DTaP/Tdap/Td vaccine (1 - Tdap) 10/25/1970    Lipid screen  10/25/1991    Shingles Vaccine (1 of 2 - 2 Dose Series) 10/25/2001    Pneumococcal high/highest risk (1 of 2 - PCV13) 10/25/2016    Flu vaccine (1) 09/01/2018    TSH testing  11/11/2018    Creatinine monitoring  07/21/2019    Potassium monitoring  07/22/2019    Colon cancer screen tracheal deviation present. No thyromegaly present. Cardiovascular: Normal rate and normal heart sounds. Exam reveals no gallop and no friction rub. No murmur heard. Pulmonary/Chest: Effort normal and breath sounds normal. No stridor. No respiratory distress. He has no wheezes. He has no rales. He exhibits no tenderness. Abdominal: Soft. Bowel sounds are normal. He exhibits no distension and no mass. There is no tenderness. There is no rebound. Musculoskeletal: Normal range of motion. He exhibits no edema. Good range of motion in all four extremities. Lymphadenopathy:     He has no cervical adenopathy. Neurological: He is alert and oriented to person, place, and time. He has normal reflexes. No cranial nerve deficit. He exhibits normal muscle tone. Skin: Skin is warm. No rash noted. No erythema. Psychiatric: He has a normal mood and affect. His behavior is normal. Judgment and thought content normal.   Vitals reviewed. /66 (Site: Left Arm, Position: Sitting, Cuff Size: Medium Adult)   Pulse 68   Temp 97.7 °F (36.5 °C) (Oral)   Resp 18   Ht 6' 2.02\" (1.88 m)   Wt 147 lb (66.7 kg)   SpO2 100%   BMI 18.87 kg/m²      Imaging studies and labs:   CBC:   Recent Labs      08/01/18   1100   WBC  11.4*   HGB  7.4*   HCT  21.5*   MCV  83   PLT  162     BMP:   Recent Labs      08/01/18   1100   NA  136   K  3.6   CL  98   CO2  23   BUN  16   CREATININE  0.6   GLUCOSE  95     PT/INR:   No results for input(s): PROTIME, INR in the last 72 hours. APTT:   No results for input(s): APTT in the last 72 hours. PSA:  February 2018: 128.2  March 2018: 132  April 2018: 361  May 2018: 574  June 2018: 949    CT of the abdomen and pelvis on June 29, 2018 showed:  1. No acute intrathoracic abdominal or intrapelvic findings. 2. Retroperitoneal lymphadenopathy highly suspicious for metastatic disease. 3. Extensive osseous metastatic disease. CT of the chest on June 29, 2018 showed:  1.  No acute

## 2018-08-01 NOTE — PROGRESS NOTES
Name: Masood Ruiz  : 1950  MRN: [id number]    Oncology Navigation Follow-Up Note    Contact Type:  Medical Oncology- metastatic prostate cancer    Subjective: Here to see doctor    Objective: Due for Cycle 2 Taxotere today. WBC  11.4  HGB 7.4  HCT 21.5      BUN 16  Crea 0.6  Alk phos 269  ALT 9  AST 66  TSH 6.520    Pain management with methadone    Assistance Needed: Neighbor assisting in pt home management with meds, meals, hygiene, laundry etc.    Referrals: N/A today    Education: N/A today    Notes: 1 unit PRC ordered today also. Pt tolerated cycle 1 chemo well. Karthikeyan cont to follow & assist as needed.

## 2018-08-01 NOTE — ONCOLOGY
Chemotherapy Administration    Pre-assessment Data: Antineoplastic Agents  Other:   See toxicity flow sheet for assessment [x]     Physician Notification of Concerns Related to Chemotherapy Administration:   Physician Notified Thierry Hang / Time of Notification      Interventions:   Lab work assessed  [x]   Height / Weight verified for dose [x]   Current MAR reviewed [x]   Emergency drugs available as appropriate [x]   Anaphylaxis assessment completed [x]   Pre-medications administered as ordered [x]   Blood return noted upon initiation of chemotherapy [x]   Blood return noted each 1-2ml of a vesicant medication if given IV push []   Blood return noted each 2-3ml of a non-vesicant medication if given IV push []   Monitor for signs / symptoms of hypersensitivity reaction [x]   Chemotherapy orders (drug/dose/rate) verified by 2 Chemo certified RNs [x]   Monitor IV site and blood return throughout the infusion of the medication [x]   Document IV site checks on the IV assessment form [x]   Document chemotherapy teaching on the Patient Education tab [x]   Document patient verbalizes understanding of medications being administered [x]   If IV infiltration, see ONS Guidelines []   Other:  Taxotere    []

## 2018-08-06 NOTE — ED PROVIDER NOTES
[facility]  eMERGENCY dEPARTMENT eNCOUnter          CHIEF COMPLAINT       Chief Complaint   Patient presents with    Fatigue       Nurses Notes reviewed and I agree except as noted in the HPI. HISTORY OF PRESENT ILLNESS    [place] W Kaitlynn Penn is a 78 y.o. male who presents to Emergency Department with fatigue. His caretakers whom are his neighbors are with him in the room. Patient states he has felt \"drained\" and fatigue since 8/4/18. He states today he had some visitors throughout the day and spent time outside with them. Patient does not have air conditioning within his residence but does use fans for air. Patient explains that around 2200 today he felt as though \"his soul was leaving\". He has a previous history of prostate cancer that was diagnosed in 2011. Recently, the patient was diagnosed with his cancer metastatic to the bone. He is currently on chemotherapy. He notes lower extremity edema the past 2 weeks. Patient denies cramping, fever or chills, shortness of breath, chest pain, or abdominal pain. Patient denies further complaints at time of initial encounter. REVIEW OF SYSTEMS     Review of Systems   Constitutional: Positive for fatigue. Negative for appetite change, chills and fever. HENT: Negative for congestion, ear pain, rhinorrhea and sore throat. Eyes: Negative for discharge, redness and visual disturbance. Respiratory: Negative for cough, shortness of breath and wheezing. Cardiovascular: Negative for chest pain, palpitations and leg swelling. Gastrointestinal: Negative for abdominal pain, diarrhea, nausea and vomiting. Genitourinary: Negative for decreased urine volume, difficulty urinating, dysuria and hematuria. Musculoskeletal: Negative for arthralgias, back pain, joint swelling and neck pain. Skin: Negative for pallor and rash. Allergic/Immunologic: Negative for environmental allergies.    Neurological: Negative for dizziness, syncope, weakness, TABLET    Take 1 tablet by mouth 2 times daily    VITAMIN B-6 (PYRIDOXINE) 100 MG TABLET    Take 100 mg by mouth daily       ALLERGIES     has No Known Allergies. FAMILY HISTORY     indicated that his mother is . He indicated that his father is . He indicated that his sister is alive. He indicated that his brother is alive. family history includes Cancer in his father; Diabetes in his mother; High Blood Pressure in his mother. SOCIAL HISTORY      reports that he quit smoking about 36 years ago. His smoking use included Cigarettes, Pipe, and Cigars. He has never used smokeless tobacco. He reports that he does not drink alcohol or use drugs. PHYSICAL EXAM     INITIAL VITALS:  oral temperature is 98.3 °F (36.8 °C). His blood pressure is 139/84 and his pulse is 68. His respiration is 18 and oxygen saturation is 98%. Physical Exam   Constitutional: He is oriented to person, place, and time. He appears well-developed and well-nourished. HENT:   Head: Normocephalic and atraumatic. Right Ear: External ear normal.   Left Ear: External ear normal.   Eyes: Conjunctivae are normal. Right eye exhibits no discharge. Left eye exhibits no discharge. No scleral icterus. Neck: Normal range of motion. Neck supple. No JVD present. Pulmonary/Chest: Effort normal and breath sounds normal. No stridor. No respiratory distress. Abdominal: Soft. Bowel sounds are normal. He exhibits no distension. Musculoskeletal: Normal range of motion. He exhibits no edema. Neurological: He is alert and oriented to person, place, and time. He exhibits normal muscle tone. GCS eye subscore is 4. GCS verbal subscore is 5. GCS motor subscore is 6. Skin: Skin is warm and dry. He is not diaphoretic. No erythema. Psychiatric: He has a normal mood and affect. His behavior is normal.   Nursing note and vitals reviewed.         DIFFERENTIAL DIAGNOSIS:   Heat illness, dehydration, electrolyte imbalance     DIAGNOSTIC 12:26 AM Scribing for and in the presence of Destinee Okeefe MD.    Signed by: Kathy Manuel, 08/06/18 2:29 AM    Provider:  I personally performed the services described in the documentation, reviewed and edited the documentation which was dictated to the scribe in my presence, and it accurately records my words and actions.     Destinee Okeefe MD 08/06/18 2:29 AM            Destinee Okeefe MD  08/06/18 2811

## 2018-08-06 NOTE — ED TRIAGE NOTES
Patient presents to the ED with complaints of fatigue. Patient was out in the warm weather and felt later in the day \"like my soul was leaving me\". Patient has a history of cancer. Patient denies any pain at this time. Patient is alert and oriented. Friends at bedside. Patient does not appear in any distress at this time.

## 2018-08-07 NOTE — PROGRESS NOTES
Current Outpatient Prescriptions   Medication Sig Dispense Refill    ondansetron (ZOFRAN) 4 MG tablet Take 1 tablet by mouth every 8 hours as needed for Nausea or Vomiting 15 tablet 0    methadone (DOLOPHINE) 5 MG tablet Take 1 tablet by mouth every 6 hours as needed for Pain for up to 30 days. Carly Archibald Date: 7/25/18 90 tablet 0    predniSONE (DELTASONE) 5 MG tablet Take 1 tablet by mouth 2 times daily 60 tablet 2    lidocaine-prilocaine (EMLA) 2.5-2.5 % cream Apply topically as needed. 30 g 1    bisacodyl (BISAC-EVAC) 10 MG suppository Place 1 suppository rectally as needed for Constipation 5 suppository 1    Misc. Devices (COMMODE BEDSIDE) MISC 1 Units by Does not apply route daily 1 each 0    dexamethasone (DECADRON) 4 MG tablet Take 2 tabs (8 mg) by mouth 2 times daily for three days beginning 1 day prior to scheduled DOCEtaxel administration. 12 tablet 2    vitamin B-6 (PYRIDOXINE) 100 MG tablet Take 100 mg by mouth daily      atenolol (TENORMIN) 25 MG tablet Take 25 mg by mouth daily      Cholecalciferol (VITAMIN D-3) 1000 units CAPS Take 1 capsule by mouth daily      Multiple Vitamins-Minerals (MENS MULTI VITAMIN & MINERAL PO) Take by mouth      levothyroxine (SYNTHROID) 100 MCG tablet Take 125 mcg by mouth Daily       pravastatin (PRAVACHOL) 40 MG tablet Take 40 mg by mouth daily.  acetaminophen (TYLENOL) 325 MG tablet Take 2 tablets by mouth every 4 hours as needed for Pain 120 tablet 3     No current facility-administered medications for this visit. Past Medical History  [place]  has a past medical history of Backache; Cancer ([address] Goiter; Hyperlipidemia; Hypertension; Hypothyroidism; Incontinence of urine; and Osteoarthritis. Past Surgical History  The patient  has a past surgical history that includes Prostate surgery (2011); Thyroid surgery (2011);  Colonoscopy; pr esophagogastroduodenoscopy transoral diagnostic (N/A, 11/14/2017); pr colsc flx w/rmvl of tumor polyp lesion snare tq (N/A, 11/14/2017); and pr insj tunneled ctr vad w/subq port age 12 yr/> (N/A, 7/11/2018). Family History  This patient's family history includes Cancer in his father; Diabetes in his mother; High Blood Pressure in his mother. Social History  [place]  reports that he quit smoking about 36 years ago. His smoking use included Cigarettes, Pipe, and Cigars. He has never used smokeless tobacco. He reports that he does not drink alcohol or use drugs. Subjective:      Review of Systems   Constitutional: Negative for activity change, appetite change, chills, diaphoresis, fatigue, fever and unexpected weight change. HENT: Negative. Eyes: Negative. Respiratory: Negative for chest tightness and shortness of breath. Cardiovascular: Negative for chest pain. Gastrointestinal: Negative for abdominal pain, nausea and vomiting. Endocrine: Negative. Genitourinary: Negative for difficulty urinating, dysuria, flank pain, frequency, hematuria and urgency. Musculoskeletal: Negative for arthralgias and back pain. Objective:   /62   Ht 6' 2\" (1.88 m)   Wt 141 lb (64 kg)   BMI 18.10 kg/m²     Physical Exam   Constitutional: He is oriented to person, place, and time. No distress. Thin   HENT:   Head: Normocephalic and atraumatic. Eyes: Conjunctivae and EOM are normal. Pupils are equal, round, and reactive to light. Right eye exhibits no discharge. Left eye exhibits no discharge. No scleral icterus. Neck: Neck supple. No JVD present. Cardiovascular: Normal rate, regular rhythm and normal heart sounds. Pulmonary/Chest: Effort normal and breath sounds normal. No stridor. No respiratory distress. Abdominal: Soft. Bowel sounds are normal. He exhibits no distension. There is no tenderness. Musculoskeletal: Normal range of motion. He exhibits no edema. Neurological: He is alert and oriented to person, place, and time. Skin: Skin is warm and dry. He is not diaphoretic.

## 2018-08-07 NOTE — PROGRESS NOTES
Navigation:     Karthikeyan received email update from ER Care Coordinator today, reporting that pt was in ER over the weekend & was treated for apparent \"heat exhaustion\". Pt has no air conditioner, only some fans. Karthikeyan outreach to SW with inquiry if there are any community resources that could possibly assist pt with obtaining a window air conditioner. SW will check resources & phone pt caregiver, Nicky Carson with direction.

## 2018-08-08 NOTE — PLAN OF CARE
Problem: Intellectual/Education/Knowledge Deficit  Intervention: Verbal/written education provided  Reviewed signs and symptoms of dehydration. Patient received . 9NS 500ml over 1 hour. Soda 12oz and void x2    Goal: Teaching initiated upon admission  Outcome: Met This Shift  Patient verbalizes understanding to verbal information given on . 9NS 500ml,action and possible side effects. Aware to call MD if develop complications. Problem: Discharge Planning  Intervention: Interaction with patient/family and care team  Provide discharge instructions. Goal: Knowledge of discharge instructions  Knowledge of discharge instructions     Outcome: Met This Shift  Verbalized understanding of discharge instructions, follow-up appointments, and when to call the physician. Problem: Infection - Central Venous Catheter-Associated Bloodstream Infection:  Intervention: Infection risk assessment  Discussed mediport maintenance, infection prevention, and when to call the physician. Labs drawn. Goal: Will show no infection signs and symptoms  Will show no infection signs and symptoms   Outcome: Met This Shift  Mediport site with no redness or warmth. Skin over port intact with no signs of breakdown noted. Patient verbalizes signs/symptoms of port infection and when to notify the physician. Problem: Musculor/Skeletal Functional Status  Intervention: Fall precautions  Discussed fall precautions, fall risks, and instructed patient to ask for assistance with ambulation. Call light within reach. Goal: Absence of falls  Outcome: Met This Shift  Free from falls during infusion. Comments: Care plan reviewed with patient and cargiver. Patient and caregiver verbalize understanding of the plan of care and contribute to goal setting.

## 2018-08-08 NOTE — PROGRESS NOTES
Name: David Sood  : 1950  MRN: [id number]    Oncology Navigation Follow-Up Note    Contact Type:  Medical Oncology- metastatic prostate cancer    Subjective: \"here to see Dr. Sanjiv Colvin". Objective: WBC 4.2  HGB 8.2 (9.4 on 18)  Plt 164  Pt in ER over the weekend with \"Heat exhaustion\". Admits to feeling \"more tired\". No air conditioning, & is not able to open his windows at his house. Using 2 large fans to keep house cool. Limited po liquid  Intake    Assistance Needed: Tuyet Potter cont to be caregiver to patient, providing him transportation, managing his medication administration, meals, laundry, & bathing. Referrals: SS following    Education: POC:  ONC discussed pt benefit to receiving IVF for hydration today & Friday, & FU appt on Monday with a CBC to eval for [facility] transfusion need. Lupron & XGEVA due on Wednesday 8-15-18. Notes: Pt tearful with words of appreciation to Navigator, & Dr. Scarlet Steven for the care he is receiving at the [facility]. Karthikeyan remains available to assist as needed.

## 2018-08-08 NOTE — PATIENT INSTRUCTIONS
1.  IV hydration with 500 MLS of normal saline today and on Friday  2. He will return to clinic to be seen by Leyla Jasso on Monday, August 13, 2018. CBC on that today  3.   RTC in 1 week to see me, for labs: CBC, BMP and to receive Marcy Saenz

## 2018-08-08 NOTE — PROGRESS NOTES
Patient assessed for the following post hydration:    Dizziness   No  Lightheadedness  No        Acute nausea/vomiting No  Headache   No  Chest pain/pressure  No  Rash/itching   No  Shortness of breath  No    Patient tolerated . 9NS 500ml infusion over 1 hour without any complications. Patient drank 12oz soda. Void x2  Lab drawn per mediport. Last vital signs:   /72   Pulse 80   Temp 98.3 °F (36.8 °C) (Oral)   Resp 18   SpO2 97%     Patient instructed if experience any of the above symptoms following today's infusion,he/she is to notify MD immediately or go to the emergency department. Discharge instructions given to patient. Verbalizes understanding. Ambulated off unit per self with caregiver with belongings.

## 2018-08-08 NOTE — PROGRESS NOTES
[address] [facility] PROFESSIONAL SERVS  [facility]  [facility]  [address] [facility] [address]  [address] [address]  Dept: [phone]  Dept Fax: [phone]  Loc: [phone]     Visit Date: 8/8/2018     Karson Armendariz Sr. is a 78 y.o. male who presents today for:   Chief Complaint   Patient presents with    Follow-up     METS PROSTATE        HPI:  This is a 77 yo AAM diagnosed with prostate cancer in 2011. He underwent RALRP in June 2011 that showed 2 cm moderately differentiated adenocarcinoma, Schroeder score 7 (3+4), all margins were negative, there was no involvement of seminal vesicles, there was evidence of perineural involvement. His surgery was complicated by incontinence and erectile dysfunction. His 5 months PSA was up from 0.07 after surgery to 0.23. Per Dr. Fanny Martinez note the patient did receive delayed RTC sometime in 2014, however there is no Rad Onc note in Epic to confirm that. His PSA was slowly but steadily rising from 2011. In 11/2015 it was 4.98, then in February 2016 it decrease to 0.84. Sometime in 2014/2015 he was started on Firmagon injections due to rising PSA, then the patient was lost to follow up between February 2016 and November 2017 when his PSA was 646.7. On November 10, 2017 the patient presented to ED with constipation and weight loss of a 2 month. Imaging studies showed :  diffuse osseous abnormalities. There is extensive heterogeneous appearance of the osseous framework throughout all structures. This is a significant interval change since the prior exam, and is consistent with diffuse osseous metastatic lesions    until proven otherwise. No evidence of fracture.       Soft tissues are nonspecific. No convincing pathologically enlarged femoral lymph nodes though the femoral lymph nodes are slightly prominent, short axis diameters of approximately 8-9 mm. Bone scan showed :  Extensive increased activity throughout the axial and appendicular skeleton.  There is irregular (PYRIDOXINE) 100 MG tablet Take 100 mg by mouth daily      atenolol (TENORMIN) 25 MG tablet Take 25 mg by mouth daily      Cholecalciferol (VITAMIN D-3) 1000 units CAPS Take 1 capsule by mouth daily      acetaminophen (TYLENOL) 325 MG tablet Take 2 tablets by mouth every 4 hours as needed for Pain 120 tablet 3    Multiple Vitamins-Minerals (MENS MULTI VITAMIN & MINERAL PO) Take by mouth      levothyroxine (SYNTHROID) 100 MCG tablet Take 125 mcg by mouth Daily       pravastatin (PRAVACHOL) 40 MG tablet Take 40 mg by mouth daily. No current facility-administered medications for this visit. No Known Allergies   Health Maintenance   Topic Date Due    Hepatitis C screen  1950    DTaP/Tdap/Td vaccine (1 - Tdap) 10/25/1970    Lipid screen  10/25/1991    Shingles Vaccine (1 of 2 - 2 Dose Series) 10/25/2001    Pneumococcal high/highest risk (1 of 2 - PCV13) 10/25/2016    Flu vaccine (1) 09/01/2018    TSH testing  08/06/2019    Potassium monitoring  08/06/2019    Creatinine monitoring  08/06/2019    Colon cancer screen colonoscopy  11/14/2027    AAA screen  Completed        Subjective:   Review of Systems   Constitutional: Positive for appetite change, fatigue and unexpected weight change. Negative for activity change and fever. HENT: Negative for congestion, dental problem, facial swelling, hearing loss, mouth sores, nosebleeds, sore throat, tinnitus and trouble swallowing. Eyes: Negative for discharge and visual disturbance. Respiratory: Negative for cough, chest tightness, shortness of breath and wheezing. Cardiovascular: Negative for chest pain, palpitations and leg swelling. Gastrointestinal: Positive for constipation and nausea. Negative for abdominal distention, abdominal pain, blood in stool, diarrhea, rectal pain and vomiting. Endocrine: Negative for cold intolerance, polydipsia and polyuria. Genitourinary: Positive for difficulty urinating (incontinence).  Negative for decreased urine volume, dysuria, flank pain, hematuria and urgency. Musculoskeletal: Positive for back pain. Negative for arthralgias, gait problem, joint swelling, myalgias and neck stiffness. Skin: Negative for color change, rash and wound. Neurological: Positive for weakness. Negative for dizziness, tremors, seizures, speech difficulty, light-headedness, numbness and headaches. Hematological: Negative for adenopathy. Does not bruise/bleed easily. Psychiatric/Behavioral: Negative for confusion and sleep disturbance. The patient is not nervous/anxious. Objective:   Physical Exam   Constitutional: He is oriented to person, place, and time. He appears well-developed and well-nourished. No distress. HENT:   Head: Normocephalic. Mouth/Throat: Oropharynx is clear and moist. No oropharyngeal exudate. Eyes: EOM are normal. Pupils are equal, round, and reactive to light. Right eye exhibits no discharge. Left eye exhibits no discharge. No scleral icterus. Neck: Normal range of motion. Neck supple. No JVD present. No tracheal deviation present. No thyromegaly present. Cardiovascular: Normal rate and normal heart sounds. Exam reveals no gallop and no friction rub. No murmur heard. Pulmonary/Chest: Effort normal and breath sounds normal. No stridor. No respiratory distress. He has no wheezes. He has no rales. He exhibits no tenderness. Abdominal: Soft. Bowel sounds are normal. He exhibits no distension and no mass. There is no tenderness. There is no rebound. Musculoskeletal: Normal range of motion. He exhibits no edema. Good range of motion in all four extremities. Lymphadenopathy:     He has no cervical adenopathy. Neurological: He is alert and oriented to person, place, and time. He has normal reflexes. No cranial nerve deficit. He exhibits normal muscle tone. Skin: Skin is warm. No rash noted. No erythema. Psychiatric: He has a normal mood and affect.  His behavior is normal. Judgment and thought content normal.   Vitals reviewed. /72 (Site: Right Arm, Position: Sitting, Cuff Size: Medium Adult)   Pulse 80   Temp 98.3 °F (36.8 °C) (Oral)   Resp 18   Ht 6' 2.02\" (1.88 m)   Wt 139 lb 6.4 oz (63.2 kg)   SpO2 97%   BMI 17.89 kg/m²      Imaging studies and labs:   CBC:   Recent Labs      08/13/18   0810   WBC  2.5*   HGB  7.6*   HCT  21.7*   MCV  84   PLT  177     BMP:   No results for input(s): NA, K, CL, CO2, PHOS, BUN, CREATININE, GLUCOSE in the last 72 hours. Invalid input(s): CA  PT/INR:   No results for input(s): PROTIME, INR in the last 72 hours. APTT:   No results for input(s): APTT in the last 72 hours. PSA:  February 2018: 128.2  March 2018: 132  April 2018: 361  May 2018: 574  June 2018: 949    CT of the abdomen and pelvis on June 29, 2018 showed:  1. No acute intrathoracic abdominal or intrapelvic findings. 2. Retroperitoneal lymphadenopathy highly suspicious for metastatic disease. 3. Extensive osseous metastatic disease. CT of the chest on June 29, 2018 showed:  1. No acute intrathoracic findings. 2. Extensive osseous metastatic disease. Bone scan on June 29, 2000 818 showed:  1. Foci of abnormal radiotracer accumulation throughout the visualized skeleton consistent with diffuse osseous metastatic disease and stable compared to the prior examination. 2. Probable degenerative arthropathic changes as detailed above       Assessment/Plan:        Diagnosis Orders   1. CA of prostate ([address]     2. Bone metastases ([address]     3. Cancer related pain     4. Microcytic anemia     5. Dehydration            1.Metastatic prostate cancer. The patient has progressed on two lines ADT with Casodex and recently XTANDI. His PSA almost doubles every month. The patient didn't have any imaging studies since January 2018.   Therefore he had CT of the chest abdomen pelvis and bone scan, they confirmed extensive skeletal involvement and retroperitoneal lymphadenopathy. Treatment for patients with disseminated prostate cancer that has progressed on androgen deprivation therapy and abiraterone is Taxotere chemotherapy in combination with prednisone that has been shown to significantly prolong overall survival. The  potential toxicities associated with Taxotere (myelosuppression, febrile infections, neuropathy) might be difficult to monitor in the patient who was noncompliant in the past.  Currently he has very good social support from his neighbor Mrs. Henderson. Started palliative chemotherapy with Taxotere on July 12, 2008  2. Palliative chemotherapy with Taxotere. Second t cycle given on August 1, 2018. He continues a prednisone twice a day. He is also on Xgeva and Lupron. He reports some mild nausea therefore he will receive IV hydration was 500 mls of normal saline today and on Friday. 3.  Cancer related pain. The patient reports that methadone 5 mg every 6 hours is controlling his pain very well. 4.  Microcytic anemia. The patient received last blood transfusion on August 1,, 2018. Today his hemoglobin is 8.2, will continue monitoring  5. Nausea. Well controlled with Zofran. 6.  Hypokalemia. Resolved potassium is 3.6 today. 7.  UTI. Urine culture positive for serratia marcescens >100,000 CFU/mL, sensitive to Cipro. Patient complete 1 week course of Cipro last week. We will see him again in the clinic in one week to recheck his CBC    8. Oral mucositis. The patient was received prescription for Magic mouthwash    Return in about 1 week (around 8/15/2018). Orders Placed:   No orders of the defined types were placed in this encounter.        Medications Prescribed:   Orders Placed This Encounter   Medications    Magic Mouthwash (MIRACLE MOUTHWASH)     Sig: Swish and spit 5 mLs 4 times daily as needed for Irritation Prepare mixture 1:1:1 DIPHENHYDRAMINE HCL,NYSTATIN,LIDOCAINE HCL     Dispense:  300 mL     Refill:  1

## 2018-08-08 NOTE — PROGRESS NOTES
Labs drawn via central venous catheter, then patient escorted to exam room accompanied by Lotus Eden

## 2018-08-10 NOTE — PLAN OF CARE
Problem: Musculor/Skeletal Functional Status  Intervention: Fall precautions  Discussed fall precautions, fall risks, and instructed patient to ask for assistance with ambulation. Call light within reach. Goal: Absence of falls  Outcome: Met This Shift  Free from falls during infusion. Problem: Intellectual/Education/Knowledge Deficit  Intervention: Verbal/written education provided  Patient received . 9NS 500ml infusion over 1hour. Patient ate a chicken salad sandwich, 2packs PB crackers, and drank 12oz pop. Void x1    Goal: Teaching initiated upon admission  Outcome: Met This Shift  Patient verbalizes understanding to verbal information given on signs and symptoms of dehydration,action and possible side effects. Aware to call MD if develop complications. Problem: Discharge Planning  Intervention: Interaction with patient/family and care team  Provide discharge instructions. Goal: Knowledge of discharge instructions  Knowledge of discharge instructions     Outcome: Met This Shift  Verbalized understanding of discharge instructions, follow-up appointments, and when to call the physician. Problem: Infection - Central Venous Catheter-Associated Bloodstream Infection:  Intervention: Infection risk assessment  Discussed mediport maintenance, infection prevention, and when to call the physician. Goal: Will show no infection signs and symptoms  Will show no infection signs and symptoms   Outcome: Met This Shift  Mediport site with no redness or warmth. Skin over port intact with no signs of breakdown noted. Patient verbalizes signs/symptoms of port infection and when to notify the physician. Comments: Care plan reviewed with patient and caregiver. Patient and caregiver verbalize understanding of the plan of care and contribute to goal setting.

## 2018-08-10 NOTE — TELEPHONE ENCOUNTER
Navigation:     Karthikeyan return phone call to pt's neighbor, Verena Barth. She left message on vm with inquiry about pt receiving Flu Vaccine. Karthikeyan left vm informing that pt is a candidate for the flu vaccine, HOWEVER, it needed to be timed for administration when ONC approves d/t pt receiving chemotherapy. Karthikeyan remains available to assist as needed.

## 2018-08-13 NOTE — PLAN OF CARE
Problem: Musculor/Skeletal Functional Status  Intervention: Fall precautions  Discussed fall precautions, fall risks, and instructed patient to ask for assistance with ambulation. Call light within reach. Goal: Absence of falls  Outcome: Met This Shift  Free from falls during infusion. Problem: Intellectual/Education/Knowledge Deficit  Intervention: Verbal/written education provided  Patient received . 9NS 500ml over 2hours. Patient ate 1 chicken salad sandwich and took 1 home and 10oz water. Void x1    Goal: Teaching initiated upon admission  Outcome: Met This Shift  Patient verbalizes understanding to verbal information given on signs and symptoms of dehydration,action and possible side effects. Aware to call MD if develop complications. Problem: Discharge Planning  Intervention: Interaction with patient/family and care team  Provide discharge instructions. Goal: Knowledge of discharge instructions  Knowledge of discharge instructions     Outcome: Met This Shift  Verbalized understanding of discharge instructions, follow-up appointments, and when to call the physician. Problem: Infection - Central Venous Catheter-Associated Bloodstream Infection:  Intervention: Infection risk assessment  Discussed mediport maintenance, infection prevention, and when to call the physician. Labs drawn. Goal: Will show no infection signs and symptoms  Will show no infection signs and symptoms   Outcome: Met This Shift  Mediport site with no redness or warmth. Skin over port intact with no signs of breakdown noted. Patient verbalizes signs/symptoms of port infection and when to notify the physician. Comments: Care plan reviewed with patient and caregiver. Patient and caregiver verbalize understanding of the plan of care and contribute to goal setting.

## 2018-08-13 NOTE — PROGRESS NOTES
Labs drawn via central venous catheter, then patient escorted to exam room accompanied by Zeynep Blank

## 2018-08-13 NOTE — PROGRESS NOTES
Patient assessed for the following post hydration:    Dizziness   No  Lightheadedness  No     Acute nausea/vomiting No  Headache   No  Chest pain/pressure  No  Rash/itching   No  Shortness of breath  No    Patient tolerated . 9NS 500ml infusion over 2hours without any complications. Labs drawn per mediport. Last vital signs:   /76   Pulse 80   Temp 97.8 °F (36.6 °C) (Oral)   Resp 20   SpO2 100%     Patient instructed if experience any of the above symptoms following today's infusion,he/she is to notify MD immediately or go to the emergency department. Discharge instructions given to patient. Verbalizes understanding. Ambulated off unit per self with caregiver with belongings.

## 2018-08-13 NOTE — PROGRESS NOTES
signs/symptoms of infection. He denies chest pain, shortness of breath, abdominal pain, vomiting, bowel or urinary changes. Denies constipation. HPI   Past Medical History:   Diagnosis Date    Backache     [facility]) 2011    prostate    Goiter     Hyperlipidemia     Hypertension     Hypothyroidism     Incontinence of urine 2/28/2012    Osteoarthritis       Past Surgical History:   Procedure Laterality Date    COLONOSCOPY      MD COLSC FLX W/RMVL OF TUMOR POLYP LESION SNARE TQ N/A 11/14/2017    COLONOSCOPY POLYPECTOMY SNARE/COLD BIOPSY performed by Cassia Gunn MD at [facility]    MD ESOPHAGOGASTRODUODENOSCOPY TRANSORAL DIAGNOSTIC N/A 11/14/2017    EGD ESOPHAGOGASTRODUODENOSCOPY performed by Cassia Gunn MD at [address] [email or web address]    MD INSJ TUNNELED CTR VAD W/SUBQ PORT AGE 5 YR/> N/A 7/11/2018    SINGLE LUMEN MEDIPORT INSERTION performed by Alecia Stephens MD at [address]  2011    Dr. Quintana Major      Family History   Problem Relation Age of Onset    High Blood Pressure Mother     Diabetes Mother     Cancer Father         throat      Social History   Substance Use Topics    Smoking status: Former Smoker     Types: Cigarettes, Pipe, Cigars     Quit date: 10/4/1981    Smokeless tobacco: Never Used      Comment: social smoker    Alcohol use No      Current Outpatient Prescriptions   Medication Sig Dispense Refill    Magic Mouthwash (MIRACLE MOUTHWASH) Swish and spit 5 mLs 4 times daily as needed for Irritation Prepare mixture 1:1:1 DIPHENHYDRAMINE HCL,NYSTATIN,LIDOCAINE  mL 1    methadone (DOLOPHINE) 5 MG tablet Take 1 tablet by mouth every 6 hours as needed for Pain for up to 30 days. Kvng Jumper Date: 7/25/18 90 tablet 0    predniSONE (DELTASONE) 5 MG tablet Take 1 tablet by mouth 2 times daily 60 tablet 2    lidocaine-prilocaine (EMLA) 2.5-2.5 % cream Apply topically as needed. 30 g 1    Misc.  Devices (COMMODE BEDSIDE) MISC 1 Units by Does not apply route daily 1 each 0    dexamethasone (DECADRON) 4 MG tablet Take 2 tabs (8 mg) by mouth 2 times daily for three days beginning 1 day prior to scheduled DOCEtaxel administration. 12 tablet 2    vitamin B-6 (PYRIDOXINE) 100 MG tablet Take 100 mg by mouth daily      atenolol (TENORMIN) 25 MG tablet Take 25 mg by mouth daily      Cholecalciferol (VITAMIN D-3) 1000 units CAPS Take 1 capsule by mouth daily      acetaminophen (TYLENOL) 325 MG tablet Take 2 tablets by mouth every 4 hours as needed for Pain 120 tablet 3    Multiple Vitamins-Minerals (MENS MULTI VITAMIN & MINERAL PO) Take by mouth      levothyroxine (SYNTHROID) 100 MCG tablet Take 125 mcg by mouth Daily       pravastatin (PRAVACHOL) 40 MG tablet Take 40 mg by mouth daily.  ondansetron (ZOFRAN) 4 MG tablet Take 1 tablet by mouth every 8 hours as needed for Nausea or Vomiting 15 tablet 0     No current facility-administered medications for this visit. No Known Allergies   Health Maintenance   Topic Date Due    Hepatitis C screen  1950    DTaP/Tdap/Td vaccine (1 - Tdap) 10/25/1970    Lipid screen  10/25/1991    Shingles Vaccine (1 of 2 - 2 Dose Series) 10/25/2001    Pneumococcal high/highest risk (1 of 2 - PCV13) 10/25/2016    Flu vaccine (1) 09/01/2018    TSH testing  08/06/2019    Potassium monitoring  08/06/2019    Creatinine monitoring  08/06/2019    Colon cancer screen colonoscopy  11/14/2027    AAA screen  Completed        Review of Systems:   Review of Systems Noted in HPI   Objective:   Physical Exam   /73 (Site: Right Arm, Position: Sitting, Cuff Size: Medium Adult)   Pulse 69   Temp 97.9 °F (36.6 °C) (Oral)   Resp 20   Ht 6' 2.02\" (1.88 m)   Wt 143 lb 12.8 oz (65.2 kg)   SpO2 100%   BMI 18.45 kg/m²    General appearance: No apparent distress, chronically ill appearing, thin, and cooperative. HEENT: Pupils equal, round, and reactive to light. Conjunctivae/corneas clear.  Oral mucosa moist.   Neck: Supple, with full range of motion. No jugular venous distention. Trachea midline. Respiratory:  Normal respiratory effort. Clear to auscultation, bilaterally without Rales/Wheezes/Rhonchi. Cardiovascular: Regular rate and rhythm with normal S1/S2   Abdomen: Soft, non-tender to palpation, non-distended with active bowel sounds. Musculoskeletal: No clubbing, cyanosis bilaterally. Full range of motion without deformity. Trace to 1+ lower extremity edema. No calf tenderness. Ambulates in office without difficulty. Skin: Skin color, texture, turgor normal.  No rashes or lesions. Neurologic:  Neurovascularly intact without any focal sensory/motor deficits. Psychiatric: Alert and oriented  Peripheral Pulses: +2 palpable, equal bilaterally     Imaging Studies and Labs:   CBC:   Lab Results   Component Value Date    WBC 2.3 (L) 08/15/2018    HGB 7.5 (L) 08/15/2018    HCT 21.6 (L) 08/15/2018    MCV 84 08/15/2018     08/15/2018     BMP:   Lab Results   Component Value Date     08/15/2018     08/06/2018    K 3.4 08/15/2018    K 4.1 08/06/2018    K 3.3 07/21/2018    CL 97 08/06/2018    CO2 24 08/06/2018    BUN 19 08/06/2018    CREATININE 0.6 08/15/2018    CREATININE 0.6 08/06/2018    GLUCOSE 104 08/06/2018    GLUCOSE 92 10/08/2011    CALCIUM 8.0 08/06/2018      LFT:   Lab Results   Component Value Date    ALT 10 (L) 08/06/2018    AST 56 (H) 08/06/2018    ALKPHOS 241 (H) 08/06/2018    BILITOT 0.6 08/06/2018         Assessment and Plan: 1. Metastatic prostate cancer. The patient has progressed on two lines ADT with Casodex and recently XTANDI. His PSA almost doubles every month. The patient didn't have any imaging studies since January 2018. Therefore he had CT of the chest abdomen pelvis and bone scan, they confirmed extensive skeletal involvement and retroperitoneal lymphadenopathy.    Treatment for patients with disseminated prostate cancer that has progressed on androgen

## 2018-08-15 NOTE — PROGRESS NOTES
Patient assessed for the following post hydration:    Dizziness   No  Lightheadedness  No      Acute nausea/vomiting No  Headache   No  Chest pain/pressure  No  Rash/itching   No  Shortness of breath  No    Patient tolerated . 9NS 500ml infusion over 1hour without any complications. Patient tolerated xgeva subq injection and lupron IM injection well. Labs drawn per mediport. Last vital signs:   /69   Pulse 80   Temp 98.6 °F (37 °C) (Oral)   Resp 18   SpO2 99%     Patient instructed if experience any of the above symptoms following today's infusion and injections, he/she is to notify MD immediately or go to the emergency department. Discharge instructions given to patient. Verbalizes understanding. Ambulated off unit per self with caregiver with belongings.

## 2018-08-15 NOTE — PLAN OF CARE
Problem: Musculor/Skeletal Functional Status  Intervention: Fall precautions  Discussed fall precautions, fall risks, and instructed patient to ask for assistance with ambulation. Call light within reach. Goal: Absence of falls  Outcome: Met This Shift  Free from falls during infusion. Problem: Intellectual/Education/Knowledge Deficit  Intervention: Verbal/written education provided  lupron and xgeva reviewed. Patient verbalized understanding of medications being administered and potential side effects. Goal: Teaching initiated upon admission  Outcome: Met This Shift  Patient verbalizes understanding to verbal information given on lupron and xgeva,action and possible side effects. Aware to call MD if develop complications. Problem: Discharge Planning  Intervention: Interaction with patient/family and care team  Provide discharge instructions. Goal: Knowledge of discharge instructions  Knowledge of discharge instructions     Outcome: Met This Shift  Verbalized understanding of discharge instructions, follow-up appointments, and when to call the physician. Problem: Infection - Central Venous Catheter-Associated Bloodstream Infection:  Intervention: Infection risk assessment  Discussed mediport maintenance, infection prevention, and when to call the physician. Goal: Will show no infection signs and symptoms  Will show no infection signs and symptoms   Outcome: Met This Shift  Mediport site with no redness or warmth. Skin over port intact with no signs of breakdown noted. Patient verbalizes signs/symptoms of port infection and when to notify the physician. Comments: Care plan reviewed with patient and caregiver. Patient and caregiver verbalize understanding of the plan of care and contribute to goal setting.

## 2018-08-15 NOTE — PATIENT INSTRUCTIONS
1.  Lupron and Xgeva today  2.  500 MLS of normal saline today  3. RT chemo suite on Friday to check CBC and for possible blood transfusion  4. RTC to see me for labs and the next cycle of Taxotere in 1 week  5.   On RTC labs: CBC, BMP, LFTs, PSA

## 2018-08-15 NOTE — PROGRESS NOTES
Name: Bruno Sheppard  : 1950  MRN: [id number]    Oncology Navigation Follow-Up Note    Contact Type:  Medical Oncology- met prostate cancer    Subjective: See doctor & get my shots    Objective: Intermittent IVF Hydration in OP ONC  Reports \"tiredness\" with ambulation. Vomiting episode yesterday. Weight on 18:  139# 6.4 oz;  Weight Today: 140# 12.8 oz  Pain controlled with methadone  WBC 3.1  HGB 7.5  HCT 21.6       BUN 10  Crea 0.6  K+ 3.4     Assistance Needed: Neighbor assisting pt in care at home  [facility] supplying pt with nutritional supplements & RX meds    Referrals: N/A today    Education: Reinforced need to communicate when he is ill & vomiting so neighbor can administer anti-emetic. Encouraged pt to eat with his medications. Notes:  IVF Bolus for hydration given today. Dallie Pock administered today     Karthikeyan cont to follow & assist as needed.

## 2018-08-16 NOTE — PROGRESS NOTES
ill-defined increased activity in the skull, right frontal region.       Mottled increased activity on most of the ribs. Similar finding can be seen with the vertebral bodies particularly from the anterior posterior projection this is well seen in the lower lumbar bodies. Increased activity in the proximal femora bilaterally    in addition to more focal areas of increased activity in the femoral diaphyses findings are consistent with widespread prostatic disease which can be seen on the patient's recent CT scan abdomen pelvis. While in the hospital the patient received Urmila Pleasant and was started on Casodex. In November 2017 he underwent cystoscopy, no bladder abnormality was found. Sometime in December 2017, January 2018, he started Suresh-barre injection. From January till May 2018 he continued  Firmagon injections. However his PSA was increasing very quickly. His PSA doubled between March 9, 2018 and April 25, 2018. Most recent PSA on May 21, 2018 was 574. Sometime in May the patient was started on XTANDI. On 5/27/18 pt presented to [facility] ER for weakness and pain. Palliative chemotherapy with Taxotere started on July 12, 2018. Interim history on August 15, 2018: Today the patient reports that he had bad nausea and vomiting 2 nights ago. He was not able to eat a lot yesterday, but he drank a lot of fluid. His pain is well controlled. The patient is not constipated, he has bowel movements every other day. He denies having any fevers. Denies any any active bleeding.   He reports grade 1 fatigue, but denies any new shortness of breath and lightheadedness dizziness    HPI   Past Medical History:   Diagnosis Date    Backache     Cancer ([address] 2011    prostate    Goiter     Hyperlipidemia     Hypertension     Hypothyroidism     Incontinence of urine 2/28/2012    Osteoarthritis       Past Surgical History:   Procedure Laterality Date    COLONOSCOPY      NE COLSC FLX W/RMVL OF TUMOR POLYP LESION (VITAMIN D-3) 1000 units CAPS Take 1 capsule by mouth daily      acetaminophen (TYLENOL) 325 MG tablet Take 2 tablets by mouth every 4 hours as needed for Pain 120 tablet 3    Multiple Vitamins-Minerals (MENS MULTI VITAMIN & MINERAL PO) Take by mouth      levothyroxine (SYNTHROID) 100 MCG tablet Take 125 mcg by mouth Daily       pravastatin (PRAVACHOL) 40 MG tablet Take 40 mg by mouth daily. No current facility-administered medications for this visit. Facility-Administered Medications Ordered in Other Visits   Medication Dose Route Frequency Provider Last Rate Last Dose    sodium chloride flush 0.9 % injection 10 mL  10 mL Intercatheter PRN Rohan Sherman MD   10 mL at 08/15/18 1409    sodium chloride flush 0.9 % injection 20 mL  20 mL Intercatheter SREEKANTH Sherman MD   20 mL at 08/15/18 1046    heparin flush 100 UNIT/ML injection 500 Units  500 Units Intercatheter PRN Rohan Sherman MD   500 Units at 08/15/18 1409    sodium chloride flush 0.9 % injection 5 mL  5 mL Intravenous PRN Rohan Sherman MD        sodium chloride flush 0.9 % injection 10 mL  10 mL Intravenous PRN Rohan Sherman MD        heparin flush 100 UNIT/ML injection 500 Units  500 Units Intercatheter PRJESUS Sherman MD          No Known Allergies   Health Maintenance   Topic Date Due    Hepatitis C screen  1950    DTaP/Tdap/Td vaccine (1 - Tdap) 10/25/1970    Lipid screen  10/25/1991    Shingles Vaccine (1 of 2 - 2 Dose Series) 10/25/2001    Pneumococcal high/highest risk (1 of 2 - PCV13) 10/25/2016    Flu vaccine (1) 09/01/2018    TSH testing  08/06/2019    Potassium monitoring  08/06/2019    Creatinine monitoring  08/06/2019    Colon cancer screen colonoscopy  11/14/2027    AAA screen  Completed        Subjective:   Review of Systems   Constitutional: Positive for appetite change, fatigue and unexpected weight change. Negative for activity change and fever.    HENT: Negative for congestion, dental problem, facial swelling,

## 2018-08-17 NOTE — PROGRESS NOTES
Patient tolerated transfusion of 2 units PRBC's without any complications. Patient kept for 20 minutes observation post transfusion. Denies dizziness, lightheadedness, acute nausea or vomiting, headache, chest pain/pressure, rash/itching, or an increase in SOB. Last vital signs:   /70   Pulse 66   Temp 98.5 °F (36.9 °C) (Oral)   Resp 16 Comment: Lungs clear  Wt 145 lb 3.2 oz (65.9 kg)   SpO2 100%   BMI 18.63 kg/m²     Patient instructed if they experience any of the above symptoms following today's transfusion,he is to notify the physician immediately or go to the emergency department. Discharge instructions given to patient. Verbalizes understanding. Ambulated off unit in stable condition per self accompanied by CMA.

## 2018-08-17 NOTE — TELEPHONE ENCOUNTER
Navigation:     Karthikeyan received phone call from [facility] [place], RN CM, with inquiry about who referred pt to Dr. Karol Roth for the hernia. Karthikeyan outreach to Jul at [facility]. VM message left updating her that pt was referred to Dr. Karol Roth by Dr. Carline Russell, since he placed pt's mediport, & pt was experiencing pain related to the hernia, resulting in an ER visit. Pt is scheduled for hernia repair on 9-10-18.

## 2018-08-17 NOTE — PLAN OF CARE
Problem: Intellectual/Education/Knowledge Deficit  Intervention: Verbal/written education provided  Patient educated blood product transfusion protocol:    Patient receiving 2 units PRBC's:      - Blood product transfusion information sheet given: questions answered and consent signed  - Take vital signs/ monitor lungs sound prior to transfusion  - Monitor patient for 15 minutes after transfusion started  - Take vital signs / monitor lungs sound in 15 minutes and post transfusion  - Assess IV site   - Monitor patient closely for potential transfusion reaction    Call MD if develop complications once discharged. Goal: Teaching initiated upon admission  Outcome: Met This Shift  Patient verbalizes understanding to verbal information given on 2 units PRBC's, action and possible side effects. Aware to call MD if develop complications. Problem: Discharge Planning  Intervention: Interaction with patient/family and care team  Discuss understanding of discharge instructions,follow-up appointments, and when to call the physician. Goal: Knowledge of discharge instructions  Knowledge of discharge instructions     Outcome: Met This Shift  Verbalized understanding of discharge instructions, follow-up appointments, and when to call the physician. Problem: Infection - Central Venous Catheter-Associated Bloodstream Infection:  Intervention: Central line needs assessment  Mediport site with no redness or warmth. Skin over port site intact with no signs of breakdown noted. Patient verbalizes signs/symptoms of port infection and when to notify the physician. Goal: Will show no infection signs and symptoms  Will show no infection signs and symptoms   Outcome: Met This Shift  Instructed to monitor for signs/symptoms of infection at medi-port site and call MD if problems develop. Comments: Care plan reviewed with patient and caregiver.   Patient and caregiver verbalize understanding of the plan of care and contribute to goal setting.

## 2018-08-18 PROBLEM — K56.600 PARTIAL SMALL BOWEL OBSTRUCTION (HCC): Status: ACTIVE | Noted: 2018-01-01

## 2018-08-18 NOTE — ED PROVIDER NOTES
[facility]  eMERGENCY dEPARTMENT eNCOUnter          CHIEF COMPLAINT       Chief Complaint   Patient presents with    Abdominal Pain       Nurses Notes reviewed and I agree except as noted in the HPI. HISTORY OF PRESENT ILLNESS    Janet Mckeon is a 78 y.o. male who presents to the Emergency Department with a medical hist of prostate cancer with metastasis for the evaluation of abdominal pain. The patient reports to have been recently diagnosed with a right inguinal hernia and states that he has surgery scheduled on 9-10-18 with Dr. Waleska Fuchs (general surgery). He states to have gone to the cancer center yesterday where he was experiencing diarrhea. Last night, the patient states that he started to experience abdominal pain. He rates his current abdominal pain as a 10/10 in severity. The patient reports no radiating pain. He reports no relieving or modifying factors. He reports to have constipation. No additional symptoms or complaints at this time. The HPI was provided by the patient. REVIEW OF SYSTEMS     Review of Systems   Constitutional: Negative for appetite change, chills, fatigue and fever. HENT: Negative for congestion, ear pain, rhinorrhea and sore throat. Eyes: Negative for discharge, redness and visual disturbance. Respiratory: Negative for cough, shortness of breath and wheezing. Cardiovascular: Negative for chest pain, palpitations and leg swelling. Gastrointestinal: Positive for abdominal pain and constipation. Negative for diarrhea, nausea and vomiting. Genitourinary: Negative for decreased urine volume, difficulty urinating, dysuria and hematuria. Musculoskeletal: Negative for arthralgias, back pain, joint swelling and neck pain. Skin: Negative for pallor and rash. Allergic/Immunologic: Negative for environmental allergies. Neurological: Negative for dizziness, syncope, weakness, light-headedness, numbness and headaches.    Hematological: tablet Take 100 mg by mouth daily      atenolol (TENORMIN) 25 MG tablet Take 25 mg by mouth daily      Cholecalciferol (VITAMIN D-3) 1000 units CAPS Take 1 capsule by mouth daily      acetaminophen (TYLENOL) 325 MG tablet Take 2 tablets by mouth every 4 hours as needed for Pain  Qty: 120 tablet, Refills: 3      Multiple Vitamins-Minerals (MENS MULTI VITAMIN & MINERAL PO) Take by mouth      levothyroxine (SYNTHROID) 100 MCG tablet Take 125 mcg by mouth Daily       pravastatin (PRAVACHOL) 40 MG tablet Take 40 mg by mouth daily. ALLERGIES     has No Known Allergies. FAMILY HISTORY     indicated that his mother is . He indicated that his father is . He indicated that his sister is alive. He indicated that his brother is alive. family history includes Cancer in his father; Diabetes in his mother; High Blood Pressure in his mother. SOCIAL HISTORY      reports that he quit smoking about 36 years ago. His smoking use included Cigarettes, Pipe, and Cigars. He has never used smokeless tobacco. He reports that he does not drink alcohol or use drugs. PHYSICAL EXAM     INITIAL VITALS:  height is 6' 2\" (1.88 m) and weight is 143 lb 14.4 oz (65.3 kg). His oral temperature is 98.4 °F (36.9 °C). His blood pressure is 133/78 and his pulse is 68. His respiration is 16 and oxygen saturation is 99%. Physical Exam   Constitutional: He is oriented to person, place, and time. He appears well-developed and well-nourished. HENT:   Head: Normocephalic and atraumatic. Right Ear: External ear normal.   Left Ear: External ear normal.   Eyes: Conjunctivae are normal. Right eye exhibits no discharge. Left eye exhibits no discharge. No scleral icterus. Neck: Normal range of motion. Neck supple. No JVD present. Pulmonary/Chest: Effort normal. No stridor. No respiratory distress. Abdominal: Soft. He exhibits no distension. No hernia. Musculoskeletal: Normal range of motion. He exhibits no edema. other components within normal limits   TSH WITH REFLEX - Abnormal; Notable for the following:     TSH 0.140 (*)     All other components within normal limits   OSMOLALITY - Abnormal; Notable for the following:     Osmolality Calc 272.5 (*)     All other components within normal limits   T4, FREE - Abnormal; Notable for the following:     T4 Free 2.81 (*)     All other components within normal limits   LIPASE   ANION GAP   GLOMERULAR FILTRATION RATE, ESTIMATED       EMERGENCY DEPARTMENT COURSE:   Vitals:    Vitals:    08/18/18 1418 08/18/18 1508 08/18/18 1610 08/18/18 1647   BP:  124/83 125/80 133/78   Pulse:  74 70 68   Resp:  14 14 16   Temp: 98.3 °F (36.8 °C)   98.4 °F (36.9 °C)   TempSrc: Oral   Oral   SpO2:  97% 100% 99%   Weight:    143 lb 14.4 oz (65.3 kg)   Height:    6' 2\" (1.88 m)       2:02 PM: The patient was seen and evaluated. Labs and imaging were ordered and completed. The patient was given morphine while in the ED. MDM:  The patient was seen and evaluated within the ED today for the evaluation of abdominal pain. The patient presented in minimal acute distress secondary to his pain. Physical exam revealed no significant abnormalities or concerns. He presented with no signs of an inguinal or umbilical hernia. Lipase was 12.9. Remaining labs were reviewed. CT abdomen pelvis without contrast reveals prominent fluid-filled small bowel loops in the left hemiabdomen with minimal scattered free fluid in the abdomen and pelvis which may be due to a partial small bowel obstruction. The patient was treated with morphine while in the ED due to his presenting pain. I observed the patient's condition to remain stable during the duration of their stay. Due to the patient's imaging results and presenting symptoms I recommended admission and they were amenable to my decision. The case was discussed with Dr. Mary Frank (internal medicine) who graciously accepts the patient for admission and further evaluation. CRITICAL CARE:   None     CONSULTS:  Dr. Thanh San (internal medicine) - graciously accepts the patient for admission and further evaluation/work-up. PROCEDURES:  None     FINAL IMPRESSION      1. Partial small bowel obstruction [facility])          DISPOSITION/PLAN   Admission    PATIENT REFERRED TO:  Ranulfo Enrique, [address] [facility] 12[address] [facility] [address]  [phone]            DISCHARGE MEDICATIONS:  Current Discharge Medication List          (Please note that portions of this note were completed with a voice recognition program.  Efforts were made to edit the dictations but occasionally words are mis-transcribed.)    Scribe:  Blade Matamoros 8/18/18 2:02 PM Scribing for and in the presence of Carter Stanley DO. Signed by: Kathy Gonsales, 08/18/18 5:48 PM    Provider:  I personally performed the services described in the documentation, reviewed and edited the documentation which was dictated to the scribe in my presence, and it accurately records my words and actions.     Carter Stanley DO 8/18/18 5:48 PM        Carter Stanley DO  08/18/18 0603

## 2018-08-20 PROBLEM — E43 SEVERE MALNUTRITION (HCC): Chronic | Status: ACTIVE | Noted: 2018-01-01

## 2018-08-20 NOTE — CARE COORDINATION
18, 11:40 AM      Janet Jacindabob Mendoza Sr. Admitted from: ED 2018/ [address] day: 2   [facility] [place] Reason for admit: Partial small bowel obstruction ([address] [K56.600] Status: IP  Admit order signed?: yes  PMH:  has a past medical history of Backache; Cancer ([address] Goiter; Hyperlipidemia; Hypertension; Hypothyroidism; Incontinence of urine; and Osteoarthritis. Procedure: none  Pertinent abnormal Imagin/18 CT abdomen/pelvis  Prominent fluid-filled small bowel loops in the left hemiabdomen with minimal scattered free fluid in the abdomen and pelvis. Findings may be secondary to partial small bowel obstruction. 2. Bilateral renal pelviectasis. 3. Retroperitoneal lymphadenopathy highly suspicious for metastatic disease. 4. Extensive osseous metastatic disease. Medications:  Scheduled Meds:   polyethylene glycol  17 g Oral Daily    vitamin B-6  100 mg Oral Daily    predniSONE  5 mg Oral BID    pravastatin  40 mg Oral Nightly    therapeutic multivitamin-minerals  1 tablet Oral Daily    levothyroxine  100 mcg Oral Daily    vitamin D  1,000 Units Oral Daily    atenolol  25 mg Oral Daily    sodium chloride flush  10 mL Intravenous 2 times per day    enoxaparin  40 mg Subcutaneous Daily     Continuous Infusions:   Pertinent Info/Orders/Treatment Plan:  Hospitalist following. Diet advanced. K+ 3.4. Prednisone. No nausea. If nausea develops will consider a NGT and surgery consult per hospitalist note. Patient is set to have a hernia repair in near future with Dr. Verito Alfaro. Diet: DIET GENERAL;  Dietary Nutrition Supplements: Frozen Oral Supplement   DVT Prophylaxis: Lovenox  Smoking status:  reports that he quit smoking about 36 years ago. His smoking use included Cigarettes, Pipe, and Cigars.  He has never used smokeless tobacco.   Influenza Vaccination Screening Completed: n/a  Pneumonia Vaccination Screening Completed: yes  PCP: Figueroa Charles DO  Readmission: Patient has been readmitted within 27 days. Patient went to f/u appointment? yes  If yes, was it within 7 days? Yes, 3 days  Patient was able to fill prescriptions? yes  Patient is taking medications as prescribed? yes  Cause for readmission? SBO    Readmission Risk Score: 36%    Discharge Planning  Current Residence:  Private Residence  Living Arrangements:  Alone   Support Systems:  Friends/Neighbors  Current Services PTA:     Potential Assistance Needed:  N/A  Potential Assistance Purchasing Medications:  No  Does patient want to participate in local refill/ meds to beds program?  Yes  Type of Home Care Services:  None  Patient expects to be discharged to:  Home  Expected Discharge date:  08/27/18  Follow Up Appointment: Best Day/ Time: Tuesday PM    Discharge Plan: Spoke with patient declines HH. Reports he has neighbors that assist him if needed. He does not wish to transfer to [address] [place] [address] with primary RN, possible discharge this evening.       Evaluation: no

## 2018-08-20 NOTE — FLOWSHEET NOTE
08/20/18 1455   Provider Notification   Reason for Communication Evaluate   Provider Name [facility]   Provider Notification Physician   Method of Communication Secure Message   Response Waiting for response   Notification Time [phone]     Patient did well with general diet for lunch. No pain/nausea. Had 1 BM this shift.

## 2018-08-22 NOTE — PROGRESS NOTES
Discharge education given to patient and caregiver Jocelyn Astorga. Accepting of plan of care and education. Patient discharged home.
Navigation:     Late Entry-  Karthikeyan received phone call from pt neighbor/caregiver, Nav Eden, who shared pt hospitalized with a partial bowel obstruction. Karthikeyan met with pt on 5k. Pt reports his bowels had started moving after being hospitalized. Karthikeyan reviews the need for stool softener, with his pain medication. Dr. Albert Weeks, hospitalist, with pt during Karthikeyan visit. He shares that pt initially presented with what appeared to be a partial bowel obstruction, but has since resolved. Pt is stooling, & was being discharged home on 8/20/18. Pt verbalized, \"feeling good\". Karthikeyan updates pt that he is scheduled with Dr. Martha Marrero, PCP at [facility] clinic on Thursday 8/23/18 for authorization for the hernia repair. Karthikeyan confirmed with Nav Eden- caregiver, the scheduled appt.     Karthikeyan shared the scheduled appt with pt's primary RN for the AVS.
Noted large amount of water on floor. Patient had no water to spill. IV tubing dry. Maintenance called. Patient moved to 5k17.
-28% in 7 months per chart review. Rx includes: Glycolax, Prednisone, MVI w/minerals, Vitamin B-6 & D.  · Wound Type:  (healing ulcers on buttocks)  · Current Nutrition Therapies:  · Oral Diet Orders: Full Liquid   · Oral Diet intake: 51-75%, %  · Oral Nutrition Supplement (ONS) Orders: Frozen Oral Supplement  · ONS intake:  (Initiated today)  · Anthropometric Measures:  · Ht: 6' 2\" (188 cm)   · Current Body Wt: 143 lb 14.4 oz (65.3 kg) (8/18/18; +2-3 pitting in BLE)  · Admission Body Wt: 143 lb 14.4 oz (65.3 kg) (8/18/18; +2-3 pitting in BLE)  · Usual Body Wt: 200 lb (90.7 kg) (per pt report; 199 lb 3.2 oz on 1/8/18; 186 lb 4.8 oz on 3/27/18; 155 lb 12.8 oz on 6/6/18)  · % Weight Change: -28% weight loss,  in 7 months per EMR; +2-3 pitting edema at present  · Ideal Body Wt: 190 lb (86.2 kg), % Ideal Body 75%  · BMI Classification: BMI 18.5 - 24.9 Normal Weight (18.5)  · Comparative Standards (Estimated Nutrition Needs):  · Estimated Daily Total Kcal: 4589-9890 kcal/day  · Estimated Daily Protein (g): 85-98 g/day    Estimated Intake vs Estimated Needs: Intake Improving (slightly)    Nutrition Risk Level: Moderate    Nutrition Interventions:   Continue current diet, Start ONS, Vitamin Supplement (Started Magic Cup TID. Continue Multivitamin w/minerals daily.)  Continued Inpatient Monitoring, Education Initiated (Encouraged po intake of meals during LOS)    Nutrition Evaluation:   · Evaluation: Goals set   · Goals: Pt will consume 75% or more of meals during LOS    · Monitoring: Meal Intake, Supplement Intake, Diet Tolerance, Fluid Balance, Ascites/Edema, Weight, Pertinent Labs, Nausea or Vomiting, Diarrhea, Comparative Standards    See Adult Nutrition Doc Flowsheet for more detail.      Electronically signed by David Steven RD, LD on 8/20/18 at 9:58 AM    Contact Number: [phone]

## 2018-08-22 NOTE — PROGRESS NOTES
Name: Alia Berrios  : 1950  MRN: [id number]    Oncology Navigation Follow-Up Note    Contact Type:  Medical Oncology- metastatic prostate cancer    Subjective: here to see Dr. Jake Toscano & get my treatment    Objective:  WBC 4.7  HGB 9.2  Plt 165  BUN 17  Crea 0.5  Cont with having bowel movements; last BM yesterday. Using miralax & stool softeners daily. Weight 143# 12.8 oz today. Due for Chemo:  Taxotere cycle #3 today    Assistance Needed: [facility]al supplements [facility] by [facility]    Referrals: N/A today    Education: Reiterated cont need for monitoring bowel movements, & nutritional intake. Notes: CYCLE #3  Taxotere given today. Denies needs today. Karthikeyan following to assist as needed. Appt with PCP tomorrow & [facility] with [facility] for pt hernia repair scheduled in September.

## 2018-08-22 NOTE — PLAN OF CARE
Problem: Intellectual/Education/Knowledge Deficit  Intervention: Verbal/written education provided  Chemotherapy Teaching     What is Chemotherapy   Drug action [x]   Method of Administration [x]   Handouts given []     Side Effects  Nausea/vomiting [x]   Diarrhea [x]   Fatigue [x]   Signs / Symptoms of infection [x]   Neutropenia [x]   Thrombocytopenia [x]   Alopecia [x]   neuropathy [x]   Triangle diet &  the importance of fluids [x]       Micellaneous  Importance of nutrition [x]   Importance of oral hygiene [x]   When to call the MD [x]   Monitoring labs [x]   Use of supportive services []     Explanation of Drug Regimen / Frequency  taxotere     Comments  Verbalized understanding to drug,action,side effects and when to call MD       Goal: Teaching initiated upon admission  Outcome: Met This Shift  Patient verbalizes understanding to verbal information given on taxotere,action and possible side effects. Aware to call MD if develop complications. Problem: Discharge Planning  Intervention: Interaction with patient/family and care team  Provide discharge instructions. Goal: Knowledge of discharge instructions  Knowledge of discharge instructions     Outcome: Met This Shift  Verbalized understanding of discharge instructions, follow-up appointments, and when to call the physician. Problem: Infection - Central Venous Catheter-Associated Bloodstream Infection:  Intervention: Infection risk assessment  Discussed mediport maintenance, infection prevention, and when to call the physician. Labs drawn. Goal: Will show no infection signs and symptoms  Will show no infection signs and symptoms   Outcome: Met This Shift  Mediport site with no redness or warmth. Skin over port intact with no signs of breakdown noted. Patient verbalizes signs/symptoms of port infection and when to notify the physician.      Problem: Musculor/Skeletal Functional Status  Intervention: Fall precautions  Discussed fall precautions, fall risks, and instructed patient to ask for assistance with ambulation. Call light within reach. Goal: Absence of falls  Outcome: Met This Shift  Free from falls during infusion. Comments: Care plan reviewed with patient and caregiver. Patient and caregiver verbalize understanding of the plan of care and contribute to goal setting.

## 2018-08-22 NOTE — PROGRESS NOTES
Patient assessed for the following post chemotherapy:    Dizziness   No  Lightheadedness  No      Acute nausea/vomiting No  Headache   No  Chest pain/pressure  No  Rash/itching   No  Shortness of breath  No    Patient kept for 20 minutes observation post infusion chemotherapy. Patient tolerated chemotherapy treatment taxotere without any complications. Labs drawn per mediport. Last vital signs:   /75   Pulse 84   Temp 98.5 °F (36.9 °C) (Oral)   Resp 16   Ht 6' 2\" (1.88 m)   Wt 143 lb 12.8 oz (65.2 kg)   SpO2 99%   BMI 18.46 kg/m²     Patient instructed if experience any of the above symptoms following today's infusion,he/she is to notify MD immediately or go to the emergency department. Discharge instructions given to patient. Verbalizes understanding. Ambulated off unit per self with caregiver with belongings.

## 2018-08-22 NOTE — ONCOLOGY
Chemotherapy Administration    Pre-assessment Data: Antineoplastic Agents  Other:   See toxicity flow sheet for assessment [x]     Physician Notification of Concerns Related to Chemotherapy Administration:   Physician Notified Matt Amas / Time of Notification Dr Vivek Mccoy seen today.      Interventions:   Lab work assessed  [x]   Height / Weight verified for dose [x]   Current MAR reviewed [x]   Emergency drugs available as appropriate [x]   Anaphylaxis assessment completed [x]   Pre-medications administered as ordered [x]   Blood return noted upon initiation of chemotherapy [x]   Blood return noted each 1-2ml of a vesicant medication if given IV push []   Blood return noted each 2-3ml of a non-vesicant medication if given IV push []   Monitor for signs / symptoms of hypersensitivity reaction [x]   Chemotherapy orders (drug/dose/rate) verified by 2 Chemo certified RNs [x]   Monitor IV site and blood return throughout the infusion of the medication [x]   Document IV site checks on the IV assessment form [x]   Document chemotherapy teaching on the Patient Education tab [x]   Document patient verbalizes understanding of medications being administered [x]   If IV infiltration, see ONS Guidelines []   Other:   taxotere   [x]

## 2018-08-23 NOTE — PROGRESS NOTES
[address] PROFESSIONAL SERVS  [facility]  [place] [place] [address]  [address] [address]  Dept: [phone]  Dept Fax: [phone]  Loc: [phone]     Visit Date: 8/22/2018     Judson Valentine Sr. is a 78 y.o. male who presents today for:   Chief Complaint   Patient presents with    Follow-up     Prostate Cancer        HPI:  This is a 79 yo AAM diagnosed with prostate cancer in 2011. He underwent RALRP in June 2011 that showed 2 cm moderately differentiated adenocarcinoma, Donis score 7 (3+4), all margins were negative, there was no involvement of seminal vesicles, there was evidence of perineural involvement. His surgery was complicated by incontinence and erectile dysfunction. His 5 months PSA was up from 0.07 after surgery to 0.23. Per Dr. Sanjeev Alexander note the patient did receive delayed RTC sometime in 2014, however there is no Rad Onc note in Epic to confirm that. His PSA was slowly but steadily rising from 2011. In 11/2015 it was 4.98, then in February 2016 it decrease to 0.84. Sometime in 2014/2015 he was started on Firmagon injections due to rising PSA, then the patient was lost to follow up between February 2016 and November 2017 when his PSA was 646.7. On November 10, 2017 the patient presented to ED with constipation and weight loss of a 2 month. Imaging studies showed :  diffuse osseous abnormalities. There is extensive heterogeneous appearance of the osseous framework throughout all structures. This is a significant interval change since the prior exam, and is consistent with diffuse osseous metastatic lesions    until proven otherwise. No evidence of fracture.       Soft tissues are nonspecific. No convincing pathologically enlarged femoral lymph nodes though the femoral lymph nodes are slightly prominent, short axis diameters of approximately 8-9 mm. Bone scan showed :  Extensive increased activity throughout the axial and appendicular skeleton.  There is irregular lightheadedness dizziness    HPI   Past Medical History:   Diagnosis Date    Backache     Cancer [facility]) 2011    prostate    Goiter     Hyperlipidemia     Hypertension     Hypothyroidism     Incontinence of urine 2/28/2012    Osteoarthritis       Past Surgical History:   Procedure Laterality Date    COLONOSCOPY      SD COLSC FLX W/RMVL OF TUMOR POLYP LESION SNARE TQ N/A 11/14/2017    COLONOSCOPY POLYPECTOMY SNARE/COLD BIOPSY performed by John Bhat MD at [facility]    SD ESOPHAGOGASTRODUODENOSCOPY TRANSORAL DIAGNOSTIC N/A 11/14/2017    EGD ESOPHAGOGASTRODUODENOSCOPY performed by John Bhat MD at [facility]    SD INSJ TUNNELED CTR VAD W/SUBQ PORT AGE 5 YR/> N/A 7/11/2018    SINGLE LUMEN MEDIPORT INSERTION performed by Elizabeth Leroy MD at [facility]  2011    Dr. Mary Ann Baxter      Family History   Problem Relation Age of Onset    High Blood Pressure Mother     Diabetes Mother     Cancer Father         throat      Social History   Substance Use Topics    Smoking status: Former Smoker     Types: Cigarettes, Pipe, Cigars     Quit date: 10/4/1981    Smokeless tobacco: Never Used      Comment: social smoker    Alcohol use No      Current Outpatient Prescriptions   Medication Sig Dispense Refill    levothyroxine (SYNTHROID) 100 MCG tablet Take 1 tablet by mouth Daily 30 tablet 0    ondansetron (ZOFRAN) 4 MG tablet Take 1 tablet by mouth every 8 hours as needed for Nausea or Vomiting 15 tablet 0    Magic Mouthwash (MIRACLE MOUTHWASH) Swish and spit 5 mLs 4 times daily as needed for Irritation Prepare mixture 1:1:1 DIPHENHYDRAMINE HCL,NYSTATIN,LIDOCAINE  mL 1    methadone (DOLOPHINE) 5 MG tablet Take 1 tablet by mouth every 6 hours as needed for Pain for up to 30 days. Terry Burns Date: 7/25/18 90 tablet 0    predniSONE (DELTASONE) 5 MG tablet Take 1 tablet by mouth 2 times daily 60 tablet 2    lidocaine-prilocaine (EMLA) 2.5-2.5 % oriented to person, place, and time. He has normal reflexes. No cranial nerve deficit. He exhibits normal muscle tone. Skin: Skin is warm. No rash noted. No erythema. Psychiatric: He has a normal mood and affect. His behavior is normal. Judgment and thought content normal.   Vitals reviewed. /73 (Site: Right Arm, Position: Sitting, Cuff Size: Large Adult)   Pulse 79   Temp 98.1 °F (36.7 °C) (Oral)   Resp 16   Ht 6' 2\" (1.88 m)   Wt 143 lb 12.8 oz (65.2 kg)   SpO2 98%   BMI 18.46 kg/m²      Imaging studies and labs:   CBC:   Lab Results   Component Value Date    WBC 4.7 (L) 08/22/2018    HGB 9.2 (L) 08/22/2018    HCT 26.5 (L) 08/22/2018    MCV 86 08/22/2018     08/22/2018       Chemistry        Component Value Date/Time     (L) 08/22/2018 1018     08/19/2018 0511    K 4.0 08/22/2018 1018    K 4.0 08/20/2018 0723    K 3.4 (L) 08/19/2018 0511     08/19/2018 0511    CO2 23 08/19/2018 0511    BUN 6 (L) 08/19/2018 0511    CREATININE 0.5 08/22/2018 1018    CREATININE 0.4 08/19/2018 0511        Component Value Date/Time    CALCIUM 7.6 (L) 08/19/2018 0511    ALKPHOS 202 (H) 08/18/2018 1410    AST 44 (H) 08/18/2018 1410    ALT 9 (L) 08/18/2018 1410    BILITOT 0.5 08/18/2018 1410          PT/INR:   No results for input(s): PROTIME, INR in the last 72 hours. APTT:   No results for input(s): APTT in the last 72 hours. PSA:  February 2018: 128.2  March 2018: 132  April 2018: 361  May 2018: 574  June 2018: 949    CT of the abdomen and pelvis on June 29, 2018 showed:  1. No acute intrathoracic abdominal or intrapelvic findings. 2. Retroperitoneal lymphadenopathy highly suspicious for metastatic disease. 3. Extensive osseous metastatic disease. CT of the chest on June 29, 2018 showed:  1. No acute intrathoracic findings. 2. Extensive osseous metastatic disease. Bone scan on June 29, 2000 818 showed:  1.  Foci of abnormal radiotracer accumulation throughout the visualized skeleton consistent with diffuse osseous metastatic disease and stable compared to the prior examination. 2. Probable degenerative arthropathic changes as detailed above       Assessment/Plan:        Diagnosis Orders   1. Prostate cancer ([facility] [address]  PSA, Prostatic Specific Antigen    CT Chest W Contrast    CT ABDOMEN PELVIS W IV CONTRAST Additional Contrast? None    NM BONE SCAN WHOLE BODY    DISCONTINUED: 0.9 % sodium chloride infusion    DISCONTINUED: dexamethasone (DECADRON) 12 mg in sodium chloride 0.9 % IVPB    DISCONTINUED: DOCEtaxel (TAXOTERE) 137 mg in sodium chloride 0.9 % 250 mL chemo IVPB   2. Cancer, metastatic to bone [facility])  CT Chest W Contrast    CT ABDOMEN PELVIS W IV CONTRAST Additional Contrast? None    NM BONE SCAN WHOLE BODY   3. Cancer related pain     4. Oral mucositis (ulcerative) due to antineoplastic therapy     5. Anemia, unspecified type            1.Metastatic prostate cancer. The patient has progressed on two lines ADT with Casodex and recently XTANDI. His PSA almost doubles every month. The patient didn't have any imaging studies since January 2018. Therefore he had CT of the chest abdomen pelvis and bone scan, they confirmed extensive skeletal involvement and retroperitoneal lymphadenopathy. Treatment for patients with disseminated prostate cancer that has progressed on androgen deprivation therapy and abiraterone is Taxotere chemotherapy in combination with prednisone that has been shown to significantly prolong overall survival. The  potential toxicities associated with Taxotere (myelosuppression, febrile infections, neuropathy) might be difficult to monitor in the patient who was noncompliant in the past.  Currently he has very good social support from his neighbor Mrs. Henderson. Started palliative chemotherapy with Taxotere on July 12, 2008  2. Palliative chemotherapy with Taxotere. Second cycle given on August 1, 2018. He continues a prednisone twice a day.  He is

## 2018-08-29 NOTE — PROGRESS NOTES
Patient tolerated lab draw via medi-port without any complications. Discharge instructions given to patient-verbalizes understanding. Ambulated off unit per self with belongings accompanied by caregiver.

## 2018-08-29 NOTE — PROGRESS NOTES
non-distended with active bowel sounds. Musculoskeletal: 2-3+ pitting edema of left lower extremity, 1-2+ edema of right lower extremity. No calf tenderness with palpation bilaterally. No warmth or erythema. Skin: Skin color, texture, turgor normal.  No rashes or lesions. Neurologic:  Neurovascularly intact without any focal sensory/motor deficits. Psychiatric: Alert and oriented    Imaging Studies and Labs:   CBC:   Lab Results   Component Value Date    WBC 4.7 (L) 08/22/2018    HGB 9.2 (L) 08/22/2018    HCT 26.5 (L) 08/22/2018    MCV 86 08/22/2018     08/22/2018     BMP:   Lab Results   Component Value Date     08/22/2018     08/19/2018    K 4.0 08/22/2018    K 4.0 08/20/2018    K 3.4 08/19/2018     08/19/2018    CO2 23 08/19/2018    BUN 6 08/19/2018    CREATININE 0.5 08/22/2018    CREATININE 0.4 08/19/2018    GLUCOSE 72 08/19/2018    GLUCOSE 92 10/08/2011    CALCIUM 7.6 08/19/2018      LFT:   Lab Results   Component Value Date    ALT 9 (L) 08/18/2018    AST 44 (H) 08/18/2018    ALKPHOS 202 (H) 08/18/2018    BILITOT 0.5 08/18/2018         Assessment and Plan:   1. Metastatic Prostate Cancer   Per Dr. Narayan Welch: The patient has progressed on two lines ADT with Casodex and recently XTANDI. His PSA almost doubles every month. The patient didn't have any imaging studies since January 2018. Therefore he had CT of the chest abdomen pelvis and bone scan, they confirmed extensive skeletal involvement and retroperitoneal lymphadenopathy.    Treatment for patients with disseminated prostate cancer that has progressed on androgen deprivation therapy and abiraterone is Taxotere chemotherapy in combination with prednisone that has been shown to significantly prolong overall survival. The  potential toxicities associated with Taxotere (myelosuppression, febrile infections, neuropathy) might be difficult to monitor in the patient who was noncompliant in the past.  Currently he has very good social support from his neighbor Mrs. Henderson. Started palliative chemotherapy with Taxotere on July 12, 2008  2. Palliative Chemotherapy with Taxotere  He received first cycle of Taxotere on July 12, 2018, second cycle on 8/1/18, third cycle on 8/22/18. He continues Prednisone twice daily. He is also on Xgeva and Lupron. 3. Cancer Related Pain  Well controlled with Methadone. Patient takes 1-2 tablets daily. 4. Normocytic Anemia   Patient received last blood transfusion on 8/17/18. Hgb 8.5 on 8/29/18. No transfusion needed today. 5. Nausea  Well controlled with Zofran. Refill given today. Patient will have restaging studies of CT of chest, CT of abdomen/pelvis, bone scan on 9/6/18. Follow up with Dr. Gi Zafar on 9/12/18 for next cycle of chemotherapy. All patient questions answered. Pt voiced understanding. Patient agreed with treatment plan. Patient assessment and plan reviewed with Dr. Gi Zafar. Follow up as directed. Patient instructed to call for questions or concerns.       Electronically signed by   Lizet Lucio PA-C

## 2018-08-29 NOTE — PLAN OF CARE
Problem: Discharge Planning  Intervention: Interaction with patient/family and care team  Verbalize understanding of discharge instructions, follow up appointments, and when to call Physician. Goal: Knowledge of discharge instructions  Knowledge of discharge instructions     Outcome: Met This Shift  Discuss understanding of discharge instructions, follow up appointments and when to call Physician. Problem: Infection - Central Venous Catheter-Associated Bloodstream Infection:  Intervention: Central line needs assessment  Mediport site with no redness or warmth. Skin over port site intact with no signs of breakdown noted. Patient verbalizes signs/symptoms of port infection and when to notify the physician. Goal: Will show no infection signs and symptoms  Will show no infection signs and symptoms   Outcome: Met This Shift  Instructed to monitor for signs/symptoms of infection at medi-port site and call MD if problems develop. Comments: Care plan reviewed with patient and caregiver. Patient and caregiver verbalize understanding of the plan of care and contribute to goal setting.

## 2018-09-05 NOTE — TELEPHONE ENCOUNTER
Navigation:     Karthikeyan received phone call from Jocelyn Astorga, pt caregiver/neighbor. She shares concern that pt left LE has increased with swelling, from foot to past pt lt knee. Swelling has engulfed pt's blue nuria pant leg. Pt denies pain in the leg. Karthikeyan updated Dr. Dru Blanca; await ONC direction. Pt is currently scheduled for CT ABD & Bone Scan tomorrow. Karthikeyan will update pt caregiver, Jocelyn Astorga when ONC provides direction.

## 2018-09-06 NOTE — TELEPHONE ENCOUNTER
Pt is scheduled for right inguinal hernia repair with Dr. William Gibson 9/10. Is it ok to proceed with surgery considering recent labs and CT scans? Dr. William Gibson would like to give pt 2 units PRBC on the day of surgery. Please advise.

## 2018-09-07 NOTE — TELEPHONE ENCOUNTER
Dr. Chantel Lebron talked to Dr. Kelly Reyes nurse. The patient had restaging CT scans and  abdominal CT scan showed new metastatic liver lesions. Surgery is on hold.

## 2018-09-10 NOTE — TELEPHONE ENCOUNTER
Navigation:     Pt caregiver, Gloria Appiah phoned Karthikeyan with query why pt hernia surgery was cancelled. Karthikeyan notified by Dr. Jeanne Arellano that pt surgery was postponed d/t pt CT scan findings. Pt & caregiver have not received the scan results to date. Pt is scheduled with Dr. Jeanne Arellano on 9-12-18. Karthikeyan updated pt caregiver that ONC conferred with Dr. Araseli Crews, & decision was made to postpone hernia surgery at present, to allow Dr. Jeanne Arellano to obtain Bone Scan results, along with CT ABD results & review with pt. Caregiver reports pt has been wearing ZAINAB hose which has seemed to help the leg swelling. Pt appetite is good, with pt eating 3 meals per day, at the caregiver's home, with she & her spouse, along with nutritional supplements. Karthikeyan cont to follow pt care closely & assisting as needed.

## 2018-09-12 NOTE — PATIENT INSTRUCTIONS
1.  No Taxotere treatments today  2. Patient will receive Lupron and Irena Gt today  3. Transfusion of 1 unit of PRBCs today  3. MRI of the abdomen next week  4. RTC 1-2 days after MRI  Patient Education          cabazitaxel  Pronunciation:  kelle ELDRIDGE i TAX el  Brand:  Oscar Stein  What is the most important information I should know about cabazitaxel? You should not use cabazitaxel if you have severe liver disease, low white blood cell counts, or an allergy to any medicine that contains polysorbate 80. Cabazitaxel can weaken (suppress) your immune system, and you may get an infection or bleed more easily. Cabazitaxel can also cause serious or fatal side effects on your lungs, kidneys, or stomach and intestines. Call your doctor right away if you have symptoms such as: fever, cough, body aches, easy bruising or bleeding, burning when you urinate, decreased urination, swelling in your hands or feet, severe stomach pain, constipation, blood in your urine or stools, or sudden chest pain and shortness of breath. A serious allergic reaction may occur during the injection. Tell your caregivers right away if you have a rash or skin redness, chest tightness or trouble breathing, or if you feel like you might pass out. What is cabazitaxel? Cabazitaxel is a cancer medicine that interferes with the growth and spread of cancer cells in the body. Cabazitaxel is used together with prednisone to treat prostate cancer that has spread to other parts of the body (metastatic). Cabazitaxel is usually given after other cancer medicines have been tried without success. Cabazitaxel may also be used for purposes not listed in this medication guide. What should I discuss with my health care provider before receiving cabazitaxel? You should not use this medicine if you are allergic to cabazitaxel, or if you have:  · severe liver disease;  · low white blood cell counts; or  · an allergy to any medicine that contains polysorbate 80.   To make sure cabazitaxel is safe for you, tell your doctor if you have ever had:  · radiation treatment;  · liver or kidney disease;  · breathing problems;  · stomach or intestinal bleeding; or  · a severe allergic reaction to any medication. Although not for use by women, cabazitaxel can cause birth defects in a baby if the mother or the father is using this medicine. If you are receiving cabazitaxel and your sexual partner could become pregnant, use a condom or other form of birth control to prevent pregnancy during treatment. Keep using birth control for at least 3 months after your last dose of cabazitaxel. Tell your doctor at once if a pregnancy occurs during this time. It is not known whether cabazitaxel passes into breast milk or if it could harm a nursing baby. You should not breast-feed while using this medicine. How is cabazitaxel given? Cabazitaxel is injected into a vein through an IV. A healthcare provider will give you this injection. This medicine must be given slowly, and the infusion can take about 1 hour to complete. Cabazitaxel is usually given once every 3 weeks. You will be given other medications to prevent certain side effects. Cabazitaxel is used together with prednisone (a steroid medicine). You will most likely take prednisone by mouth every day throughout your cabazitaxel treatment. You may also be given medications to prevent nausea and vomiting or infections. Follow your doctor's dosing instructions very carefully. Do not stop taking prednisone without your doctor's advice, or you could have unpleasant side effects caused by cabazitaxel. Tell your doctor if you have missed any doses or have stopped taking prednisone for any reason. Use all medications as directed by your doctor. Read the medication guide or patient instructions provided with each medication. Do not change your doses or medication schedule without your doctor's advice.   Call your doctor if you have ongoing vomiting

## 2018-09-12 NOTE — PROGRESS NOTES
Name: Ivana Buerger  : 1950  MRN: [id number]    Oncology Navigation Follow-Up Note    Contact Type:  Medical Oncology- prostate    Subjective: \"here to see Dr. DALE [facility], get scan results, & clearance for hernia surgery\". Objective: +appetite->20+# weight gain. Eating 3 meals per day, provided by caregiver/neighbor, & drinking 2 supplements/day. Cont with decadron. DENIES N/V  +Pain increase in lower back, pt walking hunched over. Only taking Methadone 2 tabs per day. +Left Leg swelling past knee->been wearing ZAINAB hose to knee. +Incontinence->wears depends, however there is notable urine smell. Karthikeyan encouraged pt to change depends more frequently. [place] +Loose stooling intermittently, 2-3x per day. HGB 7.6 today. Bone Scan 18 reveals:  1. Stable foci of abnormal radiotracer accumulation in the visualized skeleton consistent with diffuse osseous metastatic disease. 2. Probable degenerative arthropathic changes as detailed above.         CT Chest/ABD 18 reveals:   1. Questionable cystitis. Findings of constipation. 2. Mild retroperitoneal adenopathy, unchanged from prior study, suspicious for metastatic disease. 3. dilatation left renal pelvis. Cannot exclude element of UPJ stenosis. 4. Multiple tiny low-density foci in the liver. Period findings highly suggestive of metastatic disease. 5. Extensive skeletal metastatic disease, not appreciably changed from prior study. Assistance Needed: Cont receiving meds through [place], depends through [place], nutritional supplements through [place]. Referrals: Previous referral to Palliative Care    Education: ONC explained diagnostic imaging suggests disease progression. Query to pt if he desires to cont with tx. Pt verbalized, \"do whatever you want to do; just trying to keep me alive\". Notes: NO CHEMO today. DUE for [address]  1 UNIT PRC ordered for transfusion. MRI ABD to clarify CT findings of liver lesions.   Thigh High ZAINAB open toe hose ordered. Increase methadone use to 3x per day & eval for effectiveness; Methadone is ordered every 6 hrs at present, so dosing can increase to 4x/day, if needed. Caregiver voices understanding. Karthikeyan outreach to Jul at [facility] her on the above. \   MRI order, ZAINAB [employer] faxed to [facility]: [phone]  Jul, [address] queries about pt hygiene. Shared with her the urine odor. Jul ordering 160 depends for delivery with 10 refills->pt to call when needing more. Info shared with pt & his caregiver, Tanika Humphries. Karthikeyan following to assist as needed.

## 2018-09-12 NOTE — PLAN OF CARE
Problem: Pain:  Intervention: Opioid analgesia side-effects  Patient encouraged to take pain med as prescribed and to call the physician if pain is uncontrolled. Goal: Control of chronic pain  Control of chronic pain   Outcome: Ongoing  Patient rates chronic leg pain @ \"7\" upon admission and @ \"5\" @ discharge. Problem: Discharge Planning  Intervention: Interaction with patient/family and care team  Provide discharge instructions. Goal: Knowledge of discharge instructions  Knowledge of discharge instructions     Outcome: Met This Shift  Verbalized understanding of discharge instructions, follow-up appointments, and when to call the physician. Problem: Infection - Central Venous Catheter-Associated Bloodstream Infection:  Intervention: Infection risk assessment  Discussed mediport maintenance, infection prevention, and when to call the physician. Labs drawn. Goal: Will show no infection signs and symptoms  Will show no infection signs and symptoms   Outcome: Met This Shift  Mediport site with no redness or warmth. Skin over port intact with no signs of breakdown noted. Patient verbalizes signs/symptoms of port infection and when to notify the physician. Problem: Musculor/Skeletal Functional Status  Intervention: Fall precautions  Discussed fall precautions, fall risks, and instructed patient to ask for assistance with ambulation. Call light within reach. Goal: Absence of falls  Outcome: Met This Shift  Free from falls during infusion.     Problem: Intellectual/Education/Knowledge Deficit  Intervention: Verbal/written education provided  Patient educated blood product transfusion protocol:    Patient receiving 1-unit prbcs:      - Blood product transfusion information sheet given: questions answered and consent signed  - Take vital signs/ monitor lungs sound prior to transfusion  - Monitor patient for 15 minutes after transfusion started  - Take vital signs / monitor lungs sound in 15 minutes and post transfusion  - Assess IV site   - Monitor patient closely for potential transfusion reaction    Call MD if develop complications once discharged. Goal: Teaching initiated upon admission  Outcome: Met This Shift  Patient verbalizes understanding to verbal information given on blood transfusion, lupron, and xgeva,action and possible side effects. Aware to call MD if develop complications. Comments: Care plan reviewed with patient and caregiver. Patient and caregiver verbalize understanding of the plan of care and contribute to goal setting.

## 2018-09-12 NOTE — PROGRESS NOTES
Laterality Date    COLONOSCOPY      [place] COLSC FLX W/RMVL OF TUMOR POLYP LESION SNARE TQ N/A 11/14/2017    COLONOSCOPY POLYPECTOMY SNARE/COLD BIOPSY performed by Fanny Cherry MD at [facility]    [place] ESOPHAGOGASTRODUODENOSCOPY TRANSORAL DIAGNOSTIC N/A 11/14/2017    EGD ESOPHAGOGASTRODUODENOSCOPY performed by Fanny Cherry MD at [facility]    [place] INSJ TUNNELED CTR VAD W/SUBQ PORT AGE 5 YR/> N/A 7/11/2018    SINGLE LUMEN MEDIPORT INSERTION performed by Cedric Mcmanus MD at [address] [facility]  2011    Dr. Magui Montgomery      Family History   Problem Relation Age of Onset    High Blood Pressure Mother     Diabetes Mother     Cancer Father         throat      Social History   Substance Use Topics    Smoking status: Former Smoker     Types: Cigarettes, Pipe, Cigars     Quit date: 10/4/1981    Smokeless tobacco: Never Used      Comment: social smoker    Alcohol use No      No current facility-administered medications for this visit. No current outpatient prescriptions on file.      Facility-Administered Medications Ordered in Other Visits   Medication Dose Route Frequency Provider Last Rate Last Dose    cefTRIAXone (ROCEPHIN) 1 g IVPB in 50 mL D5W minibag  1 g Intravenous Q24H Merritt Newton MD   Stopped at 09/14/18 1545    acetaminophen (TYLENOL) tablet 650 mg  650 mg Oral Q4H PRN Merritt Newton MD        atenolol (TENORMIN) tablet 25 mg  25 mg Oral Daily Merritt Newton MD   25 mg at 09/14/18 0934    vitamin D (CHOLECALCIFEROL) tablet 1,000 Units  1 tablet Oral Daily Merritt Newton MD   1,000 Units at 09/14/18 0934    levothyroxine (SYNTHROID) tablet 125 mcg  125 mcg Oral Daily Merritt Newton MD   125 mcg at 09/14/18 0606    methadone (DOLOPHINE) tablet 5 mg  5 mg Oral Q12H PRN Merritt Newton MD        pravastatin (PRAVACHOL) tablet 40 mg  40 mg Oral Nightly Merritt Newton MD   40 mg at 09/14/18 2303    vitamin confusion and sleep disturbance. The patient is not nervous/anxious. Objective:   Physical Exam   Constitutional: He is oriented to person, place, and time. He appears well-developed and well-nourished. No distress. HENT:   Head: Normocephalic. Mouth/Throat: Oropharynx is clear and moist. No oropharyngeal exudate. Eyes: Pupils are equal, round, and reactive to light. EOM are normal. Right eye exhibits no discharge. Left eye exhibits no discharge. No scleral icterus. Neck: Normal range of motion. Neck supple. No JVD present. No tracheal deviation present. No thyromegaly present. Cardiovascular: Normal rate and normal heart sounds. Exam reveals no gallop and no friction rub. No murmur heard. Pulmonary/Chest: Effort normal and breath sounds normal. No stridor. No respiratory distress. He has no wheezes. He has no rales. He exhibits no tenderness. Abdominal: Soft. Bowel sounds are normal. He exhibits no distension and no mass. There is no tenderness. There is no rebound. Musculoskeletal: Normal range of motion. He exhibits no edema. Good range of motion in all four extremities. Lymphadenopathy:     He has no cervical adenopathy. Neurological: He is alert and oriented to person, place, and time. He has normal reflexes. No cranial nerve deficit. He exhibits normal muscle tone. Skin: Skin is warm. No rash noted. No erythema. Psychiatric: He has a normal mood and affect. His behavior is normal. Judgment and thought content normal.   Vitals reviewed.      /64 (Site: Right Upper Arm, Position: Sitting, Cuff Size: Medium Adult)   Pulse 69   Temp 98 °F (36.7 °C) (Oral)   Resp 16   Ht 6' 2.02\" (1.88 m)   Wt 164 lb 9.6 oz (74.7 kg)   SpO2 100%   BMI 21.12 kg/m²      Imaging studies and labs:   CBC:   Lab Results   Component Value Date    WBC 3.1 (L) 09/14/2018    HGB 7.8 (L) 09/14/2018    HCT 23.6 (L) 09/14/2018    MCV 92.5 09/14/2018     09/14/2018       Chemistry Component Value Date/Time     09/14/2018 0430    K 3.7 09/14/2018 0430    K 3.4 (L) 08/19/2018 0511     09/14/2018 0430    CO2 24 09/14/2018 0430    BUN 8 09/14/2018 0430    CREATININE 0.5 09/14/2018 0430        Component Value Date/Time    CALCIUM 7.4 (L) 09/14/2018 0430    ALKPHOS 150 (H) 09/14/2018 0430    AST 65 (H) 09/14/2018 0430    ALT 19 09/14/2018 0430    BILITOT 0.3 09/14/2018 0430          PT/INR:   No results for input(s): PROTIME, INR in the last 72 hours. APTT:   No results for input(s): APTT in the last 72 hours. PSA:  February 2018: 128.2  March 2018: 132  April 2018: 361  May 2018: 574  June 2018: 949  August 22, 2018: 987.5  September 12, 2018: 718.2    CT of the chest, abdomen and pelvis on 09/06/2018 showed:    1. Questionable cystitis. Findings of constipation. 2. Mild retroperitoneal adenopathy, unchanged from prior study, suspicious for metastatic disease. 3. dilatation left renal pelvis. Cannot exclude element of UPJ stenosis. 4. Multiple tiny low-density foci in the liver. Period findings highly suggestive of metastatic disease. 5. Extensive skeletal metastatic disease, not appreciably changed from prior study. Bone scan on 09/06/2018 showed:    1. Stable foci of abnormal radiotracer accumulation in the visualized skeleton consistent with diffuse osseous metastatic disease. 2. Probable degenerative arthropathic changes as detailed above. Assessment/Plan:        Diagnosis Orders   1. Prostate cancer [facility])  MRI Abdomen W WO Contrast    DISCONTINUED: denosumab (XGEVA) SC injection 120 mg   2. Metastatic cancer ([address]  MRI Abdomen W WO Contrast    DISCONTINUED: denosumab (XGEVA) SC injection 120 mg   3. Cancer, metastatic to bone [facility])  MRI Abdomen W WO Contrast   4. [facility] Therapy  MRI Abdomen W WO Contrast   5. CA of prostate (HCC)  methadone (DOLOPHINE) 5 MG tablet   6. Bone metastases (HCC)  methadone (DOLOPHINE) 5 MG tablet   7.  Cancer related pain Expiration Date:   9/12/2019     Order Specific Question:   Reason for exam:     Answer:   CT of the abdomen showed indeterminate liver lesion        Medications Prescribed:   Orders Placed This Encounter   Medications    Compression Stockings MISC     Sig: by Does not apply route above the knee     Dispense:  1 each     Refill:  1    methadone (DOLOPHINE) 5 MG tablet     Sig: Take 1 tablet by mouth every 6 hours as needed for Pain for up to 30 days. .     Dispense:  90 tablet     Refill:  0

## 2018-09-12 NOTE — PROGRESS NOTES
Patient assessed for the following post blood transfusion, lupron and xgeva injections:    Dizziness   No  Lightheadedness  No     Acute nausea/vomiting           No  Headache   No  Chest pain/pressure  No  Rash/itching   No  Shortness of breath  No    Patient kept for 20 minutes observation post blood transfusion. .    Patient tolerated 1-unit blood transfusion without any complications. Patient tolerated lupron IM injection and xgeva subq injection. Labs drawn per mediport. Last vital signs:   /64   Pulse 71   Temp 98 °F (36.7 °C) (Oral)   Resp 18 Comment: lungs clear  Ht 6' 2\" (1.88 m)   Wt 164 lb 9.6 oz (74.7 kg)   SpO2 100%   BMI 21.13 kg/m²     Patient instructed if experience any of the above symptoms following today's infusion and injections,,he/she is to notify MD immediately or go to the emergency department. Discharge instructions given to patient. Verbalizes understanding. Ambulated off unit per self with caregiver with belongings.

## 2018-09-13 PROBLEM — R40.4 ALTERED SENSORIUM: Status: ACTIVE | Noted: 2018-01-01

## 2018-09-14 PROBLEM — E43 SEVERE MALNUTRITION (HCC): Chronic | Status: ACTIVE | Noted: 2018-01-01

## 2018-09-15 PROBLEM — C79.51 PROSTATE CANCER METASTATIC TO BONE (HCC): Status: ACTIVE | Noted: 2018-01-01

## 2018-09-16 NOTE — PROGRESS NOTES
smoker    Alcohol use No       No current facility-administered medications for this visit. No current outpatient prescriptions on file. Facility-Administered Medications Ordered in Other Visits   Medication Dose Route Frequency Provider Last Rate Last Dose    magnesium hydroxide (MILK OF MAGNESIA) 400 MG/5ML suspension 30 mL  30 mL Oral Daily PRN Jenifer Mercado MD        docusate sodium (COLACE) capsule 100 mg  100 mg Oral Daily Jenifer Mercado MD   100 mg at 09/16/18 1402    cefTRIAXone (ROCEPHIN) 1 g IVPB in 50 mL D5W minibag  1 g Intravenous Q24H Randolph Mojica MD   Stopped at 09/16/18 1427    acetaminophen (TYLENOL) tablet 650 mg  650 mg Oral Q4H PRN Randolph Mojica MD        atenolol (TENORMIN) tablet 25 mg  25 mg Oral Daily Randolph Mojica MD   25 mg at 09/16/18 0816    vitamin D (CHOLECALCIFEROL) tablet 1,000 Units  1 tablet Oral Daily Randolph Mojica MD   1,000 Units at 09/16/18 0816    levothyroxine (SYNTHROID) tablet 125 mcg  125 mcg Oral Daily Randolph Mojica MD   125 mcg at 09/16/18 0441    methadone (DOLOPHINE) tablet 5 mg  5 mg Oral Q12H PRN Randolph Mojica MD        pravastatin (PRAVACHOL) tablet 40 mg  40 mg Oral Nightly Randolph Mojica MD   40 mg at 09/15/18 2039    vitamin B-6 (PYRIDOXINE) tablet 100 mg  100 mg Oral Daily Randolph Mojica MD   100 mg at 09/16/18 0816     No Known Allergies    Subjective:      Review of Systems   Constitutional: Negative. HENT: Negative. Cardiovascular: Positive for leg swelling. Gastrointestinal: Positive for nausea and vomiting. Endocrine: Positive for cold intolerance. Genitourinary: Positive for difficulty urinating. Musculoskeletal: Negative. Allergic/Immunologic: Negative. Neurological: Negative. Psychiatric/Behavioral: Positive for behavioral problems and decreased concentration.        Objective:   BP 98/62 (Site: Left Arm, Position: Sitting, Cuff Size: Medium Adult)

## 2018-09-20 NOTE — TELEPHONE ENCOUNTER
Spoke to [facility] (on hippa) to let her that I will call her Monday with a new date for Janet's surgery. He needs rescheduled for a robot RIH repair. I also LM for Wanda at the [facility] with the same info.

## 2018-09-21 NOTE — TELEPHONE ENCOUNTER
Navigation:     Karthikeyan outreach to pt caregiver/neighbor, Astrid re: status of hospital bed delivery. Bed has not been delivered yet, it is still in process, as per [facility] communication. Elder Frost shares, she, & her spouse, & her daughter are cleaning pt's home today. She also shares pt's family is aware of pt's current status. Pt appt with Dr. Daily Hankins on Monday.     Karthikeyan cont to follow to assist.

## 2018-09-24 NOTE — PATIENT INSTRUCTIONS
1. Proceed with first cycle of Cabazitaxel today.    2 RTC on October 2, 2018 to be seen by Chino Medina and marcella

## 2018-09-24 NOTE — PROGRESS NOTES
Patient assessed for the following post chemotherapy:    Dizziness   No  Lightheadedness             No      Acute nausea/vomiting No  Headache   No  Chest pain/pressure  No  Rash/itching   No  Shortness of breath  No    Patient kept for 20 minutes observation post infusion chemotherapy. Patient tolerated chemotherapy treatment jevtana without any complications. Labs drawn per mediport. Last vital signs:   /65   Pulse 70   Temp 97.7 °F (36.5 °C) (Oral)   Resp 18   Ht 6' 2\" (1.88 m)   Wt 146 lb (66.2 kg)   SpO2 100%   BMI 18.75 kg/m²     Patient instructed if experience any of the above symptoms following today's infusion,he/she is to notify MD immediately or go to the emergency department. Discharge instructions given to patient. Verbalizes understanding. Ambulated off unit per self with caregiver with belongings.

## 2018-09-24 NOTE — PLAN OF CARE
Problem: Infection - Central Venous Catheter-Associated Bloodstream Infection:  Intervention: Infection risk assessment  Instructed to monitor for signs/symptoms of infection at [address] [facility] [address] and call MD if problems develop. Goal: Will show no infection signs and symptoms  Will show no infection signs and symptoms   Outcome: Met This Shift  Mediport site with no redness or warmth. Skin over port site intact with no signs of breakdown noted. Patient verbalizes signs/symptoms of port infection and when to notify the physician. Problem: Intellectual/Education/Knowledge Deficit  Intervention: Verbal/written education provided  Chemotherapy Teaching     What is Chemotherapy   Drug action [x]   Method of Administration [x]   Handouts given []     Side Effects  Nausea/vomiting [x]   Diarrhea [x]   Fatigue [x]   Signs / Symptoms of infection [x]   Neutropenia [x]   Thrombocytopenia [x]   Alopecia [x]   neuropathy [x]   [place] diet &  the importance of fluids [x]       Micellaneous  Importance of nutrition [x]   Importance of oral hygiene [x]   When to call the MD [x]   Monitoring labs [x]   Use of supportive services []     Explanation of Drug Regimen / Frequency  Jevtana- C1D1     Comments  Verbalized understanding to drug,action,side effects and when to call MD       Goal: Teaching initiated upon admission  Outcome: Met This Shift      Patient verbalizes understanding to verbal information given on Jevtana,action and possible side effects. Aware to call MD if develop complications. Problem: Musculor/Skeletal Functional Status  Intervention: Fall precautions  Verbalized understanding of fall prevention to ask for assistance with ambulation. Call light within reach. Transported off unit in wheelchair. Goal: Absence of falls  Outcome: Met This Shift  No falls occurred with visit today.     Problem: Discharge Planning  Intervention: Interaction with patient/family and care team  Discuss understanding of discharge instructions,follow-up appointments, and when to call the physician. Goal: Knowledge of discharge instructions  Knowledge of discharge instructions     Outcome: Met This Shift  Verbalized understanding of discharge instructions, follow-up appointments, and when to call the physician. Comments: Care plan reviewed with patient and caregiver. Patient and caregiver verbalize understanding of the plan of care and contribute to goal setting.

## 2018-09-24 NOTE — PROGRESS NOTES
Name: David Mackey  : 1950  MRN: [id number]    Oncology Navigation Follow-Up Note    Contact Type:  Medical Oncology- metastatic prostate cancer  Recent discharge (18)  from [address], after admission with AMS. Found to have UTI, & was treated with AB therapy. Subjective: Here for appt with Dr. Shlomo Dean. Appetite good, eating 2-3 meals per day, along with 2-3 nutritional supplements per day. Changing depends 4-5x per day  Bathing daily with the help of Astrid, neighbor/caregiver. Taking methadone 5 mg TID.  +Constipation->taking colace 1 tab per day, & has used miralax x3 days, without any bowel movement. +FLATUS. Drinking 48 oz H2O per day. Hospital Bed delivery planned for tomorrow, 18. Concerns re: Great Toe left foot, toenail pulled back. In need of nail care. Concern re: some unsteadiness with mobility within his home. Request for rolling walker for safety. Also needing RX refill of steroid. Caregiver received phone call from Shaye Walden at Dr. Kan Kang office verbalizing she was going to be rescheduling pt's hernia repair surgery today & would update her accordingly. IS surgery supposed to be done? Objective: WBC 3.8  HGB 8.2  HCT 23.7    Ionized CA 1.02  BUN 18 CREA 0.4  Last PSA drawn on 18:  718.20  Pt desires continued chemotherapy    Pt's toenail thickened & pulled back from trauma of his dog jumping on him. Initiating treatment with Rocky Manuel today. Assistance Needed: Contact Surg office to ensure Hernia repair surgery is on hold so chemotherapy can be resumed. Receptive to Advanced Care Planning / Palliative Care:  following    Referrals: Podiatry OR pt's PCP at [facility] for return phone call. Will fax the order to [facility]. Brianna Bee RX for [employer] for Steroid Refill    Education: INCREASE Colace to TID, & add Metamucil 1x per day.       Notes: CYCLE #1 Norah Hou today

## 2018-10-01 NOTE — TELEPHONE ENCOUNTER
Spoke with Lyndsay Benjamin at [address] [place] [address] Road that per dr Gonzalez Daniels the pt probably won`t take the Methadone around the clock and that it is to early to write a script for this.

## 2018-10-02 NOTE — PROGRESS NOTES
[facility] [address] [facility] [address], [address] [facility] [address]  [address] [address]  Dept: [phone]  Dept Fax: [phone]: [phone]      Visit Date: 10/2/2018     Shirley Haji Sr. is a 78 y.o. male who presents today for:   Chief Complaint   Patient presents with    Follow-up     Prostate Cancer        HPI:   Erasto Michel is a 78year old male that is followed by Dr. Dandre Leon for metastatic prostate cancer. Per Dr. J Luis Tolliver progress note: This is a 79 yo AAM diagnosed with prostate cancer in 2011. He underwent RALRP in June 2011 that showed 2 cm moderately differentiated adenocarcinoma, [place] score 7 (3+4), all margins were negative, there was no involvement of seminal vesicles, there was evidence of perineural involvement. His surgery was complicated by incontinence and erectile dysfunction. His 5 months PSA was up from 0.07 after surgery to 0.23. Per Dr. Chula Marsh note the patient did receive delayed RTC sometime in 2014, however there is no Rad Onc note in Epic to confirm that. His PSA was slowly but steadily rising from 2011. In 11/2015 it was 4.98, then in February 2016 it decrease to 0.84. Sometime in 2014/2015 he was started on Firmagon injections due to rising PSA, then the patient was lost to follow up between February 2016 and November 2017 when his PSA was 646.7. On November 10, 2017 the patient presented to ED with constipation and weight loss of a 2 month. Imaging studies showed :  diffuse osseous abnormalities. There is extensive heterogeneous appearance of the osseous framework throughout all structures. This is a significant interval change since the prior exam, and is consistent with diffuse osseous metastatic lesions    until proven otherwise. No evidence of fracture.       Soft tissues are nonspecific.  No convincing pathologically enlarged femoral lymph nodes though the femoral lymph nodes are slightly prominent, short axis diameters of approximately 8-9 mm.      Bone scan

## 2018-10-02 NOTE — TELEPHONE ENCOUNTER
Patient needs new hard copy script printed and faxed to pharmacy. Pharmacy states the previous order (every 6 hours as needed) was inaccurate because methadone is to be scheduled around the clock.

## 2018-10-02 NOTE — TELEPHONE ENCOUNTER
Refill signed and paper script handed to patient. OARRS report reviewed, no signs of narcotic abuse identified.

## 2018-10-03 NOTE — PLAN OF CARE
Problem: Intellectual/Education/Knowledge Deficit  Intervention: Verbal/written education provided  Discuss port maintenance, infection prevention, signs and when to call Dr    Goal: Teaching initiated upon admission  Outcome: Met This Shift  Mediport site with no redness or warmth. Skin over port intact with no signs of breakdown noted. Patient verbalizes signs/symptoms of port infection and when to notify the physician. Problem: Discharge Planning  Intervention: Discharge to appropriate level of care  Discuss port maintenance, infection prevention, signs and when to call Dr    Goal: Knowledge of discharge instructions  Knowledge of discharge instructions    Outcome: Met This Shift  Verbalized understanding of discharge instructions, follow ups and when to call doctor    Problem: Infection - Central Venous Catheter-Associated Bloodstream Infection:  Intervention: Infection risk assessment  Discuss port maintenance, infection prevention, signs and when to call Dr    Goal: Will show no infection signs and symptoms  Will show no infection signs and symptoms  Outcome: Met This Shift  Mediport site with no redness or warmth. Skin over port intact with no signs of breakdown noted. Patient verbalizes signs/symptoms of port infection and when to notify the physician. Problem: Falls - Risk of: Intervention: Assess risk factors for falls  Assess for fall risk, instruct to ask for assistance with ambulation    Goal: Will remain free from falls  Will remain free from falls  Outcome: Met This Shift  Free from falls while in infusion center. Verbalized understanding of fall prevention and to ask for assistance with ambulation    Comments: Care plan reviewed with patient. Patient  verbalized understanding of the plan of care and contribute to goal setting.

## 2018-10-03 NOTE — PROGRESS NOTES
Patient tolerated transfusion of blood without any complications. Patient kept for 20 minutes observation post transfusion. Denies dizziness, lightheadedness, acute nausea or vomiting, headache, chest pain/pressure, rash/itching, or an increase in SOB. Last vital signs:   /69   Pulse 76   Temp 98 °F (36.7 °C) (Oral)   Resp 18   Wt 149 lb 6 oz (67.8 kg)   SpO2 100%   BMI 19.17 kg/m²     Patient instructed if they experience any of the above symptoms following today's transfusion,he/she is to notify the physician immediately or go to the emergency department. Discharge instructions given to patient. Verbalizes understanding. Ambulated off unit per self.

## 2018-10-07 PROBLEM — M62.421 CONTRACTURE OF MUSCLE OF RIGHT UPPER ARM: Status: ACTIVE | Noted: 2018-01-01

## 2018-10-07 NOTE — ED NOTES
Pt resting on cot. Updated on POC. Call light within reach. No concerns voiced.      Mala Camargo RN  10/07/18 [id number]

## 2018-10-07 NOTE — H&P
COLSC FLX W/RMVL OF TUMOR POLYP LESION SNARE TQ N/A 11/14/2017    COLONOSCOPY POLYPECTOMY SNARE/COLD BIOPSY performed by Anuj Huff MD at [address] [facility]    CT ESOPHAGOGASTRODUODENOSCOPY TRANSORAL DIAGNOSTIC N/A 11/14/2017    EGD ESOPHAGOGASTRODUODENOSCOPY performed by Anuj Huff MD at [facility] [address] W/SUBQ PORT AGE 5 YR/> N/A 7/11/2018    SINGLE LUMEN MEDIPORT INSERTION performed by Zac Ortiz MD at [facility]  2011   [facility]  2011    Dr. Jyoti Valerio         Medications prior to admission      Prior to Admission medications    Medication Sig Start Date End Date Taking? Authorizing Provider   methadone (DOLOPHINE) 5 MG tablet Take 1 tablet by mouth every 6 hours for 30 days. [facility]: 10/2/18 10/2/18 11/1/18  Sofia Hernandez PA-C   ondansetron (ZOFRAN) 4 MG tablet Take 1 tablet by mouth every 8 hours as needed for Nausea or Vomiting 10/2/18   Sofia Hernandez PA-C   predniSONE (DELTASONE) 10 MG tablet Take 1 tablet by mouth daily for 21 doses 9/24/18 10/15/18  Miguel Angel Foster MD   [facility] [place] (WALKER WHEELS) MISC 1 each by Does not apply route once for 1 dose 9/24/18 9/24/18  Miguel Angel Foster MD   docusate sodium (COLACE, DULCOLAX) 100 MG CAPS Take 100 mg by mouth daily 9/20/18   Brigida Gilman MD   levothyroxine (SYNTHROID) 125 MCG tablet Take 1 tablet by mouth Daily 9/20/18   Brigida Gilman MD   Compression Stockings MISC by Does not apply route above the knee 9/12/18   Miguel Angel Foster MD   methadone (DOLOPHINE) 5 MG tablet Take 1 tablet by mouth every 6 hours as needed for Pain for up to 30 days. .  Patient taking differently: Take 5 mg by mouth every 6 hours.  . 9/12/18 10/12/18  Miguel Angel Foster MD   Magic Mouthwash (MIRACLE MOUTHWASH) Swish and spit 5 mLs 4 times daily as needed for Irritation Prepare mixture 1:1:1 DIPHENHYDRAMINE HCL,NYSTATIN,LIDOCAINE HCL 8/8/18   Miguel Angel Foster MD   lidocaine-prilocaine (EMLA) 2.5-2.5 % cream Pertinent positives as noted in the HPI. All other systems reviewed and negative. ROS      Physical examination     /69   Pulse 71   Temp 98.6 °F (37 °C) (Oral)   Resp 14   Wt 135 lb (61.2 kg)   SpO2 100%   BMI 17.33 kg/m²     General appearance:  No apparent distress. Patient appears comfortable. He is covered with several blankets. HEENT:  Normocephalic. PERRL,  Extraocular motion intact. Conjunctivae clear. No nasal discharge. Oral mucosa moist without erythema or exudate. Neck: Supple. No JVD. No carotid bruits. No thyromegaly. Cardiovascular:  Regular rate and rhythm without murmurs, rubs or gallops. Respiratory:  Normal respiratory effort. Clear to auscultation, bilaterally without Rales/Wheezes/Rhonchi. Chest: Chemotherapy port at right anterior chest  Abdomen: Soft, non-tender, non-distended, normal bowel sounds. Musculoskeletal: Full range of motion without deformity. Neurologic: Contractures of the digits of the right hand with flexion of the digits. The patient's fingers can be easily extended by myself. Cranial nerves: II-XII intact  Lymphatic: No cervical lymphadenopathy. Psychiatric:  Alert and oriented. Conversational.  Vascular: Dorsalis pedis pulses palpable bilaterally 2+. Radial pulses palpable bilaterally 2+. Capillary refill<3 seconds. Bilateral pitting peripheral edema 2+ up to the level of the thigh. Genitourinary: Deferred  Skin: Stasis dermatitis at the bilateral lower extremities      Labs and imaging     Recent Labs      10/07/18   1257   WBC  2.0*   HGB  8.4*   HCT  25.3*   PLT  246     Recent Labs      10/07/18   1257   NA  137   K  3.1*   CL  100   CO2  24   BUN  13   CREATININE  0.5   CALCIUM  8.2*     Recent Labs      10/07/18   1257   AST  46*   ALT  15   BILITOT  0.4   ALKPHOS  280*     Recent Labs      10/07/18   1257   INR  1.09     No results for input(s): [place] Force in the last 72 hours.     Urinalysis:      Lab Results   Component TECHNIQUE: Delayed anterior posterior whole-body planar images were obtained following radiopharmaceutical administration. Additional images of the pelvis were obtained in multiple projections. COMPARISON: Nuclear medicine whole body bone scan 6/29/2018. FINDINGS: Foci of abnormal radiotracer accumulation in the visualized skeleton are stable in number compared the prior study. There are no new lesions to suggest interval disease progression. Abnormal activity is again noted in the thoracolumbar spine, multiple bilateral ribs, the right calvarium, the bilateral proximal humeri, the upper and lower pelvis and the bilateral proximal femurs. Inter orbital, maxillary and mandibular activity is likely infectious or inflammatory. Probable degenerative arthropathic changes are again noted in the thoracolumbar spine, bilateral shoulders, sternoclavicular joints, elbows, wrist, hands, hips, knees, ankles and feet. Physiologic activity is seen in the soft tissues, kidneys and urinary bladder. 1. Stable foci of abnormal radiotracer accumulation in the visualized skeleton consistent with diffuse osseous metastatic disease. 2. Probable degenerative arthropathic changes as detailed above. Final report electronically signed by Dr. Kellen Desouza on 9/7/2018 4:21 PM    Vl Dup Lower Extremity Venous Left    Result Date: 9/13/2018  PROCEDURE: VL DUP LOWER EXTREMITY VENOUS LEFT CLINICAL INFORMATION: 29-year-old male with left lower extremity swelling. COMPARISON: Comparison is made to ultrasound dated 7/25/2018. TECHNIQUE: Venous doppler ultrasound was performed of the bilateral lower extremities using gray scale, color flow and spectral doppler imaging. FINDINGS:  There is an absence of flow with noncompressibility of the greater saphenous vein below the knee. There is normal color flow, spectral analysis and compressibility of the common femoral vein, superficial femoral vein and popliteal vein on the left .  There is normal

## 2018-10-07 NOTE — ED PROVIDER NOTES
!! methadone (DOLOPHINE) 5 MG tablet Take 1 tablet by mouth every 6 hours as needed for Pain for up to 30 days. Mirta Gold: 90 tablet, Refills: 0    Associated Diagnoses: CA of prostate ([address] Bone metastases ([address] Cancer related pain      Magic Mouthwash (MIRACLE MOUTHWASH) Swish and spit 5 mLs 4 times daily as needed for Irritation Prepare mixture 1:1:1 DIPHENHYDRAMINE HCL,NYSTATIN,LIDOCAINE HCL  Qty: 300 mL, Refills: 1      lidocaine-prilocaine (EMLA) 2.5-2.5 % cream Apply topically as needed. Qty: 30 g, Refills: 1      Misc. Devices (COMMODE BEDSIDE) MISC 1 Units by Does not apply route daily  Qty: 1 each, Refills: 0      dexamethasone (DECADRON) 4 MG tablet Take 2 tabs (8 mg) by mouth 2 times daily for three days beginning 1 day prior to scheduled DOCEtaxel administration. Qty: 12 tablet, Refills: 2    Associated Diagnoses: Prostate cancer ([facility] [address]      vitamin B-6 (PYRIDOXINE) 100 MG tablet Take 100 mg by mouth daily      atenolol (TENORMIN) 25 MG tablet Take 25 mg by mouth daily      Cholecalciferol (VITAMIN D-3) 1000 units CAPS Take 1 capsule by mouth daily      acetaminophen (TYLENOL) 325 MG tablet Take 2 tablets by mouth every 4 hours as needed for Pain  Qty: 120 tablet, Refills: 3      Multiple Vitamins-Minerals (MENS MULTI VITAMIN & MINERAL PO) Take by mouth      pravastatin (PRAVACHOL) 40 MG tablet Take 40 mg by mouth daily. !! - Potential duplicate medications found. Please discuss with provider. ALLERGIES     has No Known Allergies. FAMILY HISTORY     indicated that his mother is . He indicated that his father is . He indicated that his sister is alive. He indicated that his brother is alive. family history includes Cancer in his father; Diabetes in his mother; High Blood Pressure in his mother. SOCIAL HISTORY      reports that he quit smoking about 37 years ago. His smoking use included Cigarettes, Pipe, and Cigars.  He has never used smokeless tobacco. He It may contain minor errors which are inherent in voice recognition technology. **      Final report electronically signed by Dr. Christian Leach on 10/7/2018 12:25 PM      MRI BRAIN WO CONTRAST    (Results Pending)   MRA HEAD WO CONTRAST    (Results Pending)   MRA NECK W WO CONTRAST    (Results Pending)       LABS:   Labs Reviewed   CBC WITH AUTO DIFFERENTIAL - Abnormal; Notable for the following:        Result Value    WBC 2.0 (*)     RBC 2.76 (*)     Hemoglobin 8.4 (*)     Hematocrit 25.3 (*)     RDW-CV 20.7 (*)     RDW-SD 66.9 (*)     MPV 8.6 (*)     Segs Absolute 0.9 (*)     Lymphocytes # 0.7 (*)     All other components within normal limits   COMPREHENSIVE METABOLIC PANEL - Abnormal; Notable for the following:     Glucose 125 (*)     Potassium 3.1 (*)     Calcium 8.2 (*)     AST 46 (*)     Alkaline Phosphatase 280 (*)     All other components within normal limits   PHOSPHORUS - Abnormal; Notable for the following:     Phosphorus 1.0 (*)     All other components within normal limits   TSH WITH REFLEX - Abnormal; Notable for the following:     TSH 8.960 (*)     All other components within normal limits   PROTIME-INR   APTT   MAGNESIUM   ANION GAP   OSMOLALITY   GLOMERULAR FILTRATION RATE, ESTIMATED   SCAN OF BLOOD SMEAR   VITAMIN B12 & FOLATE   T4, FREE   URINE RT REFLEX TO CULTURE   CBC   BASIC METABOLIC PANEL W/ REFLEX TO MG FOR LOW K   POCT GLUCOSE       EMERGENCY DEPARTMENT COURSE:   Vitals:    Vitals:    10/07/18 1227 10/07/18 1332 10/07/18 1500 10/07/18 1545   BP: 119/77 107/67 102/69 103/61   Pulse: 74 71 71 69   Resp: 16 17 14 16   Temp: 98.6 °F (37 °C)   98.3 °F (36.8 °C)   TempSrc: Oral   Oral   SpO2: 100% 100% 100% 100%   Weight: 135 lb (61.2 kg)   135 lb (61.2 kg)   Height:    6' 2\" (1.88 m)       12:53 PM: The patient was seen and evaluated. MDM:  The patient was seen and evaluated by me at bedside for right hand weakness and loss of coordination.  History and physical exam were obtained revealing the

## 2018-10-08 PROBLEM — C78.7 PROSTATE CANCER METASTATIC TO LIVER (HCC): Status: ACTIVE | Noted: 2018-01-01

## 2018-10-08 NOTE — PROGRESS NOTES
steady without LOB. Distance: 250 feet            Exercises:  Comments: Pt performs B LE AROM while seated EOB: ankle pumps, marches and LAQ x 10 reps to increase strength for improved gait. Activity Tolerance:  Activity Tolerance: Patient Tolerated treatment well    Treatment Initiated: See above exercises. Assessment: Body structures, Functions, Activity limitations: Decreased functional mobility , Decreased strength  Assessment: Pt tolerates session well, amb in hallway without AD with SBA. Pt overall steady, no LOB. Pt would benefit from continued PT for strengthening and gait training. Prognosis: Excellent    Clinical Presentation: Low - Stable and Uncomplicated: Pt tolerates session well, amb in hallway without AD with SBA. Pt overall steady, no LOB. Pt would benefit from continued PT for strengthening and gait training. Decision Making: High Complexitybased on patient assessment and decision making process of determining plan of care and establishing reasonable expectations for measurable functional outcomes    REQUIRES PT FOLLOW UP: Yes    Discharge Recommendations:  Discharge Recommendations: Home with assist PRN    Patient Education:  Patient Education: POC    Equipment Recommendations:  Equipment Needed: No    Safety:  Type of devices: All fall risk precautions in place, Call light within reach, Left in bed, Nurse notified (Left sitting EOB)  Restraints  Initially in place: No    Plan:  Times per week: 1-2 X GM  Times per day: Daily  Current Treatment Recommendations: Strengthening, Neuromuscular Re-education, Home Exercise Program, Safety Education & Training, Balance Training, Functional Mobility Training, Transfer Training, Gait Training, Stair training, Patient/Caregiver Education & Training    Goals:  Patient goals : To go home.   Short term goals  Time Frame for Short term goals: 1 week  Short term goal 1: Pt to transfer sit <--> stand S for increased functional

## 2018-10-08 NOTE — CONSULTS
NEUROLOGY CONSULT NOTE      Requesting Physician: Roxie Pemberton MD    Reason for Consult:  Evaluate for stroke    History of Present Illness:  Janet Caballero is a 78 y.o. male admitted to [address] [place] [address] on 10/7/2018.        79 yo man with metastatic prostate cancer, HL, HTN, woke up this AM c/o of right hand weakness, he couldn't write like he used to be, have hard time holding a fork. No arm weakness and no slurreed speech or legs weakness. This never happened before. Reports no chest pain. No shortness of breath with exertion. Reports no neck pain. No vision changes. No dysphagia. No fever. No rash. No weight loss. Past Medical History:        Diagnosis Date    Backache     Cancer [facility]) 2011    prostate    Goiter     Hyperlipidemia     Hypertension     Hypothyroidism     Incontinence of urine 2/28/2012    Osteoarthritis            Procedure Laterality Date    COLONOSCOPY      AK COLSC FLX W/RMVL OF TUMOR POLYP LESION SNARE TQ N/A 11/14/2017    COLONOSCOPY POLYPECTOMY SNARE/COLD BIOPSY performed by Gregory Najera MD at [address] [facility]    AK ESOPHAGOGASTRODUODENOSCOPY TRANSORAL DIAGNOSTIC N/A 11/14/2017    EGD ESOPHAGOGASTRODUODENOSCOPY performed by Gregory Najera MD at [facility] [address] W/SUBQ PORT AGE 5 YR/> N/A 7/11/2018    SINGLE LUMEN MEDIPORT INSERTION performed by Jean Pierre Kapoor MD at [address] [place] [address]  2011    Dr. Jigar Rayo       Allergies:    No Known Allergies     Current Medications:     No current facility-administered medications for this encounter.       Social History:  History   Smoking Status    Former Smoker    Types: Cigarettes, Pipe, Cigars    Quit date: 10/4/1981   Smokeless Tobacco    Never Used     Comment: social smoker     History   Alcohol Use No     History   Drug Use No         Family History:   Family History   Problem Relation Age of Onset    High Blood Pressure Mother    
recognition software. It may contain minor errors which are inherent in voice recognition technology. ** Final report electronically signed by Dr. Dino Porras on 9/13/2018 5:22 PM    Vl Dup Lower Extremity Venous Left    Result Date: 9/13/2018  PROCEDURE: VL DUP LOWER EXTREMITY VENOUS LEFT CLINICAL INFORMATION: 61-year-old male with left lower extremity swelling. COMPARISON: Comparison is made to ultrasound dated 7/25/2018. TECHNIQUE: Venous doppler ultrasound was performed of the bilateral lower extremities using gray scale, color flow and spectral doppler imaging. FINDINGS:  There is an absence of flow with noncompressibility of the greater saphenous vein below the knee. There is normal color flow, spectral analysis and compressibility of the common femoral vein, superficial femoral vein and popliteal vein on the left . There is normal color flow and compressibility in the posterior tibial veins, anterior tibial veins and peroneal veins. There is normal color flow, spectral analysis and There is extensive edema throughout the superficial subcutaneous tissues below the knee. Compressibility in the contralateral common femoral vein. 1. No evidence of deep vein thrombosis throughout the left lower extremity. 2. Superficial thrombophlebitis involving the greater saphenous vein below the knee. 3. Extensive edema throughout the subcutaneous soft tissues below the knee. **This report has been created using voice recognition software. It may contain minor errors which are inherent in voice recognition technology. ** Final report electronically signed by Dr Marky Peacock on 9/13/2018 2:01 PM    Mra Neck W Wo Contrast    Result Date: 10/8/2018  PROCEDURE: MRA NECK W WO CONTRAST, MRA HEAD WO CONTRAST CLINICAL INFORMATION: STROKE, sx of left cortical stroke. Right-handed weakness, prostate cancer COMPARISON: No prior study. TECHNIQUE: Coronal contrast-enhanced MRA images were obtained through the vessels of the and neck.

## 2018-10-08 NOTE — PROGRESS NOTES
CT CERVICAL SPINE WO CONTRAST   Final Result   1. No acute findings. 2. Extensive inhomogeneous mineralization of the entire cervical spine, likely relating diffuse metastatic disease from the patient's known prostate cancer. 3. Moderate multifocal degenerative changes with mild spinal stenosis C3-4 and C4-5 levels, as described above. **This report has been created using voice recognition software. It may contain minor errors which are inherent in voice recognition technology. **      Final report electronically signed by Dr. Sergio Cunha on 10/7/2018 1:31 PM      XR CHEST STANDARD (2 VW)   Final Result   Stable radiographic appearance of the chest. No evidence of an acute process. **This report has been created using voice recognition software. It may contain minor errors which are inherent in voice recognition technology. **      Final report electronically signed by Dr. Kyle Esparza on 10/7/2018 1:18 PM      CT HEAD WO CONTRAST (CODE STROKE)   Final Result   No acute process            **This report has been created using voice recognition software. It may contain minor errors which are inherent in voice recognition technology. **      Final report electronically signed by Dr. Kyle Esparza on 10/7/2018 12:25 PM      MRA HEAD WO CONTRAST    (Results Pending)   MRA NECK W WO CONTRAST    (Results Pending)       Diet: DIET CARDIAC;    DVT prophylaxis: [] Lovenox                                 [x] SCDs                                 [] SQ Heparin                                 [x] Encourage ambulation           [] Already on Anticoagulation     Disposition:    [x] Home       [] TCU       [] Rehab       [] Psych       [] SNF       [] [place]       [] Other-    Code Status: Full Code    Assessment/Plan:    1. Contracture of the right upper extremity, most probable related to metastatic spread of prostate cancer to the cervical spine as noted on the MRI.  Baclofen,

## 2018-10-08 NOTE — PROGRESS NOTES
Contact guard assistance (From the edge of bed)  Stand to sit: Stand by assistance (to the edge of bed)    Balance  Sitting Balance: Stand by assistance  Standing Balance: Minimal assistance (Urinating at the toilet)     Time: 4 minutes  Activity: urinating at the toilet and finishing other ADLs in the bathroom     Functional Mobility  Functional - Mobility Device: No device  Activity: To/from bathroom  Assist Level: Stand by assistance  Functional Mobility Comments: Pt walked with a steady gait and no LOB noted. Forward head noted. Activity Tolerance:  Activity Tolerance: Patient Tolerated treatment well  Activity Tolerance: Pt returned to supine after session. He had finished his self care in standing at the sink. He demonstrated some hand and wrist exercises after sitting at the edge of bed. He was at the edge of bed with his lunch tray and phlebotomy at the end of session. Treatment Initiated:  Pt was willing to work on his bathing and changing his hospital gown while standing at sink. He needed reminders about what tasks he was doing. Supervision for the bathing but MIN A needed for changing the gown. He had walked back to the bed with Supervision while using no AD. He sat at the edge of the bed and demonstrated some exercises with his hands. Demonstration was provided. He could use a red theraball in B hands. He also was shown wrist flexion/extension ROM exercises. He used a flashlight and needed verbal cues for technique. 5 reps each with B hands. Exercises demonstrated to increase his strength for increased ease of doing self care activity. Instructed pt to use a can of vegetables at home so he can follow up with the exercises. He wrote his name using a pen in his R hand with an adapted . He indicated that it was helping him with increased coordination. He also had a  placed onto his eating utensil.   He verbalized 0-100% Score: 38.32  ADL Inpatient CMS G-Code Modifier : CJ

## 2018-10-09 PROBLEM — E43 SEVERE MALNUTRITION (HCC): Chronic | Status: ACTIVE | Noted: 2018-01-01

## 2018-10-09 NOTE — PROGRESS NOTES
Donis score 7 (3+4), all margins were negative, there was no involvement of seminal vesicles, there was evidence of perineural involvement. His surgery was complicated by incontinence and erectile dysfunction. His 5 months PSA was up from 0.07 after surgery to 0.23. Per Dr. Phoenix Cullen note the patient did receive delayed RTC sometime in 2014, however there is no Rad Onc note in Epic to confirm that. His PSA was slowly but steadily rising from 2011. In 11/2015 it was 4.98, then in February 2016 it decrease to 0.84. Sometime in 2014/2015 he was started on Firmagon injections due to rising PSA, then the patient was lost to follow up between February 2016 and November 2017 when his PSA was 646.7. On November 10, 2017 the patient presented to ED with constipation and weight loss of a 2 month. Imaging studies showed :  diffuse osseous abnormalities. There is extensive heterogeneous appearance of the osseous framework throughout all structures. This is a significant interval change since the prior exam, and is consistent with diffuse osseous metastatic lesions    until proven otherwise. No evidence of fracture.       Soft tissues are nonspecific. No convincing pathologically enlarged femoral lymph nodes though the femoral lymph nodes are slightly prominent, short axis diameters of approximately 8-9 mm.      Bone scan showed :  Extensive increased activity throughout the axial and appendicular skeleton. There is irregular ill-defined increased activity in the skull, right frontal region.       Mottled increased activity on most of the ribs. Similar finding can be seen with the vertebral bodies particularly from the anterior posterior projection this is well seen in the lower lumbar bodies.  Increased activity in the proximal femora bilaterally    in addition to more focal areas of increased activity in the femoral diaphyses findings are consistent with widespread prostatic disease which can be seen on the patient's recent has been created using voice recognition software. It may contain minor errors which are inherent in voice recognition technology. ** Final report electronically signed by Dr. Edwina Kwok on 9/13/2018 5:22 PM    Vl Dup Lower Extremity Venous Left    Result Date: 9/13/2018  PROCEDURE: VL DUP LOWER EXTREMITY VENOUS LEFT CLINICAL INFORMATION: 15-year-old male with left lower extremity swelling. COMPARISON: Comparison is made to ultrasound dated 7/25/2018. TECHNIQUE: Venous doppler ultrasound was performed of the bilateral lower extremities using gray scale, color flow and spectral doppler imaging. FINDINGS:  There is an absence of flow with noncompressibility of the greater saphenous vein below the knee. There is normal color flow, spectral analysis and compressibility of the common femoral vein, superficial femoral vein and popliteal vein on the left . There is normal color flow and compressibility in the posterior tibial veins, anterior tibial veins and peroneal veins. There is normal color flow, spectral analysis and There is extensive edema throughout the superficial subcutaneous tissues below the knee. Compressibility in the contralateral common femoral vein. 1. No evidence of deep vein thrombosis throughout the left lower extremity. 2. Superficial thrombophlebitis involving the greater saphenous vein below the knee. 3. Extensive edema throughout the subcutaneous soft tissues below the knee. **This report has been created using voice recognition software. It may contain minor errors which are inherent in voice recognition technology. ** Final report electronically signed by Dr Carolina Julien on 9/13/2018 2:01 PM    Mra Neck W Wo Contrast    Result Date: 10/8/2018  PROCEDURE: MRA NECK W WO CONTRAST, MRA HEAD WO CONTRAST CLINICAL INFORMATION: STROKE, sx of left cortical stroke. Right-handed weakness, prostate cancer COMPARISON: No prior study.  TECHNIQUE: Coronal contrast-enhanced MRA images were obtained through There is mild atrophy. Brain: There is no diffusion restriction to suggest ischemia. Mild punctate T2 hyperintensities bilaterally are nonspecific. Chronic microangiopathic foci are suspected. No definite mass. Brainstem: Normal Cerebellum: Normal Other:  The vascular flow voids are intact. Moderate inflammation of the mastoid air cells bilaterally. Mildly ethmoid mucosal thickening. The sella is unremarkable. The calvarium is unremarkable. Cervical spine is heterogeneous suspicious for small bony metastases. At C3-C4 a disc protrusion appears to have subtle cord flattening. There are small extra-axial collections bilaterally. No ischemia. Cervical spine metastatic disease is suspected **This report has been created using voice recognition software. It may contain minor errors which are inherent in voice recognition technology. ** Final report electronically signed by Dr. Estelita Hyatt on 10/8/2018 9:57 AM      Assessment/Recommendations    1. Metastatic Prostate Cancer - followed by Dr. Crystal Kaiser, oncology history as above. The patient received new chemotherapy agent with Meño Juarez on 9/24/18 after found to have disease progression 9/2018 in bones and liver, due for next treatment is due on 10/15/18. Known cervical spine metastatic disease per CT of Cspine, unchanged from prior study on CT cspine 9/13/18.               -Palliative Care following, Urology Oncology Navigator note reviewed from today. Patient has outpatient follow up with Dr. Crystal Kaiser scheduled for 10/15/18 at 1130.   2. 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 MRI of the brain, MRA of the head and neck, MRI of Cspine reviewed. Continue Baclofen, prednisone, PT/OT. Neurology following, no surgical intervention recommended. 3. Hypokalemia - resolved. 4. Hypocalcemia - improved. 5. Leukopenia - WBC 2100, . 4. Chemotherapy induced. Afebrile, no specific infectious symptoms.  Add neutropenic

## 2018-10-09 NOTE — PLAN OF CARE
Problem: Nutrition  Goal: Optimal nutrition therapy  Outcome: Ongoing  Nutrition Problem: Severe malnutrition  Intervention: Food and/or Nutrient Delivery: Continue current diet, Start ONS  Nutritional Goals: Pt. will consume 75% or more of meals during LOS.
Ongoing  No falls this shift. Pt using call light appropriately to call for assistance with ambulation to the bathroom and to chair. Pt is also compliant with use of non-skid slippers. Pt reports understanding of fall prevention when discussed. Problem: Daily Care:  Goal: Daily care needs are met  Daily care needs are met   Outcome: Ongoing  Patient able to perform daily cares with moderate assistance. Patient uses call light when assistance is needed. Problem: Discharge Planning:  Goal: Patients continuum of care needs are met  Patients continuum of care needs are met   Outcome: Ongoing  Pt met with palliative care yesterday, will remain available for patient. Care manager and social working helping with discharge needs. Problem: Risk for Impaired Skin Integrity  Goal: Tissue integrity - skin and mucous membranes  Structural intactness and normal physiological function of skin and  mucous membranes. Outcome: Ongoing  No new skin impairments noted. Pt understands the importance of frequent repositioning in order to prevent any skin breakdown. Problem: Pain:  Goal: Pain level will decrease  Pain level will decrease   Outcome: Ongoing  Pt report pain at 0 on scale. Pt states oral medication helping to achieve pain goal of a 0 on scale. Problem: Discharge Planning:  Goal: Participates in care planning  Participates in care planning   Outcome: Ongoing  Pt met with palliative care yesterday, will remain available for patient. Care manager and social working helping with discharge needs. Comments: Care plan reviewed with patient. Patient verbalizes understanding of the plan of care and contributes to goal setting.

## 2018-10-09 NOTE — PROGRESS NOTES
2. 3*  2.1*   HGB  8.4*  8.2*  8.7*   PLT  246  221  231     BMP:  Recent Labs      10/07/18   1257  10/08/18   0635  10/09/18   0632   NA  137  137  138   K  3.1*  3.4*  3.8   CL  100  103  99   CO2  24  22*  26   BUN  13  11  11   CREATININE  0.5  0.5  0.4   GLUCOSE  125*  107  79           No results found for this or any previous visit. Results for orders placed during the hospital encounter of 10/07/18   MRA HEAD WO CONTRAST    Narrative PROCEDURE: MRA NECK W WO CONTRAST, MRA HEAD WO CONTRAST    CLINICAL INFORMATION: STROKE, sx of left cortical stroke. Right-handed weakness, prostate cancer    COMPARISON: No prior study. TECHNIQUE: Coronal contrast-enhanced MRA images were obtained through the vessels of the and neck. Multiplanar reconstructions were obtained. These include MIP images. 2-D time-of-flight images with associated reconstructions were also obtained. 10 mL   ProHance is the intravenous contrast utilized. FINDINGS:    Neck images show: Aortic arch and branches: No significant stenosis. Right common carotid artery/ICA: No significant plaque or stenosis. Left common carotid artery/ICA: No significant plaque or stenosis. Vertebral arteries: No significant plaque or stenosis. Intracranial images show:    ANTERIOR CIRCULATION:  Internal carotid arteries:     Normal  Anterior cerebral arteries:   Normal  Middle cerebral arteries:     Normal    POSTERIOR CIRCULATION:  Distal vertebral arteries:     Normal  Basilar artery:                   Normal  Posterior cerebral arteries:  Normal  Posterior comm. arteries:    Not definitely visualized. No segmental occlusion, aneurysmal dilation, vascular malformation, or other vascular abnormalities are seen. Impression  No significant stenosis is identified within the head or neck. **This report has been created using voice recognition software.  It may contain minor errors which are inherent in voice recognition

## 2018-10-10 NOTE — DISCHARGE SUMMARY
Activity: activity as tolerated  Continue exercises per OT. Diet: DIET GENERAL;  Dietary Nutrition Supplements: Standard High Calorie Oral Supplement      Follow-up visits:   Benji Plaza DO  [address] [address]  [phone]        Dr. Fannie Isbell 10/15       Discharge Medications:      Yasmin Lim Medication Instructions [id number]    Printed on:10/10/18 7605   Medication Information                      acetaminophen (TYLENOL) 325 MG tablet  Take 2 tablets by mouth every 4 hours as needed for Pain             atenolol (TENORMIN) 25 MG tablet  Take 25 mg by mouth daily             Cholecalciferol (VITAMIN D-3) 1000 units CAPS  Take 1 capsule by mouth daily             Compression Stockings MISC  by Does not apply route above the knee             dexamethasone (DECADRON) 4 MG tablet  Take 2 tabs (8 mg) by mouth 2 times daily for three days beginning 1 day prior to scheduled DOCEtaxel administration. docusate sodium (COLACE, DULCOLAX) 100 MG CAPS  Take 100 mg by mouth daily             levothyroxine (SYNTHROID) 125 MCG tablet  Take 1 tablet by mouth Daily             lidocaine-prilocaine (EMLA) 2.5-2.5 % cream  Apply topically as needed. Magic Mouthwash (MIRACLE MOUTHWASH)  Swish and spit 5 mLs 4 times daily as needed for Irritation Prepare mixture 1:1:1 DIPHENHYDRAMINE HCL,NYSTATIN,LIDOCAINE HCL             methadone (DOLOPHINE) 5 MG tablet  Take 1 tablet by mouth every 6 hours as needed for Pain for up to 30 days. .             methadone (DOLOPHINE) 5 MG tablet  Take 1 tablet by mouth every 6 hours for 30 days. Margot Nieto Date: 10/2/18             Misc. Devices (COMMODE BEDSIDE) MISC  1 Units by Does not apply route daily             Misc.  Devices (WALKER WHEELS) MISC  1 each by Does not apply route once for 1 dose             Multiple Vitamins-Minerals (MENS MULTI VITAMIN & MINERAL PO)  Take by mouth             ondansetron (ZOFRAN) 4 MG

## 2018-10-14 NOTE — ED NOTES
Bed: 017A  Expected date: 10/14/18  Expected time: 6:25 PM  Means of arrival: [facility]  Comments:     Marina Chu RN  10/14/18 5414

## 2018-10-14 NOTE — PROGRESS NOTES
for congestion, dental problem, facial swelling, hearing loss, mouth sores, nosebleeds, sore throat, tinnitus and trouble swallowing. Eyes: Negative for discharge and visual disturbance. Respiratory: Negative for cough, chest tightness, shortness of breath and wheezing. Cardiovascular: Negative for chest pain, palpitations and leg swelling. Gastrointestinal: Positive for constipation and nausea. Negative for abdominal distention, abdominal pain, blood in stool, diarrhea, rectal pain and vomiting. Endocrine: Negative for cold intolerance, polydipsia and polyuria. Genitourinary: Positive for difficulty urinating (incontinence). Negative for decreased urine volume, dysuria, flank pain, hematuria and urgency. Musculoskeletal: Positive for back pain. Negative for arthralgias, gait problem, joint swelling, myalgias and neck stiffness. Skin: Negative for color change, rash and wound. Neurological: Positive for weakness. Negative for dizziness, tremors, seizures, speech difficulty, light-headedness, numbness and headaches. Hematological: Negative for adenopathy. Does not bruise/bleed easily. Psychiatric/Behavioral: Negative for confusion and sleep disturbance. The patient is not nervous/anxious. Objective:   Physical Exam   Constitutional: He is oriented to person, place, and time. He appears well-developed and well-nourished. No distress. HENT:   Head: Normocephalic. Mouth/Throat: Oropharynx is clear and moist. No oropharyngeal exudate. Eyes: Pupils are equal, round, and reactive to light. EOM are normal. Right eye exhibits no discharge. Left eye exhibits no discharge. No scleral icterus. Neck: Normal range of motion. Neck supple. No JVD present. No tracheal deviation present. No thyromegaly present. Cardiovascular: Normal rate and normal heart sounds. Exam reveals no gallop and no friction rub. No murmur heard.   Pulmonary/Chest: Effort normal and breath sounds normal. No stridor. No respiratory distress. He has no wheezes. He has no rales. He exhibits no tenderness. Abdominal: Soft. Bowel sounds are normal. He exhibits no distension and no mass. There is no tenderness. There is no rebound. Musculoskeletal: Normal range of motion. He exhibits no edema. Good range of motion in all four extremities. Lymphadenopathy:     He has no cervical adenopathy. Neurological: He is alert and oriented to person, place, and time. He has normal reflexes. No cranial nerve deficit. He exhibits normal muscle tone. Skin: Skin is warm. No rash noted. No erythema. Psychiatric: He has a normal mood and affect. His behavior is normal. Judgment and thought content normal.   Vitals reviewed. /61 (Site: Right Upper Arm, Position: Sitting, Cuff Size: Medium Adult)   Pulse 69   Temp 97.5 °F (36.4 °C) (Oral)   Resp 18   Ht 6' 2.02\" (1.88 m)   Wt 146 lb (66.2 kg)   SpO2 100%   BMI 18.74 kg/m²      Imaging studies and labs:   CBC:   Lab Results   Component Value Date    WBC 2.8 (L) 10/15/2018    HGB 8.6 (L) 10/15/2018    HCT 24.8 (L) 10/15/2018    MCV 87 10/15/2018     10/15/2018       Chemistry        Component Value Date/Time     (L) 10/15/2018 1116     10/14/2018 1913    K 3.3 (L) 10/15/2018 1116    K 3.8 10/14/2018 1913    K 3.4 (L) 10/08/2018 0635    CL 98 10/14/2018 1913    CO2 25 10/14/2018 1913    BUN 11 10/14/2018 1913    CREATININE 0.5 10/15/2018 1116    CREATININE 0.5 10/14/2018 1913        Component Value Date/Time    CALCIUM 8.5 10/14/2018 1913    ALKPHOS 346 (H) 10/15/2018 1116    AST 69 (H) 10/15/2018 1116    ALT 13 10/15/2018 1116    BILITOT 0.5 10/15/2018 1116          PT/INR:   No results for input(s): PROTIME, INR in the last 72 hours. APTT:   No results for input(s): APTT in the last 72 hours. PSA:  February 2018: 128.2  March 2018: 132  April 2018: 361  May 2018: 574  June 2018: 949        Assessment/Plan:        Diagnosis Orders   1.  Jonathan Delgado

## 2018-10-14 NOTE — ED PROVIDER NOTES
dexamethasone (DECADRON) 4 MG tablet Take 2 tabs (8 mg) by mouth 2 times daily for three days beginning 1 day prior to scheduled DOCEtaxel administration. , Disp-12 tablet, R-2Normal      vitamin B-6 (PYRIDOXINE) 100 MG tablet Take 100 mg by mouth dailyHistorical Med      atenolol (TENORMIN) 25 MG tablet Take 25 mg by mouth dailyHistorical Med      Cholecalciferol (VITAMIN D-3) 1000 units CAPS Take 1 capsule by mouth dailyHistorical Med      acetaminophen (TYLENOL) 325 MG tablet Take 2 tablets by mouth every 4 hours as needed for Pain, Disp-120 tablet, R-3OTC      Multiple Vitamins-Minerals (MENS MULTI VITAMIN & MINERAL PO) Take by mouth      pravastatin (PRAVACHOL) 40 MG tablet Take 40 mg by mouth daily. ALLERGIES     has No Known Allergies. FAMILY HISTORY     indicated that his mother is . He indicated that his father is . He indicated that his sister is alive. He indicated that his brother is alive. family history includes Cancer in his father; Diabetes in his mother; High Blood Pressure in his mother. SOCIAL HISTORY      reports that he quit smoking about 37 years ago. His smoking use included Cigarettes, Pipe, and Cigars. He has never used smokeless tobacco. He reports that he does not drink alcohol or use drugs. PHYSICAL EXAM     INITIAL VITALS:  height is 6' 2\" (1.88 m) and weight is 135 lb (61.2 kg). His oral temperature is 98 °F (36.7 °C). His blood pressure is 129/82 and his pulse is 86. His respiration is 15 and oxygen saturation is 100%. Physical Exam   Constitutional: He is oriented to person, place, and time. He appears well-developed and well-nourished. No distress. HENT:   Head: Normocephalic and atraumatic. Right Ear: External ear normal.   Left Ear: External ear normal.   Mouth/Throat: Oropharynx is clear and moist.   Eyes: Conjunctivae and EOM are normal.   Neck: Normal range of motion. Neck supple.    Cardiovascular: Normal rate, regular rhythm, 1. There is no acute cardiopulmonary process. **This report has been created using voice recognition software. It may contain minor errors which are inherent in voice recognition technology. **      Final report electronically signed by Dr. Juan Gibbs on 10/14/2018 7:51 PM            LABS:   Labs Reviewed   BASIC METABOLIC PANEL - Abnormal; Notable for the following:        Result Value    Glucose 121 (*)     All other components within normal limits   CBC WITH AUTO DIFFERENTIAL - Abnormal; Notable for the following:     WBC 2.7 (*)     RBC 2.96 (*)     Hemoglobin 9.0 (*)     Hematocrit 28.7 (*)     MCV 97.0 (*)     MCHC 31.4 (*)     RDW-CV 21.3 (*)     RDW-SD 74.1 (*)     MPV 8.8 (*)     Segs Absolute 1.4 (*)     Lymphocytes # 0.7 (*)     All other components within normal limits   HEPATIC FUNCTION PANEL - Abnormal; Notable for the following:     Alkaline Phosphatase 334 (*)     AST 71 (*)     All other components within normal limits   OSMOLALITY - Abnormal; Notable for the following:     Osmolality Calc 272.6 (*)     All other components within normal limits   CULTURE BLOOD #1   CULTURE BLOOD #1   BRAIN NATRIURETIC PEPTIDE   LIPASE   MAGNESIUM   TROPONIN   ANION GAP   GLOMERULAR FILTRATION RATE, ESTIMATED   SCAN OF BLOOD SMEAR         EMERGENCYDEPARTMENT COURSE AND MEDICAL DECISION MAKING:   Vitals:    Vitals:    10/14/18 1955 10/14/18 1958 10/14/18 2043 10/14/18 2155   BP:   121/73 129/82   Pulse:  72 79 86   Resp: 16 16 12 15   Temp:       TempSrc:       SpO2: 100% 100% 100% 100%   Weight:       Height:             Pertinent Labs & Imaging studies reviewed. (See chart for details)    ED Course as of Oct 15 0442   Sun Oct 14, 2018   2025 Discussed with Dr. Prudence Hsu. If CT is negative can go home. [KJ]      ED Course User Index  [KJ] Silverio Dennison, APRN - CNP       The patient was seen and evaluated in atimely manner for shortness of breath. His vital signs were stable .  During the physical exam

## 2018-10-15 NOTE — PROGRESS NOTES
Patient assessed for the following post chemotherapy:jevtana    Dizziness   No  Lightheadedness  No      Acute nausea/vomiting No  Headache   No  Chest pain/pressure  No  Rash/itching   No  Shortness of breath  No    Patient kept for 20 minutes observation post infusion chemotherapy. Patient tolerated chemotherapy treatment jevtana without any complications. Last vital signs:   /72   Pulse 88   Temp 98 °F (36.7 °C) (Oral)   Resp 18   SpO2 100%         Patient instructed if experience any of the above symptoms following today's infusion,he/she is to notify MD immediately or go to the emergency department. Discharge instructions given to patient. Verbalizes understanding. Ambulated off unit friend taylor in good condition with belongings.

## 2018-10-15 NOTE — PATIENT INSTRUCTIONS
1. Proceed with treatment today. Neulasta injection tomorrow  2.   He will return to clinic to be seen by Paulett Schlatter and for labs: CBC

## 2018-10-15 NOTE — PROGRESS NOTES
Name: Princess Reddy  : 1950  MRN: [id number]    Oncology Navigation Follow-Up Note    Contact Type:  Medical Oncology- met prostate cancer    Subjective: \"see doctor\"    Objective: In ER last pm for increased SOB. CT neg PE.  +Wheezes, & diminished breath sounds. Tx with nebulizer tx. & discharged home. Doing \"good\" at home-> caregiver Mookie Gillespie reports pt is able to walk without difficulty across the street to her home for his meals, & also drinks 2-3 ensure supplements per day. Pt sleeping in hosp bed now, with legs elevated. Swelling of BLE is significantly improved. Cont with Methadone 5 mg TID->no complaints of pain today, & is walking erect. Changes depends 4-5x per day. Desires chemotherapy tx today, stating, \"want to get it if doctor says I am ok to get it; & will have her recheck me to see if it is helping after the next 2 treatments\". Karthikeyan query if pt has discussed his wishes with Miss. Resendiz-->he & she verbalized they have talked so she knows what to do with the dog, \"Who\", if he needs to go to a nursing home, but pt does state, he \"really don't want to have to go there\". NO ECF choice discussed. ONC approves chemo tx today. WBC 3.1  HGB 8.6  Plt 186 K+ 3.8 BUN 13  Crea 0.5    Assistance Needed: denies; \"continue doing what I am doing\". Receptive to Advanced Care Planning / Palliative Care:  following    Referrals: Karthikeyan notified [facility] for Nebulizer unit, RXS: duoneb, zyprexa, & zofran->VM message left for Micheal Cantrell CM. Education: ONC discussed neb aerosols &, zyprexa effect    Notes: Plan Cycle #2 today: Lormitzy Shahs  Return tomorrow for Neulasta. RTC Friday for CBC.

## 2018-10-15 NOTE — PLAN OF CARE
Problem: Discharge Planning  Intervention: Discharge to appropriate level of care  Discuss understanding of discharge instructions, follow up appointments and when to call Physician. Goal: Knowledge of discharge instructions  Knowledge of discharge instructions     Outcome: Met This Shift  Verbalize understanding of discharge instructions, follow up appointments, and when to call Physician. Problem: Infection - Central Venous Catheter-Associated Bloodstream Infection:  Intervention: Infection risk assessment  Mediport site with no redness or warmth. Skin over port site intact with no signs of breakdown noted. Patient verbalizes signs/symptoms of port infection and when to notify the physician. Goal: Will show no infection signs and symptoms  Will show no infection signs and symptoms   Outcome: Met This Shift  Instructed to monitor for signs/symptoms of infection at mediport and call MD if problems develop.     Problem: Intellectual/Education/Knowledge Deficit  Intervention: Verbal/written education provided  Chemotherapy Administration    Pre-assessment Data: Antineoplastic Agents  Other:   See toxicity flow sheet for assessment [x]     Physician Notification of Concerns Related to Chemotherapy Administration:   Physician Notified Paulcha Late / Time of Notification      Interventions:   Lab work assessed  [x]   Height / Weight verified for dose [x]   Current MAR reviewed [x]   Emergency drugs available as appropriate [x]   Anaphylaxis assessment completed [x]   Pre-medications administered as ordered [x]   Blood return noted upon initiation of chemotherapy [x]   Blood return noted each 1-2ml of a vesicant medication if given IV push []   Blood return noted each 2-3ml of a non-vesicant medication if given IV push []   Monitor for signs / symptoms of hypersensitivity reaction [x]   Chemotherapy orders (drug/dose/rate) verified by 2 Chemo certified RNs [x]   Monitor IV site and blood return throughout the infusion of the medication [x]   Document IV site checks on the IV assessment form [x]   Document chemotherapy teaching on the Patient Education tab [x]   Document patient verbalizes understanding of medications being administered [x]   If IV infiltration, see ONS Guidelines []   Other:     jevtana []         Goal: Teaching initiated upon admission  Outcome: Met This Shift  Patient verbalizes understanding to verbal information given on jevtana,action and possible side effects. Aware to call MD if develop complications. Comments: Care plan reviewed with patient and taylor. Patient and taylor verbalize understanding of the plan of care and contribute to goal setting.

## 2018-10-15 NOTE — PROGRESS NOTES
Chemotherapy Administration    Pre-assessment Data: Antineoplastic Agents  Other:   See toxicity flow sheet for assessment [x]     Physician Notification of Concerns Related to Chemotherapy Administration:   Physician Notified Nicolas Martinez / Time of Notification      Interventions:   Lab work assessed  [x]   Height / Weight verified for dose [x]   Current MAR reviewed [x]   Emergency drugs available as appropriate [x]   Anaphylaxis assessment completed [x]   Pre-medications administered as ordered [x]   Blood return noted upon initiation of chemotherapy [x]   Blood return noted each 1-2ml of a vesicant medication if given IV push []   Blood return noted each 2-3ml of a non-vesicant medication if given IV push []   Monitor for signs / symptoms of hypersensitivity reaction [x]   Chemotherapy orders (drug/dose/rate) verified by 2 Chemo certified RNs [x]   Monitor IV site and blood return throughout the infusion of the medication [x]   Document IV site checks on the IV assessment form [x]   Document chemotherapy teaching on the Patient Education tab [x]   Document patient verbalizes understanding of medications being administered [x]   If IV infiltration, see ONS Guidelines []   Other:     jevtana []

## 2018-10-19 NOTE — PROGRESS NOTES
Navigation:     Kartihkeyan received phone call from pt caregiver, Jackson Tiffany. She informs that the [place] would not fill the RX for Zyprexa unless pt sees a psychiatrist.  Pt scheduled with a psychiatrist through the [facility] over $400.00 for a month's supply. Karthikeyan query on pt status since receiving chemo on Tuesday. Pt verbalizes he feels weaker than normal.    Karthikeyan update to Dr. Queen Evans; Request for CBC. Karthikeyan update to Jackson Allen to bring pt to OP Onc for a CBC. CBC drawn  HGB 9.3 today  HCT 27.2   Plt 212    Pt RTC on Monday for repeat labwork, & appt with Sofia MNEDOZA. Karthikeyan cont to follow to assist as needed.

## 2018-10-23 NOTE — ED PROVIDER NOTES
frequency. Musculoskeletal: Negative for arthralgias, back pain, gait problem, joint swelling, neck pain and neck stiffness. Skin: Negative for color change and rash. Allergic/Immunologic: Negative for immunocompromised state. Neurological: Negative for dizziness, tremors, weakness, light-headedness, numbness and headaches. Hematological: Does not bruise/bleed easily. Psychiatric/Behavioral: Negative for behavioral problems, confusion, decreased concentration, hallucinations, self-injury and suicidal ideas. The patient is not nervous/anxious. PAST MEDICAL HISTORY    has a past medical history of Backache; Cancer ([address] Goiter; Hyperlipidemia; Hypertension; Hypothyroidism; Incontinence of urine; Osteoarthritis; and Prostate cancer metastatic to liver [facility]). SURGICAL HISTORY      has a past surgical history that includes Prostate surgery (2011); Thyroid surgery (2011); Colonoscopy; pr esophagogastroduodenoscopy transoral diagnostic (N/A, 11/14/2017); pr colsc flx w/rmvl of tumor polyp lesion snare tq (N/A, 11/14/2017); and pr insj tunneled ctr vad w/subq port age 12 yr/> (N/A, 7/11/2018). CURRENT MEDICATIONS       Discharge Medication List as of 10/23/2018  8:14 AM      CONTINUE these medications which have NOT CHANGED    Details   ondansetron (ZOFRAN) 4 MG tablet Take 1 tablet by mouth every 8 hours as needed for Nausea or Vomiting, Disp-60 tablet, R-1Print      OLANZapine (ZYPREXA) 7.5 MG tablet Take 1 tablet by mouth nightly, Disp-30 tablet, R-3Print      ipratropium-albuterol (DUONEB) 0.5-2.5 (3) MG/3ML SOLN nebulizer solution Inhale 3 mLs into the lungs every 4 hours, Disp-360 mL, R-1Print      methadone (DOLOPHINE) 5 MG tablet Take 1 tablet by mouth every 6 hours for 30 days. Avril Vyas Date: 10/2/18, Disp-90 tablet, R-0Print      docusate sodium (COLACE, DULCOLAX) 100 MG CAPS Take 100 mg by mouth dailyOTC      levothyroxine (SYNTHROID) 125 MCG tablet Take 1 tablet by mouth Daily, errors which are inherent in voice recognition technology. **      Final report electronically signed by Dr. Leana Chapman on 10/23/2018 8:05 AM            LABS:   Labs Reviewed   CBC WITH AUTO DIFFERENTIAL - Abnormal; Notable for the following:        Result Value    WBC 18.1 (*)     RBC 2.63 (*)     Hemoglobin 8.2 (*)     Hematocrit 24.7 (*)     RDW-CV 21.9 (*)     RDW-SD 73.5 (*)     Segs Absolute 13.3 (*)     Monocytes # 1.6 (*)     Immature Grans (Abs) 1.35 (*)     All other components within normal limits   BASIC METABOLIC PANEL - Abnormal; Notable for the following:     Calcium 8.3 (*)     All other components within normal limits   APTT   PROTIME-INR   ANION GAP   GLOMERULAR FILTRATION RATE, ESTIMATED   OSMOLALITY   SCAN OF BLOOD SMEAR       EMERGENCY DEPARTMENT COURSE:   Vitals:    Vitals:    10/23/18 0637 10/23/18 0803   BP: 134/82 (!) 140/79   Pulse: 83 85   Resp: 21 20   Temp: 98.1 °F (36.7 °C)    TempSrc: Oral    SpO2: 100% 99%   Weight: 146 lb (66.2 kg)    Height: 6' 2\" (1.88 m)        MDM  The patient presents to the ED with complaints of hematemesis. The patient was not in any acute distress. The patient's physical exam showed soft and non-tender abdomen. Normal heart and lung sounds. Patient's status remained stable during the duration of their stay. Labs and imaging were ordered, and reviewed with the patient. WBC count was elevated at 18.1. Likely from neoplastic process. CXR showed no evidence of an acute process. No active bleeding with stable hgb. I explained my proposed course of treatment with the patient, and they were amenable to my decision. The patient will be discharged home with ocean nasal spray, and will need to follow-up with Elena Douglas MD at scheduled appointment in 3 days. The patient will need to come back to the ER if their symptoms worsen, or become more severe in nature. Medications - No data to display    Patient was seen independently by myself.  The patient's final

## 2018-10-25 NOTE — PLAN OF CARE
Problem: Intellectual/Education/Knowledge Deficit  Intervention: Verbal/written education provided  Patient educated blood product transfusion protocol:    Patient receiving blood:      - Blood product transfusion information sheet given: questions answered and consent signed  - Take vital signs/ monitor lungs sound prior to transfusion  - Monitor patient for 15 minutes after transfusion started  - Take vital signs / monitor lungs sound in 15 minutes and post transfusion  - Assess IV site   - Monitor patient closely for potential transfusion reaction    Call MD if develop complications once discharged. Goal: Teaching initiated upon admission  Outcome: Met This Shift  Patient verbalize understanding to  verbal and written information given  on blood transfusion,procedure ,and possible reaction. Instructed to call MD if develop any complications once discharged. Problem: Discharge Planning  Intervention: Discharge to appropriate level of care  Discuss discharge instructions, follow ups and when to call doctor. Goal: Knowledge of discharge instructions  Knowledge of discharge instructions    Outcome: Met This Shift  Verbalized understanding of discharge instructions, follow ups and when to call doctor    Problem: Infection - Central Venous Catheter-Associated Bloodstream Infection:  Intervention: Infection risk assessment  Discuss port maintenance, infection prevention, signs and when to call Dr    Goal: Will show no infection signs and symptoms  Will show no infection signs and symptoms  Outcome: Met This Shift  Mediport site with no redness or warmth. Skin over port intact with no signs of breakdown noted. Patient verbalizes signs/symptoms of port infection and when to notify the physician. Comments: Care plan reviewed with patient. Patient  verbalized understanding of the plan of care and contribute to goal setting.
Agree with resident note. Patient personally seen and examined. All current imaging studies reviewed.  for MR with/without gado  for med clearance and r craniotomy next week (pt was on asa/plavix)

## 2018-10-25 NOTE — PROGRESS NOTES
Patient verbalize understanding to  verbal and written information given  on blood transfusion,procedure ,and possible reaction. Consent obtained

## 2018-10-25 NOTE — PROGRESS NOTES
Patient tolerated transfusion of blood without any complications. Patient kept for 20 minutes observation post transfusion. Denies dizziness, lightheadedness, acute nausea or vomiting, headache, chest pain/pressure, rash/itching, or an increase in SOB. Last vital signs:   /74   Pulse 75   Temp 98 °F (36.7 °C)   Resp 18 Comment: lungs clear  SpO2 100%     Patient instructed if they experience any of the above symptoms following today's transfusion,he/she is to notify the physician immediately or go to the emergency department. Discharge instructions given to patient. Verbalizes understanding. Ambulated off unit per self.

## 2018-10-31 NOTE — PLAN OF CARE
Problem: Discharge Planning  Intervention: Discharge to appropriate level of care  Provide discharge instructions. Goal: Knowledge of discharge instructions  Knowledge of discharge instructions     Outcome: Met This Shift  Verbalized understanding of discharge instructions, follow-up appointments, and when to call the physician. Problem: Infection - Central Venous Catheter-Associated Bloodstream Infection:  Intervention: Infection risk assessment  Discussed mediport maintenance, infection prevention, and when to call the physician. Labs drawn. Goal: Will show no infection signs and symptoms  Will show no infection signs and symptoms   Outcome: Met This Shift  Mediport site with no redness or warmth. Skin over port intact with no signs of breakdown noted. Patient verbalizes signs/symptoms of port infection and when to notify the physician. Problem: Falls - Risk of: Intervention: Assess risk factors for falls  Discussed the need to use the call light for assistance when getting up to ambulate. Call light within reach. Goal: Will remain free from falls  Will remain free from falls   Outcome: Met This Shift  Free from falls while in O.P. Oncology. Comments: Care plan reviewed with patient and caregiver. Patient and caregiver verbalize understanding of the plan of care and contribute to goal setting.

## 2018-10-31 NOTE — PROGRESS NOTES
Patient assessed for the following post lab draw:    Dizziness   No  Lightheadedness  No     Acute nausea/vomiting No  Headache   No  Chest pain/pressure  No  Rash/itching   No  Shortness of breath  No    Patient tolerated lab draw per mediport without any complications. Last vital signs:   /62   Pulse 66   Temp 98.6 °F (37 °C) (Oral)   Resp 18   SpO2 100%     Patient instructed if experience any of the above symptoms following today's lab draw, he/she is to notify MD immediately or go to the emergency department. Discharge instructions given to patient. Verbalizes understanding. Ambulated off unit per self with caregiver with belongings.

## 2018-11-01 NOTE — TELEPHONE ENCOUNTER
Navigation:     Karthikeyan received phone call from Muna Carver, pt caregiver, sharing pt needs RX for Methadone 5mg 1 tab every 6 hours for pain. He is down to 24 tablets, & the RX must be given to the [place] to fill. Karthikeyan phoned OP ONC, spoke with Jerry Disla. She will enter the order, [facility] [address] [facility]. Nedra Gill will phone pt caregiver once the RX is signed for her to  to take to the [place] to fill. Muna Carver, caregiver also shares with Karthikeyan that the [place] has not yet provided the ZAINAB hose that  were ordered, faxed, followed by caregiver delivering the RX to the [id number] [place] [address] on 9-12-18. Muna Carver shares that Jul, [address] Julia Jerez would phone Karthikeyan if she is unable to locate the ZAINAB RX. NO phone call from [facility] to present re: the ZAINAB hose. Karthikeyan cont to follow to assist as needed.

## 2018-11-06 NOTE — PROGRESS NOTES
Patient assessed for the following post chemotherapy:    Dizziness   No  Lightheadedness  No      Acute nausea/vomiting No  Headache   No  Chest pain/pressure  No  Rash/itching   No  Shortness of breath  No    Patient kept for 20 minutes observation post infusion chemotherapy. Patient tolerated chemotherapy treatment Jevtana without any complications. Last vital signs:   /70   Pulse 78   Temp 98.8 °F (37.1 °C) (Oral)   Resp 16   SpO2 95%       Patient instructed if experience any of the above symptoms following today's infusion,he/she is to notify MD immediately or go to the emergency department. Discharge instructions given to patient. Verbalizes understanding. Ambulated off unit per self in stable condition accompanied by caregiver with belongings.

## 2018-11-06 NOTE — PLAN OF CARE
Problem: Discharge Planning  Intervention: Discharge to appropriate level of care  Discuss understanding of discharge instructions, follow up appointments and when to call Physician. Goal: Knowledge of discharge instructions  Knowledge of discharge instructions     Outcome: Met This Shift  Verbalize understanding of discharge instructions, follow up appointments, and when to call Physician. Problem: Falls - Risk of: Intervention: Assess risk factors for falls  Discussed the need to use the call light for assistance when getting up to ambulate. Goal: Will remain free from falls  Will remain free from falls   Outcome: Met This Shift  Free from falls while in O.P. Oncology. Problem: Infection - Central Venous Catheter-Associated Bloodstream Infection:  Intervention: Infection risk assessment  Mediport site with no redness or warmth. Skin over port site intact with no signs of breakdown noted. Patient verbalizes signs/symptoms of port infection and when to notify the physician. Goal: Will show no infection signs and symptoms  Will show no infection signs and symptoms   Outcome: Met This Shift  Instructed to monitor for signs/symptoms of infection at medi-port site and call MD if problems develop.     Problem: Intellectual/Education/Knowledge Deficit  Intervention: Verbal/written education provided  Chemotherapy Teaching     What is Chemotherapy   Drug action [x]   Method of Administration [x]   Handouts given []     Side Effects  Nausea/vomiting [x]   Diarrhea [x]   Fatigue [x]   Signs / Symptoms of infection [x]   Neutropenia [x]   Thrombocytopenia [x]   Alopecia [x]   neuropathy [x]   [place] diet &  the importance of fluids [x]       Micellaneous  Importance of nutrition [x]   Importance of oral hygiene [x]   When to call the MD [x]   Monitoring labs [x]   Use of supportive services []     Explanation of Drug Regimen / Frequency  Jevtana     Comments  Verbalized understanding to drug,action,side effects and when to call MD       Goal: Teaching initiated upon admission  Outcome: Met This Shift  Patient verbalizes understanding to verbal information given on Jevtana,action and possible side effects. Aware to call MD if develop complications. Comments: Care plan reviewed with patient and caregiver. Patient and caregiver verbalize understanding of the plan of care and contribute to goal setting.

## 2018-11-06 NOTE — ONCOLOGY
Chemotherapy Administration    Pre-assessment Data: Antineoplastic Agents  Other:   See toxicity flow sheet for assessment [x]     Physician Notification of Concerns Related to Chemotherapy Administration:   Physician Notified Jeanne Good / Time of Notification      Interventions:   Lab work assessed  [x]   Height / Weight verified for dose [x]   Current MAR reviewed [x]   Emergency drugs available as appropriate [x]   Anaphylaxis assessment completed [x]   Pre-medications administered as ordered [x]   Blood return noted upon initiation of chemotherapy [x]   Blood return noted each 1-2ml of a vesicant medication if given IV push []   Blood return noted each 2-3ml of a non-vesicant medication if given IV push []   Monitor for signs / symptoms of hypersensitivity reaction [x]   Chemotherapy orders (drug/dose/rate) verified by 2 Chemo certified RNs [x]   Monitor IV site and blood return throughout the infusion of the medication [x]   Document IV site checks on the IV assessment form [x]   Document chemotherapy teaching on the Patient Education tab [x]   Document patient verbalizes understanding of medications being administered [x]   If IV infiltration, see ONS Guidelines []   Other:     Enriqueta Richardson []

## 2018-11-07 NOTE — PLAN OF CARE
Problem: Intellectual/Education/Knowledge Deficit  Intervention: Verbal/written education provided  Verbal education provided on PRBCs transfusion. Pt states understanding. Written consent obtained. Goal: Teaching initiated upon admission  Outcome: Met This Shift  Patient verbalize understanding to  verbal and written information given  on PRBCs transfusion, procedure, and possible reaction. Instructed to call MD if develop any complications once discharged. Problem: Discharge Planning  Intervention: Discharge to appropriate level of care  Discharge instructions given to pt and reviewed. Follow up appointments reviewed. Goal: Knowledge of discharge instructions  Knowledge of discharge instructions     Outcome: Met This Shift  Patient verbalizes understanding of discharge instructions, follow up appointment, and when to call physician if needed    Problem: Falls - Risk of: Intervention: Assess risk factors for falls  Fall risk factors and precautions reviewed with pt. Assistance provided as needed with activity. Call light within reach. Goal: Will remain free from falls  Will remain free from falls   Outcome: Met This Shift  Pt free of falls this visit. Problem: Infection - Central Venous Catheter-Associated Bloodstream Infection:  Intervention: Infection risk assessment  Discuss port maintenance, infection prevention, signs of infection and when to call the doctor. Goal: Will show no infection signs and symptoms  Will show no infection signs and symptoms   Outcome: Met This Shift  Mediport site with no redness or warmth. Skin over port site intact with no signs of breakdown noted. Patient verbalizes signs/symptoms of port infection and when to notify the physician. Comments: Care plan reviewed with patient. Patient verbalizes understanding of the plan of care and contributes to goal setting.

## 2018-11-07 NOTE — PROGRESS NOTES
Patient tolerated transfusion of packed red blood cells without any complications. Patient kept for 20 minutes observation post transfusion. Denies dizziness, lightheadedness, acute nausea or vomiting, headache, chest pain/pressure, rash/itching, or an increase in SOB. Last vital signs:   BP (!) 107/58   Pulse 83   Temp 98.3 °F (36.8 °C) (Oral)   Resp 16 Comment: lungs clear  SpO2 100%     Patient instructed if they experience any of the above symptoms following today's transfusion,he is to notify the physician immediately or go to the emergency department. Discharge instructions given to patient. Verbalizes understanding. Ambulated off unit per self accompanied by caregiver.

## 2018-11-07 NOTE — PROGRESS NOTES
Patient educated blood product transfusion protocol:    Patient receiving 2 units packed red blood cells:      - Blood product transfusion information sheet given: questions answered and consent signed  - Take vital signs/ monitor lungs sound prior to transfusion  - Monitor patient for 15 minutes after transfusion started  - Take vital signs / monitor lungs sound in 15 minutes and post transfusion  - Assess IV site   - Monitor patient closely for potential transfusion reaction    Call MD if develop complications once discharged.

## 2018-11-10 NOTE — PROGRESS NOTES
ill-defined increased activity in the skull, right frontal region.       Mottled increased activity on most of the ribs. Similar finding can be seen with the vertebral bodies particularly from the anterior posterior projection this is well seen in the lower lumbar bodies. Increased activity in the proximal femora bilaterally    in addition to more focal areas of increased activity in the femoral diaphyses findings are consistent with widespread prostatic disease which can be seen on the patient's recent CT scan abdomen pelvis. While in the hospital the patient received [place] [facility] and was started on Casodex. In November 2017 he underwent cystoscopy, no bladder abnormality was found. Sometime in December 2017, January 2018, he started Randeen Prior injection. From January till May 2018 he continued  Firmagon injections. However his PSA was increasing very quickly. His PSA doubled between March 9, 2018 and April 25, 2018. Most recent PSA on May 21, 2018 was 574. Sometime in May the patient was started on XTANDI. On 5/27/18 pt presented to [facility] ER for weakness and pain. Palliative chemotherapy with Taxotere started on July 12, 2018. He was hospitalized from September 13 until September 19, 2018 for:             1.  Altered sensorium with EEG unremarkable, probably metabolic              encephalopathy from urinary tract infection. 2.  Metastatic prostate cancer. 3.  Elevated troponin's, stress test negative. 4.  Elevated liver enzymes, probably from liver metastasis. 5.  Anemia. 6.  Leukopenia. 7.  Hypothyroidism. TSH was increased and Synthroid was adjusted. Interim history on 11/05/2018: Today the patient reports no nausea nor vomiting since being discharged from the hospital.  He had a bowel movement yesterday. He denies having any fevers. Denies any active bleeding.   He reports grade 1 fatigue, but denies any new shortness of 128.2  March 2018: 132  April 2018: 361  May 2018: 574  June 2018: 949  September 2018: 718.2    Assessment/Plan:        Diagnosis Orders   1. Prostate cancer (HCC)  PSA, Prostatic Specific Antigen    Type And Screen    DISCONTINUED: leuprolide (LUPRON) injection 7.5 mg   2. Cancer, metastatic to bone ([facility] [address]     3. Cancer related pain     4. Severe malnutrition (HCC)  DISCONTINUED: 0.9 % sodium chloride infusion    DISCONTINUED: famotidine (PEPCID) injection 20 mg    DISCONTINUED: sodium chloride (PF) 0.9 % injection 10 mL    DISCONTINUED: diphenhydrAMINE (BENADRYL) injection 50 mg    DISCONTINUED: dexamethasone (DECADRON) 10 mg in sodium chloride 0.9 % IVPB    DISCONTINUED: cabazitaxel (JEVTANA) 46.5 mg in sodium chloride 0.9 % 250 mL chemo IVPB    DISCONTINUED: pegfilgrastim (NEULASTA) on-body injector 6 mg   5. Anemia, unspecified type     6. Encounter for chemotherapy management     7. [place] [id number] Therapy            1.Metastatic prostate cancer. The patient has progressed on two lines ADT with Casodex and recently XTANDI. His PSA almost doubles every month. The patient didn't have any imaging studies since January 2018. Therefore he had CT of the chest abdomen pelvis and bone scan, they confirmed extensive skeletal involvement and retroperitoneal lymphadenopathy. Treatment for patients with disseminated prostate cancer that has progressed on androgen deprivation therapy and abiraterone is Taxotere chemotherapy in combination with prednisone that has been shown to significantly prolong overall survival. The  potential toxicities associated with Taxotere (myelosuppression, febrile infections, neuropathy) might be difficult to monitor in the patient who was noncompliant in the past.  Currently he has very good social support from his neighbor Mrs. Henderson. Started palliative chemotherapy with Taxotere on July 12, 2018.   Imaging studies performed after 3 cycles of Taxotere and prednisone showed evidence of disease

## 2018-11-12 NOTE — PROGRESS NOTES
Patient tolerated  Lab draw without any complications. Discharge instructions given to patient-verbalizes understanding. Ambulated off unit per self with belongings.

## 2018-11-12 NOTE — PLAN OF CARE
Problem: Infection - Central Venous Catheter-Associated Bloodstream Infection:  Intervention: Infection risk assessment  Mediport site with no redness or warmth. Skin over port site intact with no signs of breakdown noted. Patient verbalizes signs/symptoms of port infection and when to notify the physician. Goal: Will show no infection signs and symptoms  Will show no infection signs and symptoms   Outcome: Met This Shift  Instructed to monitor for signs/symptoms of infection at mediport and call MD if problems develop. Comments: Care plan reviewed with patient . Patient  verbalize understanding of the plan of care and contribute to goal setting.

## 2018-11-16 PROBLEM — E78.00 PURE HYPERCHOLESTEROLEMIA: Status: ACTIVE | Noted: 2018-01-01

## 2018-11-16 PROBLEM — R06.02 SOB (SHORTNESS OF BREATH) ON EXERTION: Status: ACTIVE | Noted: 2018-01-01

## 2018-11-16 PROBLEM — R60.0 BILATERAL LEG EDEMA: Status: ACTIVE | Noted: 2018-01-01

## 2018-11-16 PROBLEM — Z87.891 EX-SMOKER FOR MORE THAN 1 YEAR: Status: ACTIVE | Noted: 2018-01-01

## 2018-11-26 NOTE — PROGRESS NOTES
Pt tolerated lab draw via mediport. Pt ambulated off unit to exam room for appointment with Dr. Sowmya Hernandez accompanied by caregiver and LPN.

## 2018-11-26 NOTE — PROGRESS NOTES
nasal spray 1 spray by Nasal route as needed for Congestion 1 Bottle 3    ondansetron (ZOFRAN) 4 MG tablet Take 1 tablet by mouth every 8 hours as needed for Nausea or Vomiting 60 tablet 1    OLANZapine (ZYPREXA) 7.5 MG tablet Take 1 tablet by mouth nightly 30 tablet 3    ipratropium-albuterol (DUONEB) 0.5-2.5 (3) MG/3ML SOLN nebulizer solution Inhale 3 mLs into the lungs every 4 hours 360 mL 1    Misc. Devices ([address]) MISC 1 each by Does not apply route once for 1 dose 1 each 0    docusate sodium (COLACE, DULCOLAX) 100 MG CAPS Take 100 mg by mouth daily      levothyroxine (SYNTHROID) 125 MCG tablet Take 1 tablet by mouth Daily 30 tablet 0    Magic Mouthwash (MIRACLE MOUTHWASH) Swish and spit 5 mLs 4 times daily as needed for Irritation Prepare mixture 1:1:1 DIPHENHYDRAMINE HCL,NYSTATIN,LIDOCAINE  mL 1    lidocaine-prilocaine (EMLA) 2.5-2.5 % cream Apply topically as needed. 30 g 1    Misc. Devices (COMMODE BEDSIDE) MISC 1 Units by Does not apply route daily 1 each 0    dexamethasone (DECADRON) 4 MG tablet Take 2 tabs (8 mg) by mouth 2 times daily for three days beginning 1 day prior to scheduled DOCEtaxel administration. 12 tablet 2    vitamin B-6 (PYRIDOXINE) 100 MG tablet Take 100 mg by mouth daily      atenolol (TENORMIN) 25 MG tablet Take 25 mg by mouth daily      Cholecalciferol (VITAMIN D-3) 1000 units CAPS Take 1 capsule by mouth daily      acetaminophen (TYLENOL) 325 MG tablet Take 2 tablets by mouth every 4 hours as needed for Pain 120 tablet 3    Multiple Vitamins-Minerals (MENS MULTI VITAMIN & MINERAL PO) Take by mouth      pravastatin (PRAVACHOL) 40 MG tablet Take 40 mg by mouth daily. No current facility-administered medications for this visit.       Facility-Administered Medications Ordered in Other Visits   Medication Dose Route Frequency Provider Last Rate Last Dose    sodium chloride flush 0.9 % injection 10 mL  10 mL Intercatheter PRN Cecilio Zamudio MD   10 mL at 11/26/18 0910    sodium chloride flush 0.9 % injection 20 mL  20 mL Intercatheter PRN Yomi Magaña MD   20 mL at 11/26/18 0911    heparin flush 100 UNIT/ML injection 500 Units  500 Units Intercatheter PRN Yomi Magaña MD   500 Units at 11/26/18 1111      No Known Allergies   Health Maintenance   Topic Date Due    Hepatitis C screen  1950    DTaP/Tdap/Td vaccine (1 - Tdap) 10/25/1970    Shingles Vaccine (1 of 2 - 2 Dose Series) 10/25/2001    Pneumococcal high/highest risk (1 of 2 - PCV13) 10/25/2016    Flu vaccine (1) 09/01/2018    TSH testing  10/07/2019    Potassium monitoring  10/23/2019    Creatinine monitoring  10/23/2019    Lipid screen  09/14/2023    Colon cancer screen colonoscopy  11/14/2027    AAA screen  Completed        Subjective:   Review of Systems   Constitutional: Positive for appetite change, fatigue and unexpected weight change. Negative for activity change and fever. HENT: Negative for congestion, dental problem, facial swelling, hearing loss, mouth sores, nosebleeds, sore throat, tinnitus and trouble swallowing. Eyes: Negative for discharge and visual disturbance. Respiratory: Negative for cough, chest tightness, shortness of breath and wheezing. Cardiovascular: Negative for chest pain, palpitations and leg swelling. Gastrointestinal: Positive for constipation and nausea. Negative for abdominal distention, abdominal pain, blood in stool, diarrhea, rectal pain and vomiting. Endocrine: Negative for cold intolerance, polydipsia and polyuria. Genitourinary: Positive for difficulty urinating (incontinence). Negative for decreased urine volume, dysuria, flank pain, hematuria and urgency. Musculoskeletal: Positive for back pain. Negative for arthralgias, gait problem, joint swelling, myalgias and neck stiffness. Skin: Negative for color change, rash and wound. Neurological: Positive for weakness.  Negative for dizziness, tremors, seizures, speech difficulty,

## 2018-11-26 NOTE — PROGRESS NOTES
Pt returned to infusion unit from appointment with Dr. Heather Ovalle. No plans for treatment today.

## 2018-11-26 NOTE — PROGRESS NOTES
Pt tolerated lab draw via mediport without any complications. Discharge instructions given to patient. Patient verbalizes understanding. Pt ambulated off unit per self accompanied by caregiver with belongings.

## 2018-11-27 NOTE — ED PROVIDER NOTES
dictations but occasionally words are mis-transcribed.)    The patient was given an opportunity to see the Emergency Attending. The patient voiced understanding that I was a Mid-Level Provider and was inagreement with being seen independently by myself. Scribe:  Benji Anguiano 11/27/18 8:35 AM Scribing for and in the presence of Nava Lowry PA-C. Signed by: Kathy Mayen, 11/27/18 5:19 PM    Provider:  I personally performed the services described in thedocumentation, reviewed and edited the documentation which was dictated to the scribe in my presence, and it accurately records my words and actions.     Nava Lowry PA-C 11/27/18 5:19 PM        REJI Babb  11/27/18 REJI Barrow  11/27/18 [id number]

## 2018-11-27 NOTE — ED NOTES
Pt assisted with urinal to void. Pt up to bedside commode to have a bowel movement.       Oswaldo Lopez RN  11/27/18 [id number]

## 2018-11-30 NOTE — PROGRESS NOTES
Verbalized understanding of mediport care and maintenance and signs of infection to monitor for after discharge,  discharge instructions, follow-up appointments, and when to call the physician. Patient off the unit per self accompanied by care giver with personal belongings.

## 2018-11-30 NOTE — PROGRESS NOTES
Patient off the unit to office appointment up per self with care giver, accompanied by Eric Spear LPN at this time.

## 2018-11-30 NOTE — PLAN OF CARE
Problem: Falls - Risk of: Intervention: Assess risk factors for falls  Verbalized understanding of fall prevention to ask for assistance with ambulation. Call light within reach. Goal: Will remain free from falls  Will remain free from falls   Outcome: Met This Shift  No falls occurred this visit. Comments: Care plan reviewed with patient and care giver. Patient and care giver verbalize understanding of the plan of care and contribute to goal setting.

## 2018-12-05 NOTE — TELEPHONE ENCOUNTER
Navigation:     Pt caregiver, Joseron Mehul phoned Karthikeyan to update. Pt is eating, is oriented, & is able to ambulate without difficulty. Pain is being managed, however, pt is complaining of the \"hernia pain\". Pt query to Muna Carver is he can get the \"hernia surgery\" now since the chemo has been stopped. Pt not likely to be a surgical candidate. Karthikeyan will update Dr. Angy Roberson. Pt cont to decline hospice services. Muna Carver, caregiver has notified pt's family of pt's disease progression.  arrangements are in place with [facility] for cremation. .  Pt son plans to take pt's remains to [facility] for burial.     Karthikeyan cont to be available to assist as needed.

## 2018-12-05 NOTE — TELEPHONE ENCOUNTER
Patient complaining of hernia pain and wondering if surgery is an option since patient stopped chemo. Please advise, thank you!

## 2018-12-10 NOTE — ED TRIAGE NOTES
Pt to er. Pt c/o fever and generalized body aches that started earlier tonight. Pt also c/o RLQ abd pain that's been ongoing. Pt was dx with prostate CA in 2011 and has metastasized to liver, bone and kidneys. Denies N/V/D. Assessment completed. Resp easy and regular. Neighbor at bedside. Neighbor states gave pt tylenol at 2230 tonight for fever of 100.6. CA doctor and pt decided to stop the chemo treatments because it \"ate up his liver. \" Dr. Uzma Olivas at bedside to eval pt.

## 2018-12-16 PROBLEM — Z51.5 END OF LIFE CARE: Status: ACTIVE | Noted: 2018-01-01

## 2018-12-16 NOTE — H&P
Historical Provider, MD   pravastatin (PRAVACHOL) 40 MG tablet Take 40 mg by mouth daily. Historical Provider, MD       Allergies:  Patient has no known allergies. Social History:      The patient currently lives at home    TOBACCO:   reports that he quit smoking about 37 years ago. His smoking use included Cigarettes, Pipe, and Cigars. He has never used smokeless tobacco.  ETOH:   reports that he does not drink alcohol. Family History:          Family History   Problem Relation Age of Onset    High Blood Pressure Mother     Diabetes Mother     Cancer Father         throat       Diet:       REVIEW OF SYSTEMS:   Pertinent positives as noted in the HPI. All other systems reviewed and negative. PHYSICAL EXAM:    BP (!) 92/51   Pulse 88   Resp 12   Ht 6' 2\" (1.88 m)   Wt 167 lb (75.8 kg)   BMI 21.44 kg/m²     General appearance: comfortable  HEENT:  Normal cephalic, atraumatic Neck: Supple, with full range of motion. No jugular venous distention. Trachea midline. Respiratory:  Normal respiratory effort. Clear to auscultation,   Cardiovascular:  Regular rate and rhythm with normal S1/S2   Abdomen: Soft,  with normal bowel sounds. Skin: warm  Neurologic:  Grossly non-focal.  Capillary Refill: Brisk,< 3 seconds   Peripheral Pulses: +2 palpable, equal bilaterally       Labs:     No results for input(s): WBC, HGB, HCT, PLT in the last 72 hours. No results for input(s): NA, K, CL, CO2, BUN, CREATININE, CALCIUM, PHOS in the last 72 hours. Invalid input(s): MAGNES  No results for input(s): AST, ALT, BILIDIR, BILITOT, ALKPHOS in the last 72 hours. No results for input(s): INR in the last 72 hours. No results for input(s): Amedeo [place] in the last 72 hours.     Urinalysis:      Lab Results   Component Value Date    NITRU NEGATIVE 12/10/2018    WBCUA 2-4 12/10/2018    BACTERIA FEW 12/10/2018    RBCUA 0-2 12/10/2018    BLOODU NEGATIVE 12/10/2018    SPECGRAV 1.024 12/10/2018    GLUCOSEU NEGATIVE

## 2018-12-17 NOTE — PROGRESS NOTES
CHILANGO Phelps to [facility] for patient personal care. Noted to be grimacing and some moaning at times. Nurse Stuart Leach RN administering morphine 10mg for pain. Pt with difficulty in swallowing the 10mg/5ml concentrate. New order received from Dr. Elvira Ayoub to convert to 20mg/ml concentrated oral solution. Pt tolerating bath well. Lorazepam 2mg/ml requested from pharmacy and dose to be given when arrives to floor. Assisted HHA with bathing and turning patient. Patient answered questions with mumbling yes or no.
Nutrition Assessment    Type and Reason for Visit: positive nutrition screen for weight loss, poor po    Nutrition Assessment:  · Subjective Assessment: no nutrition dx (hospice)    Nutrition Risk Level   Risk Level:   (due to hospice status). Nutrition Intervention  Food and/or Delivery: diet as tolerated  Nutrition Education/Counseling/Coordination of Care: RD available prn    RD available, consult as needed.      Electronically signed by Jess Burkitt, RD, LD on 12/17/18 at 9:55 AM    Contact Number: [phone]
controlled can use IV push morphine 4mg q2hrs prn. Lorazepam order adjusted to Q3hrs prn. All as needed medications to be adminstered for patient comfort. Will monitor need for medications and adjust accordingly. Told primary nurse Stuart Leach RN that can call this nurse if needed.

## 2018-12-17 NOTE — FLOWSHEET NOTE
12/17/18 1621   Encounter Summary   Services provided to: Patient   Referral/Consult From: [address] [facility]. Continue Visiting Yes  (12/17 yes for hospice care.)   Complexity of Encounter Low   Length of Encounter 15 minutes   Grief and Life Adjustment   Type Hospice   Assessment Sleeping   Intervention Prayer   Outcome Did not respond     Hospice chaplain made an initial visit to offer spiritual support. Patient rested in bed, asleep. Quiet prayers were offered on his behalf. Spiritual care remains available.

## 2018-12-18 NOTE — PLAN OF CARE
Problem: Falls - Risk of:  Goal: Will remain free from falls  Will remain free from falls   Outcome: Ongoing  Fall assessment completed. Bed alarm on. Side rails up x3 and bed in lowest position. Problem: Discharge Planning:  Goal: Participates in care planning  Participates in care planning   Outcome: Ongoing  Patient's caregiver active in care planning and updated this shift. Problem: Pain:  Goal: Pain level will decrease  Pain level will decrease   Outcome: Ongoing  Continuing to monitor and give medications per order. Comments: Care plan reviewed with patient's POA. Patient's POA verbalized understanding of the plan of care and contribute to goal setting.

## 2018-12-18 NOTE — FLOWSHEET NOTE
Stopped to make introduction to patient and family. Patient was asleep; no family present. Offered quiet prayer bedside.         12/17/18 5822   Encounter Summary   Services provided to: Patient   Complexity of Encounter Low   Length of Encounter 15 minutes

## 2018-12-20 NOTE — DISCHARGE SUMMARY
Hospital Death Summary    Patient's PCP: Rianna Mitchell DO  Admit Date: 2018    Date: 2018    Admitting Physician: Dr. Alejo Bradley MD  Discharge Physician: Dr. Cira Lopez:   None    Brief HPI: Patient admitted for end-of-life care. Brief hospital course: Pt with hx of metastatic prostate cancer admitted for end-of-life care. He was placed on comfort care meds, including morphine and anxiolytics. He was later pronounced  at  [address] hour on 2018. Preliminary cause of death is metastatic prostate cancer. Invasive procedures:  None. Discharge Diagnoses:   Principal Problem:    End of life care  Active Problems:    Prostate cancer [facility])    Metastatic cancer to spine [facility])    Essential hypertension  Resolved Problems:    * No resolved hospital problems. *      Physical Exam: BP (!) 101/57   Pulse 100   Temp 98.6 °F (37 °C) (Oral)   Resp 14   Ht 6' 2\" (1.88 m)   Wt 167 lb (75.8 kg)   SpO2 (!) 80%   BMI 21.44 kg/m²   Not present in hospital to evaluate at the time of death. Significant diagnostic studies that may require follow up  NA    Treatments: As above. Discharge Medications:  N/A    Disposition: Patient is . Follow Up:   No follow-up provider specified. Code status:  DNR-CC    Total time spent on discharge, finalizing medications, referrals and arranging outpatient follow up was more than 5 minutes. Thank you Dr. Rianna Mitchell DO for the opportunity to be involved in this patients care.

## 2019-11-05 NOTE — PROGRESS NOTES
Hospitalist Progress Note      Patient:  Joann Leavitt .      Unit/Bed:5K-02/002-A    YOB: 1950    MRN: [id number]       Acct: [de-identified]     PCP: Brianna Mesa DO    Date of Admission: 11/11/2017    Assessment/Plan:    1. Anemia of chronic disease -- no signs of acute loss -- nutritional component also and likely malignancy -- s/p transfused 1 unit since admission and hgb up to 8.1 on 11/13 from 6.5 on admission - cont monitor-- heme/onc consulted - checked FOB 11/13 (-)  -- iron studies 11/11/17 ok  2. Bladder wall thickening -- urology consulted - ?outpt cysto per urology note  3. UTI -- POA -- cipro started 11/13 - await cx -- treat given #2, 4  4. Prostate cancer s/p RALRP 2011, XRT for PSA failure -- PSA again up to 600's and Bone scan with +multifocal osseous metastatic dz   -- oncology consulted per urology --> pt states he does NOT follow with oncology through the [address] thus await further oncology recs  -- per urology pt failed f/u in 2015 when PSA was rising again - per urology note \"it appears he received one injection of Scarlet Minh in the office, and called in and reported he could not afford the medication. Our office made several attempts to contact and get assistance for patient, but he has not followed up since 2015. \"  -- started firmagon and started casodex 11/12   5. Retroperitoneal lymphadenopathy -- ?due to ? Recurrence/mets with #4 -- c/s GI to r/o GI malignancy causing wt loss and lymphadenopathy and EGD/colonoscopy 11/14/17 (p)  6. ++ bone mets -- noted per CT abd 11/10/17 and bone scan 11/13/17 with multiple areas of metastatic dz  -- urology consulted with above and oncology consulted per urology - await recs   7. Severe malnutrition -- dieticians consulted  8. Weight loss -- likely cancer related - c/s GI however with anemia, poor po, wt loss. -- TSH elevated thus less likely contributing  9.  Hypokalemia -- likely nutritional - replace and monitor - Mg normal 11/10/17  10. Elevated alk Phos -- likely bone related with ?bone mets per CT abd -- rest of LFT WNL  11. Essential HTN -- cont atenolol - monitor and adjust prn  12. Hypothyroidism -- TSH up 11/11/17 -- synthroid cont -- ?compliant -- ?need to increase dose - f/u pcp  13. Thrombocytosis -- likely due to anemia - iron studies 11/11 wNL  14. HLP --statin  15. Hx CKD stage 1 -- follows with dr Gill Jerome however renal fxn is normal - monitor  16. OA  17. Hx colon polyp 2014 -- c/s GI 11/13 w/u with above to r/o any GI malignancy to contribute to above    Dispo  -- 11/14 --> apprec consultants -- EGD/colonoscopy this pm - cont monitor h/h, lytes, BP -- await further urology, oncology recs     -- 11/13 --> apprec urology assist - consulted oncology and await recs -- will c/s GI to r/o GI etiology of wt loss, metastatic dz -- bone scan (p) -- casodex and firmagon started per urology -- start cipro for abn u/a and + sx  -- cont monitor h/h, BP, lytes, renal fxn       Chief Complaint: weight loss    Hospital Course: [place] W Lenny Delacruz. is a 78 y.o. male with hx prostate cancer, HLP, HTN, OA, goiter/hypothyroidism presenting with weight loss and found to be anemic with hgb 6.5 - pt admitted for further eval and tx.  -- urology consulted for bladder lesion  -- transfused 1 unit PRBC on admission. -- oncology consulted by urology for abn CT abd with probable bone mets -- bone scan 11/13/17 with multiple areas of osseous metastatic dz    Subjective:   -- 11/14 --> pt quit taking prep for colonoscopy - awaiting EGD/C-scope this pm.  Denies any pain. No lightheaded/dizziness. Poor appetite but per staff he is taking all the food he can get. Denies cp, sob. Denies n/v. Denies f/c.  Carlo po. On RA. Afebrile. No noted blood in stool or melena.       Medications:  Reviewed    Infusion Medications    Scheduled Medications    ciprofloxacin  500 mg Oral 2 times per day    pneumococcal 13-valent conjugate  0.5 mL Intramuscular Prior to discharge    influenza virus vaccine  0.5 mL Intramuscular Once    bicalutamide  50 mg Oral Daily    pravastatin  40 mg Oral Nightly    therapeutic multivitamin-minerals  1 tablet Oral Daily    levothyroxine  150 mcg Oral QAM AC    atenolol  25 mg Oral Daily    aspirin  325 mg Oral Q48H    sodium chloride flush  10 mL Intravenous 2 times per day     PRN Meds: sodium chloride flush, sodium chloride flush, potassium chloride **OR** potassium chloride **OR** potassium chloride, acetaminophen, magnesium hydroxide, ondansetron      Intake/Output Summary (Last 24 hours) at 11/14/17 0750  Last data filed at 11/14/17 0530   Gross per 24 hour   Intake             2680 ml   Output                0 ml   Net             2680 ml       Diet:  Dietary Nutrition Supplements: Standard High Calorie Oral Supplement  Dietary Nutrition Supplements: Frozen Oral Supplement  DIET CLEAR LIQUID;    Exam:  /66   Pulse 77   Temp 98.7 °F (37.1 °C) (Oral)   Resp 16   Ht 6' 1.5\" (1.867 m)   Wt 139 lb 3.2 oz (63.1 kg)   SpO2 99%   BMI 18.12 kg/m²     General appearance: No apparent distress, appears stated age and cooperative. HEENT: Pupils equal, round, and reactive to light. Conjunctivae/corneas clear. Neck: Supple, with full range of motion. No jugular venous distention. Trachea midline. Respiratory:  Normal respiratory effort. Clear to auscultation, bilaterally without Rales/Wheezes/Rhonchi. No respiratory distress or accessory muscle use. Cardiovascular: Regular rate and rhythm with normal S1/S2 without murmurs, rubs or gallops. Abdomen: Soft, non-tender, non-distended with normal bowel sounds. No rebound or guarding  Musculoskeletal: No clubbing, cyanosis or edema bilaterally. Full range of motion without deformity. No calf tenderness palpation  Skin: Skin color, texture, turgor normal.  No rashes or lesions. Neurologic:  Neurovascularly intact without any focal sensory/motor deficits.  Cranial nerves: II-XII Religion, Halal food, speaks Nepali

## 2020-04-03 NOTE — PROGRESS NOTES
After multiple explanation and reviewing of chemotherapy and its possible side effects patient continues to express the desire to continue on with chemotherapy. No

## 2021-10-05 NOTE — PROGRESS NOTES
Patient returned to infusion area in stable condition after seeing Physician. Edel Gupta [facility]

## 2022-11-06 NOTE — ED NOTES
Pt resting in bed. Resp easy and regular. Denies needs. Friend at bedside. Call light in reach.       Mary Paez RN  12/10/18 6925
negative - No discharge, No redness

## 2023-02-28 NOTE — PROGRESS NOTES
Patient assessed for the following post IV fluids:    Dizziness   No  Lightheadedness  No      Acute nausea/vomiting No  Headache   No  Chest pain/pressure  No  Rash/itching   No  Shortness of breath  No    Patient tolerated IV fluids without any complications. Last vital signs:   /74   Pulse 70   Temp 97.9 °F (36.6 °C) (Oral)   Resp 20   SpO2 100%     Patient instructed if he experiences any of the above symptoms following today's infusion,he is to notify MD immediately or go to the emergency department. Discharge instructions given to patient. Verbalizes understanding. Ambulated off unit per self with belongings. Nsaids Pregnancy And Lactation Text: These medications are considered safe up to 30 weeks gestation. It is excreted in breast milk.

## (undated) DEVICE — 3M™ BAIR HUGGER® MULTI ACCESS BLANKET, PEDIATRIC, FULL BODY, 10 PER CASE 31000: Brand: BAIR HUGGER™

## (undated) DEVICE — 3M™ TEGADERM™ TRANSPARENT FILM DRESSING FRAME STYLE, 1626W, 4 IN X 4-3/4 IN (10 CM X 12 CM), 50/CT 4CT/CASE: Brand: 3M™ TEGADERM™

## (undated) DEVICE — BREAST HERNIA PACK: Brand: MEDLINE INDUSTRIES, INC.

## (undated) DEVICE — 2000CC GUARDIAN II: Brand: GUARDIAN

## (undated) DEVICE — ROYAL SILK SURGICAL GOWN, XXL: Brand: CONVERTORS

## (undated) DEVICE — GOWN,SIRUS,NON REINFRCD,LARGE,SET IN SL: Brand: MEDLINE

## (undated) DEVICE — Device

## (undated) DEVICE — GAUZE,SPONGE,2"X2",8PLY,STERILE,LF,2'S: Brand: MEDLINE

## (undated) DEVICE — GLOVE SURG SZ 65 THK91MIL LTX FREE SYN POLYISOPRENE

## (undated) DEVICE — COVER ARMBRD W13XL28.5IN IMPERV BLU FOR OP RM

## (undated) DEVICE — SOLUTION IV 1000ML 0.45% SOD CHL PH 5 INJ USP VIAFLX PLAS

## (undated) DEVICE — GLOVE ORANGE PI 8   MSG9080

## (undated) DEVICE — CONMED SCOPE SAVER BITE BLOCK, 20X27 MM: Brand: SCOPE SAVER

## (undated) DEVICE — BLADE CLIPPER GEN PURP NS

## (undated) DEVICE — FORCEPS BX L240CM JAW DIA3.2MM L CAP W/ NDL MIC MESH TOOTH

## (undated) DEVICE — DRAPE C ARM W36XL30IN RECTANG BND BG AND TAPE

## (undated) DEVICE — SET ADMIN 25ML L117IN PMP MOD CK VLV RLER CLMP 2 SMRTSITE

## (undated) DEVICE — SOLUTION IV IRRIG WATER 1000ML POUR BRL 2F7114